# Patient Record
Sex: MALE | Race: WHITE | NOT HISPANIC OR LATINO | Employment: OTHER | URBAN - METROPOLITAN AREA
[De-identification: names, ages, dates, MRNs, and addresses within clinical notes are randomized per-mention and may not be internally consistent; named-entity substitution may affect disease eponyms.]

---

## 2017-07-25 LAB — HBA1C MFR BLD HPLC: 5.5 %

## 2017-07-31 ENCOUNTER — APPOINTMENT (EMERGENCY)
Dept: RADIOLOGY | Facility: HOSPITAL | Age: 67
End: 2017-07-31
Payer: MEDICARE

## 2017-07-31 ENCOUNTER — HOSPITAL ENCOUNTER (EMERGENCY)
Facility: HOSPITAL | Age: 67
Discharge: ANOTHER HOSPITAL | End: 2017-07-31
Attending: EMERGENCY MEDICINE | Admitting: EMERGENCY MEDICINE
Payer: MEDICARE

## 2017-07-31 VITALS
OXYGEN SATURATION: 95 % | TEMPERATURE: 98.1 F | BODY MASS INDEX: 28.44 KG/M2 | WEIGHT: 192 LBS | RESPIRATION RATE: 18 BRPM | SYSTOLIC BLOOD PRESSURE: 156 MMHG | HEIGHT: 69 IN | HEART RATE: 83 BPM | DIASTOLIC BLOOD PRESSURE: 97 MMHG

## 2017-07-31 DIAGNOSIS — S68.115A AMPUTATION OF LEFT RING FINGER: Primary | ICD-10-CM

## 2017-07-31 DIAGNOSIS — S61.219A LACERATION OF MULTIPLE SITES OF LEFT HAND AND FINGERS, INITIAL ENCOUNTER: ICD-10-CM

## 2017-07-31 DIAGNOSIS — S61.412A LACERATION OF MULTIPLE SITES OF LEFT HAND AND FINGERS, INITIAL ENCOUNTER: ICD-10-CM

## 2017-07-31 PROCEDURE — 73130 X-RAY EXAM OF HAND: CPT

## 2017-07-31 PROCEDURE — 96365 THER/PROPH/DIAG IV INF INIT: CPT

## 2017-07-31 PROCEDURE — 99284 EMERGENCY DEPT VISIT MOD MDM: CPT

## 2017-07-31 PROCEDURE — 96367 TX/PROPH/DG ADDL SEQ IV INF: CPT

## 2017-07-31 PROCEDURE — 90471 IMMUNIZATION ADMIN: CPT

## 2017-07-31 RX ORDER — ACETAMINOPHEN 325 MG/1
975 TABLET ORAL ONCE
Status: COMPLETED | OUTPATIENT
Start: 2017-07-31 | End: 2017-07-31

## 2017-07-31 RX ORDER — CIPROFLOXACIN 2 MG/ML
400 INJECTION, SOLUTION INTRAVENOUS ONCE
Status: DISCONTINUED | OUTPATIENT
Start: 2017-07-31 | End: 2017-07-31 | Stop reason: HOSPADM

## 2017-07-31 RX ADMIN — GENTAMICIN SULFATE 350 MG: 40 INJECTION, SOLUTION INTRAMUSCULAR; INTRAVENOUS at 16:26

## 2017-07-31 RX ADMIN — TETANUS TOXOID, REDUCED DIPHTHERIA TOXOID AND ACELLULAR PERTUSSIS VACCINE, ADSORBED 0.5 ML: 5; 2.5; 8; 8; 2.5 SUSPENSION INTRAMUSCULAR at 16:45

## 2017-07-31 RX ADMIN — CEFAZOLIN SODIUM 2000 MG: 2 SOLUTION INTRAVENOUS at 16:17

## 2017-07-31 RX ADMIN — ACETAMINOPHEN 975 MG: 325 TABLET ORAL at 15:51

## 2018-10-04 ENCOUNTER — OFFICE VISIT (OUTPATIENT)
Dept: FAMILY MEDICINE CLINIC | Facility: CLINIC | Age: 68
End: 2018-10-04
Payer: MEDICARE

## 2018-10-04 ENCOUNTER — APPOINTMENT (OUTPATIENT)
Dept: LAB | Facility: CLINIC | Age: 68
End: 2018-10-04
Payer: MEDICARE

## 2018-10-04 VITALS
WEIGHT: 187 LBS | RESPIRATION RATE: 18 BRPM | DIASTOLIC BLOOD PRESSURE: 88 MMHG | BODY MASS INDEX: 27.7 KG/M2 | HEIGHT: 69 IN | OXYGEN SATURATION: 97 % | HEART RATE: 59 BPM | SYSTOLIC BLOOD PRESSURE: 138 MMHG

## 2018-10-04 DIAGNOSIS — Z12.5 PROSTATE CANCER SCREENING: ICD-10-CM

## 2018-10-04 DIAGNOSIS — E78.2 HYPERLIPIDEMIA, MIXED: ICD-10-CM

## 2018-10-04 DIAGNOSIS — Z76.89 ENCOUNTER TO ESTABLISH CARE: Primary | ICD-10-CM

## 2018-10-04 DIAGNOSIS — R97.20 ELEVATED PSA: Primary | ICD-10-CM

## 2018-10-04 DIAGNOSIS — F42.8 OTHER OBSESSIVE-COMPULSIVE DISORDERS: ICD-10-CM

## 2018-10-04 DIAGNOSIS — I10 ESSENTIAL HYPERTENSION: ICD-10-CM

## 2018-10-04 DIAGNOSIS — Z76.89 ENCOUNTER TO ESTABLISH CARE: ICD-10-CM

## 2018-10-04 LAB
ALBUMIN SERPL BCP-MCNC: 3.7 G/DL (ref 3.5–5)
ALP SERPL-CCNC: 74 U/L (ref 46–116)
ALT SERPL W P-5'-P-CCNC: 24 U/L (ref 12–78)
ANION GAP SERPL CALCULATED.3IONS-SCNC: 5 MMOL/L (ref 4–13)
AST SERPL W P-5'-P-CCNC: 15 U/L (ref 5–45)
BILIRUB SERPL-MCNC: 0.52 MG/DL (ref 0.2–1)
BUN SERPL-MCNC: 15 MG/DL (ref 5–25)
CALCIUM SERPL-MCNC: 8.9 MG/DL (ref 8.3–10.1)
CHLORIDE SERPL-SCNC: 104 MMOL/L (ref 100–108)
CHOLEST SERPL-MCNC: 155 MG/DL (ref 50–200)
CO2 SERPL-SCNC: 28 MMOL/L (ref 21–32)
CREAT SERPL-MCNC: 1.19 MG/DL (ref 0.6–1.3)
GFR SERPL CREATININE-BSD FRML MDRD: 63 ML/MIN/1.73SQ M
GLUCOSE P FAST SERPL-MCNC: 81 MG/DL (ref 65–99)
HDLC SERPL-MCNC: 37 MG/DL (ref 40–60)
LDLC SERPL CALC-MCNC: 85 MG/DL (ref 0–100)
NONHDLC SERPL-MCNC: 118 MG/DL
POTASSIUM SERPL-SCNC: 4.1 MMOL/L (ref 3.5–5.3)
PROT SERPL-MCNC: 7.4 G/DL (ref 6.4–8.2)
PSA SERPL-MCNC: 4.8 NG/ML (ref 0–4)
SODIUM SERPL-SCNC: 137 MMOL/L (ref 136–145)
TRIGL SERPL-MCNC: 167 MG/DL

## 2018-10-04 PROCEDURE — G0103 PSA SCREENING: HCPCS

## 2018-10-04 PROCEDURE — 99204 OFFICE O/P NEW MOD 45 MIN: CPT | Performed by: NURSE PRACTITIONER

## 2018-10-04 PROCEDURE — 80053 COMPREHEN METABOLIC PANEL: CPT

## 2018-10-04 PROCEDURE — 36415 COLL VENOUS BLD VENIPUNCTURE: CPT

## 2018-10-04 PROCEDURE — 80061 LIPID PANEL: CPT

## 2018-10-04 RX ORDER — FLUOXETINE HYDROCHLORIDE 20 MG/1
20 CAPSULE ORAL 2 TIMES DAILY
Qty: 180 CAPSULE | Refills: 0 | OUTPATIENT
Start: 2018-10-04 | End: 2019-01-02

## 2018-10-04 RX ORDER — ATORVASTATIN CALCIUM 10 MG/1
10 TABLET, FILM COATED ORAL DAILY
Qty: 90 TABLET | Refills: 3 | Status: SHIPPED | OUTPATIENT
Start: 2018-10-04 | End: 2019-09-18

## 2018-10-04 RX ORDER — ATORVASTATIN CALCIUM 10 MG/1
10 TABLET, FILM COATED ORAL
COMMUNITY
Start: 2018-02-26 | End: 2018-10-04 | Stop reason: SDUPTHER

## 2018-10-04 RX ORDER — FLUOXETINE HYDROCHLORIDE 20 MG/1
20 CAPSULE ORAL
COMMUNITY
Start: 2018-08-07 | End: 2018-10-04 | Stop reason: SDUPTHER

## 2018-10-04 RX ORDER — FLUOXETINE HYDROCHLORIDE 20 MG/1
20 CAPSULE ORAL DAILY
Qty: 90 CAPSULE | Refills: 3 | Status: SHIPPED | OUTPATIENT
Start: 2018-10-04 | End: 2018-10-04 | Stop reason: SDUPTHER

## 2018-10-04 RX ORDER — LISINOPRIL 10 MG/1
10 TABLET ORAL DAILY
Qty: 90 TABLET | Refills: 3 | Status: SHIPPED | OUTPATIENT
Start: 2018-10-04 | End: 2019-04-11 | Stop reason: SDUPTHER

## 2018-10-04 RX ORDER — LISINOPRIL 10 MG/1
10 TABLET ORAL
COMMUNITY
Start: 2018-02-26 | End: 2018-10-04 | Stop reason: SDUPTHER

## 2018-10-04 RX ORDER — FLUOXETINE HYDROCHLORIDE 20 MG/1
20 CAPSULE ORAL 2 TIMES DAILY
Qty: 180 CAPSULE | Refills: 3 | Status: SHIPPED | OUTPATIENT
Start: 2018-10-04 | End: 2019-09-04 | Stop reason: SDUPTHER

## 2018-10-04 NOTE — PATIENT INSTRUCTIONS
Hypertension- stable  Hyperlipidemia- strive for five servings of fruits and veggies daily  Continue daily exercise  OCD- refill medication  Declines Flu shot  Declines colon cancer screening  Obtain lab work  Our office will call you with results  Follow up in 6 months

## 2018-10-04 NOTE — PROGRESS NOTES
Assessment/Plan:    No problem-specific Assessment & Plan notes found for this encounter  Diagnoses and all orders for this visit:    Encounter to establish care  -     Comprehensive metabolic panel; Future  -     Lipid panel; Future  -     PSA, Total Screen; Future    Essential hypertension  -     Comprehensive metabolic panel; Future  -     Lipid panel; Future  -     PSA, Total Screen; Future  -     lisinopril (ZESTRIL) 10 mg tablet; Take 1 tablet (10 mg total) by mouth daily for 90 days    Hyperlipidemia, mixed  -     Comprehensive metabolic panel; Future  -     Lipid panel; Future  -     PSA, Total Screen; Future  -     atorvastatin (LIPITOR) 10 mg tablet; Take 1 tablet (10 mg total) by mouth daily for 90 days    Prostate cancer screening  -     Comprehensive metabolic panel; Future  -     Lipid panel; Future  -     PSA, Total Screen; Future    Other obsessive-compulsive disorders  -     FLUoxetine (PROzac) 20 mg capsule; Take 1 capsule (20 mg total) by mouth daily for 90 days          Subjective:      Patient ID: Alfredo Gitelman is a 78 y o  male  Pt is here to become Established  His last visit was 6 months ago to his PCP  Hypertension-stable  HLD- needs labs  OCD- stable with Prozac  Leaving on a trip to [place] , next week  Pt did have an accident in 2017 with a table saw and suffered loss of 4th finger and still has some pain and "tightness"   Declines colon cancer screening        The following portions of the patient's history were reviewed and updated as appropriate:   He  has a past medical history of Hyperlipidemia; Hypertension; and OCD (obsessive compulsive disorder)  He   Patient Active Problem List    Diagnosis Date Noted    Prostate cancer screening 10/04/2018    Essential hypertension 10/04/2018    Hyperlipidemia, mixed 10/04/2018     He  has a past surgical history that includes Knee surgery  His family history is not on file  He  reports that he has never smoked   He has never used smokeless tobacco  He reports that he does not drink alcohol or use drugs  Current Outpatient Prescriptions   Medication Sig Dispense Refill    atorvastatin (LIPITOR) 10 mg tablet Take 1 tablet (10 mg total) by mouth daily for 90 days 90 tablet 3    FLUoxetine (PROzac) 20 mg capsule Take 1 capsule (20 mg total) by mouth daily for 90 days 90 capsule 3    lisinopril (ZESTRIL) 10 mg tablet Take 1 tablet (10 mg total) by mouth daily for 90 days 90 tablet 3     No current facility-administered medications for this visit  No current outpatient prescriptions on file prior to visit  No current facility-administered medications on file prior to visit  He has No Known Allergies       Review of Systems   Constitutional: Negative for fatigue and fever  HENT: Negative for congestion  Eyes: Negative for visual disturbance  Respiratory: Negative for cough and shortness of breath  Cardiovascular: Negative for chest pain, palpitations and leg swelling  Gastrointestinal: Negative for abdominal distention and abdominal pain  Endocrine: Negative for cold intolerance, polydipsia and polyuria  Genitourinary: Negative for difficulty urinating  Musculoskeletal: Negative for back pain and joint swelling  Skin: Negative for color change and rash  Allergic/Immunologic: Negative for immunocompromised state  Neurological: Negative for dizziness and headaches  Hematological: Negative for adenopathy  Psychiatric/Behavioral: Negative for behavioral problems and sleep disturbance         + OCD   All other systems reviewed and are negative  Objective:      /88 (BP Location: Left arm, Patient Position: Sitting)   Pulse 59   Resp 18   Ht 5' 9" (1 753 m)   Wt 84 8 kg (187 lb)   SpO2 97%   BMI 27 62 kg/m²          Physical Exam   Constitutional: He is oriented to person, place, and time  He appears well-developed and well-nourished  No distress  HENT:   Head: Normocephalic and atraumatic  Mouth/Throat: Oropharynx is clear and moist    Eyes: Pupils are equal, round, and reactive to light  Conjunctivae are normal    Neck: Normal range of motion  Neck supple  No JVD present  No thyromegaly present  Cardiovascular: Normal rate, regular rhythm and normal heart sounds  Exam reveals no gallop and no friction rub  No murmur heard  Pulmonary/Chest: Effort normal and breath sounds normal  No respiratory distress  Abdominal: Soft  Bowel sounds are normal  He exhibits no distension  There is no tenderness  Musculoskeletal: Normal range of motion  He exhibits deformity  Missing 4th finger of left hand   Lymphadenopathy:     He has no cervical adenopathy  Neurological: He is alert and oriented to person, place, and time  Skin: Skin is warm and dry  He is not diaphoretic  Psychiatric: He has a normal mood and affect  His behavior is normal  Judgment and thought content normal    Nursing note and vitals reviewed

## 2018-10-22 ENCOUNTER — TELEPHONE (OUTPATIENT)
Dept: UROLOGY | Facility: AMBULATORY SURGERY CENTER | Age: 68
End: 2018-10-22

## 2018-10-22 NOTE — TELEPHONE ENCOUNTER
Reason for appointment/Complaint/Diagnosis :  PSA 4 8    Insurance: Medicare & [employer]     History of Cancer?    no                    If yes, what kind    Previous urologist?     no              Records requested/where? In [address] [place] [address] Rd     Outside testing/where? In [facility]     Location Preference for office visit? [facility] only please, patient will be away first week of November

## 2019-01-14 NOTE — PATIENT INSTRUCTIONS
Prostate Specific Antigen   WHAT YOU NEED TO KNOW:   What is a prostate specific antigen (PSA) test?  A PSA test is a blood test used to screen men for prostate cancer  A PSA test is also used to monitor how well prostate cancer treatment is working  What other test may be done with a PSA test?  A digital rectal exam is usually performed with a PSA test  Your healthcare provider will insert a gloved finger into your rectum to feel if your prostate is large, firm, or has lumps  Who needs a PSA test?  Some experts recommend a PSA test for men ages 48 to 79  They also recommend testing men with a high risk for prostate cancer at age 36 or 39  Risk factors may include being  or having a brother or father with prostate cancer  Other experts may not recommend PSA testing  Your healthcare provider can help you decide if you need a PSA test    What do the results of a PSA test mean? Most healthy men have a PSA level less than 4 ng/mL  If your PSA level is higher than 4 ng/mL you may need more tests  Examples include blood or urine tests, an ultrasound, MRI, CT scan, or a prostate biopsy  Ask your healthcare provider for more information on these tests  What else can cause a high PSA level? A high PSA level does not always mean you have prostate cancer  Certain conditions or procedures can increase PSA levels  Examples include:  · Older age    · Procedures such as a prostate biopsy or a cystoscopy    · An enlarged prostate    · Recent sexual activity    · A prostate infection    · Certain exercises that put pressure on the prostate such as bicycling    · Medicine such as testosterone  CARE AGREEMENT:   You have the right to help plan your care  Learn about your health condition and how it may be treated  Discuss treatment options with your caregivers to decide what care you want to receive  You always have the right to refuse treatment  The above information is an  only   It is not intended as medical advice for individual conditions or treatments  Talk to your doctor, nurse or pharmacist before following any medical regimen to see if it is safe and effective for you  © 2017 [employer] Information is for End User's use only and may not be sold, redistributed or otherwise used for commercial purposes  All illustrations and images included in CareNotes® are the copyrighted property of [employer]  or [employer]

## 2019-01-14 NOTE — PROGRESS NOTES
Problem List Items Addressed This Visit     Prostate cancer screening - Primary     I discussed with the patient the AUA guidelines recommendations for prostate cancer screening in men age 53-78  We also discussed the competing risks of age, medical comorbidities, and the risks of infection, bleeding, pain, damage to surrounding structures, risk of diagnosis prostate cancer, and risks of incontinence and erectile dysfunction upon diagnosis and treatment of prostate cancer  Plan:  The patient's prostate is 45 grams to 50 grams and smooth without nodules, PSA is 4 8, slightly elevated, no family history of prostate cancer  Will repeat PSA free and total, follow-up in 4 weeks         Elevated PSA     I discussed with the patient the discovery of the PSA molecule and its original use in determining the return of prostate cancer after definitive therapy  I described the normal function of the PSA molecule in the reproductive process and also discussed the detection of PSA in the blood  We discussed the controversial history of PSA screening for prostate cancer in the [place] as well as the risk of over detection and over treatment of prostate cancer by way of PSA screening  The patient understands that PSA blood testing is an imperfect way to screen for prostate cancer and that elevated PSA levels in the blood may also be caused by infection, inflammation, prostatic trauma or manipulation, urological procedures, or by benign prostatic enlargement  The role of the digital rectal examination in prostate cancer screening was also discussed and I discussed with him that there is large interobserver variability in the findings of digital rectal examination  We discussed the continued workup of elevated PSA or abnormal digital rectal examination in the form of the performance of a prostate biopsy    The preparation for, and steps of, an office-based transrectal ultrasound of prostate biopsy were described to the patient  Benefits of obtaining tissue for pathologic analysis were discussed with him and the risks of prostate biopsy were also discussed at length  These risks include but are not limited to infection, bleeding, pain, sepsis with need for admission to hospital, risk of change in sexual function, and risk of diagnosis with prostate cancer  Alternatives to prostate biopsy in the form of continued PSA and BEKA surveillance were also offered to him  All of his questions and concerns were answered and addressed with regard to that detailed above  Plan: RTC 4 weeks with PSA free and total prior, if remains elevated will proceed to biopsy versus PSA surveillance           Relevant Orders    PSA, total and free              Assessment and plan:       Please see problem oriented charting for the assessment plan of today's urological complaints    Cathy Sykes MD      Chief Complaint     Chief Complaint   Patient presents with    Elevated PSA         History of Present Illness     Gloria Fischer is a 80 y o  retired  referred in consultation by Kate La, Alpesh Michelle for elevated PSA  He does not know what his baseline PSA value was, his PSA as of October 2018 is 4 8  His family history is negative for prostate cancer  He does have a history of nephrolithiasis in 2011, no history or signs of recurrent stones since that time  AUA symptom score is 5 with a bother score of 1  Of note, around the time his PSA was checked he was having urinary frequency and worsened nocturia, these have since resolved  This raises the possibility of a spurious elevation of PSA  He has no other urologic complaints today  He is well educated has good health literacy and has done some reading on elevated PSA and is also talked to some of his friends regarding this chief complaint      On review of systems he denies dysuria, incontinence, hematuria, only some warning time hesitancy is present, some urgency is present, nocturia is present once per night, and stream quality is good  The following portions of the patient's history were reviewed and updated as appropriate: allergies, current medications, past family history, past medical history, past social history, past surgical history and problem list     Detailed Urologic History     - please refer to HPI    Review of Systems     Review of Systems   Constitutional: Negative  HENT: Negative  Eyes: Negative  Respiratory: Negative  Cardiovascular: Negative  Gastrointestinal: Negative  Endocrine: Negative  Genitourinary:        As per HPI   Musculoskeletal: Negative  Skin: Negative  Allergic/Immunologic: Negative  Neurological: Negative  Hematological: Negative  Psychiatric/Behavioral: Negative  Allergies     No Known Allergies    Physical Exam     Physical Exam   Constitutional: He is oriented to person, place, and time  He appears well-developed and well-nourished  No distress  HENT:   Head: Normocephalic and atraumatic  Right Ear: External ear normal    Left Ear: External ear normal    Nose: Nose normal    Eyes: Pupils are equal, round, and reactive to light  Conjunctivae and EOM are normal  Right eye exhibits no discharge  Left eye exhibits no discharge  No scleral icterus  Neck: Normal range of motion  Neck supple  Cardiovascular: Regular rhythm and intact distal pulses  Pulmonary/Chest: Effort normal  No stridor  No respiratory distress  He has no wheezes  Abdominal: Soft  He exhibits no distension and no mass  There is no tenderness  There is no rebound and no guarding  No hernia  Genitourinary:   Genitourinary Comments: Penis is normal, circumcised, testes are normal, there is a small epididymal cyst on the left, appears benign on exam, perineum is normal, prostate is 45-50 grams and smooth without nodules   Musculoskeletal: He exhibits no edema, tenderness or deformity  Neurological: He is alert and oriented to person, place, and time  No cranial nerve deficit  He exhibits normal muscle tone  Coordination normal    Skin: Skin is warm and dry  No rash noted  He is not diaphoretic  No erythema  No pallor  Psychiatric: He has a normal mood and affect  His behavior is normal  Judgment and thought content normal    Nursing note and vitals reviewed  Vital Signs  Vitals:    01/16/19 1404   BP: 132/84   BP Location: Left arm   Patient Position: Sitting   Cuff Size: Standard   Pulse: 71   Weight: 85 5 kg (188 lb 9 6 oz)   Height: 5' 9" (1 753 m)         Current Medications       Current Outpatient Prescriptions:     atorvastatin (LIPITOR) 10 mg tablet, Take 1 tablet (10 mg total) by mouth daily for 90 days, Disp: 90 tablet, Rfl: 3    FLUoxetine (PROzac) 20 mg capsule, Take 1 capsule (20 mg total) by mouth 2 (two) times a day for 90 days, Disp: 180 capsule, Rfl: 3    lisinopril (ZESTRIL) 10 mg tablet, Take 1 tablet (10 mg total) by mouth daily for 90 days, Disp: 90 tablet, Rfl: 3      Active Problems     Patient Active Problem List   Diagnosis    Prostate cancer screening    Essential hypertension    Hyperlipidemia, mixed    Elevated PSA         Past Medical History     Past Medical History:   Diagnosis Date    Hyperlipidemia     Hypertension     OCD (obsessive compulsive disorder)          Surgical History     Past Surgical History:   Procedure Laterality Date    KNEE SURGERY           Family History     History reviewed  No pertinent family history        Social History     Social History     History   Smoking Status    Never Smoker   Smokeless Tobacco    Never Used         Pertinent Lab Values     Lab Results   Component Value Date    CREATININE 1 19 10/04/2018       Lab Results   Component Value Date    PSA 4 8 (H) 10/04/2018             Pertinent Imaging      No imaging available for my review

## 2019-01-16 ENCOUNTER — OFFICE VISIT (OUTPATIENT)
Dept: UROLOGY | Facility: CLINIC | Age: 69
End: 2019-01-16
Payer: COMMERCIAL

## 2019-01-16 VITALS
SYSTOLIC BLOOD PRESSURE: 132 MMHG | DIASTOLIC BLOOD PRESSURE: 84 MMHG | WEIGHT: 188.6 LBS | HEART RATE: 71 BPM | BODY MASS INDEX: 27.93 KG/M2 | HEIGHT: 69 IN

## 2019-01-16 DIAGNOSIS — R97.20 ELEVATED PSA: ICD-10-CM

## 2019-01-16 DIAGNOSIS — Z12.5 PROSTATE CANCER SCREENING: Primary | ICD-10-CM

## 2019-01-16 PROCEDURE — 99204 OFFICE O/P NEW MOD 45 MIN: CPT | Performed by: UROLOGY

## 2019-01-16 NOTE — ASSESSMENT & PLAN NOTE
I discussed with the patient the discovery of the PSA molecule and its original use in determining the return of prostate cancer after definitive therapy  I described the normal function of the PSA molecule in the reproductive process and also discussed the detection of PSA in the blood  We discussed the controversial history of PSA screening for prostate cancer in the [place] as well as the risk of over detection and over treatment of prostate cancer by way of PSA screening  The patient understands that PSA blood testing is an imperfect way to screen for prostate cancer and that elevated PSA levels in the blood may also be caused by infection, inflammation, prostatic trauma or manipulation, urological procedures, or by benign prostatic enlargement  The role of the digital rectal examination in prostate cancer screening was also discussed and I discussed with him that there is large interobserver variability in the findings of digital rectal examination  We discussed the continued workup of elevated PSA or abnormal digital rectal examination in the form of the performance of a prostate biopsy  The preparation for, and steps of, an office-based transrectal ultrasound of prostate biopsy were described to the patient  Benefits of obtaining tissue for pathologic analysis were discussed with him and the risks of prostate biopsy were also discussed at length  These risks include but are not limited to infection, bleeding, pain, sepsis with need for admission to hospital, risk of change in sexual function, and risk of diagnosis with prostate cancer  Alternatives to prostate biopsy in the form of continued PSA and BEKA surveillance were also offered to him  All of his questions and concerns were answered and addressed with regard to that detailed above      Plan: RTC 4 weeks with PSA free and total prior, if remains elevated will proceed to biopsy versus PSA surveillance

## 2019-01-16 NOTE — LETTER
January 16, 2019     Angelique England, [address]  [facility]  [address]    Patient: Jyotsna Rehman   YOB: 1950   Date of Visit: 1/16/2019       Dear Dr Ra Mujica:    Thank you for referring Tino Sanches to me for evaluation  Below are my notes for this consultation  If you have questions, please do not hesitate to call me  I look forward to following your patient along with you  Sincerely,        Suzanne Chaudhari MD        CC: No Recipients  Suzanne Chaudhari MD  1/16/2019  2:27 PM  Sign at close encounter       Problem List Items Addressed This Visit     Prostate cancer screening - Primary     I discussed with the patient the AUA guidelines recommendations for prostate cancer screening in men age 53-78  We also discussed the competing risks of age, medical comorbidities, and the risks of infection, bleeding, pain, damage to surrounding structures, risk of diagnosis prostate cancer, and risks of incontinence and erectile dysfunction upon diagnosis and treatment of prostate cancer  Plan:  The patient's prostate is 45 grams to 50 grams and smooth without nodules, PSA is 4 8, slightly elevated, no family history of prostate cancer  Will repeat PSA free and total, follow-up in 4 weeks         Elevated PSA     I discussed with the patient the discovery of the PSA molecule and its original use in determining the return of prostate cancer after definitive therapy  I described the normal function of the PSA molecule in the reproductive process and also discussed the detection of PSA in the blood  We discussed the controversial history of PSA screening for prostate cancer in the [place] as well as the risk of over detection and over treatment of prostate cancer by way of PSA screening    The patient understands that PSA blood testing is an imperfect way to screen for prostate cancer and that elevated PSA levels in the blood may also be caused by infection, inflammation, prostatic trauma or manipulation, urological procedures, or by benign prostatic enlargement  The role of the digital rectal examination in prostate cancer screening was also discussed and I discussed with him that there is large interobserver variability in the findings of digital rectal examination  We discussed the continued workup of elevated PSA or abnormal digital rectal examination in the form of the performance of a prostate biopsy  The preparation for, and steps of, an office-based transrectal ultrasound of prostate biopsy were described to the patient  Benefits of obtaining tissue for pathologic analysis were discussed with him and the risks of prostate biopsy were also discussed at length  These risks include but are not limited to infection, bleeding, pain, sepsis with need for admission to hospital, risk of change in sexual function, and risk of diagnosis with prostate cancer  Alternatives to prostate biopsy in the form of continued PSA and BEKA surveillance were also offered to him  All of his questions and concerns were answered and addressed with regard to that detailed above  Plan: RTC 4 weeks with PSA free and total prior, if remains elevated will proceed to biopsy versus PSA surveillance           Relevant Orders    PSA, total and free              Assessment and plan:       Please see problem oriented charting for the assessment plan of today's urological complaints    Brayan Gallardo MD      Chief Complaint     Chief Complaint   Patient presents with    Elevated PSA         History of Present Illness     Zach Barraza is a 80 y o  retired  referred in consultation by Alpesh Ballard for elevated PSA  He does not know what his baseline PSA value was, his PSA as of October 2018 is 4 8  His family history is negative for prostate cancer  He does have a history of nephrolithiasis in 2011, no history or signs of recurrent stones since that time    DIANA symptom score is 5 with a bother score of 1  Of note, around the time his PSA was checked he was having urinary frequency and worsened nocturia, these have since resolved  This raises the possibility of a spurious elevation of PSA  He has no other urologic complaints today  He is well educated has good health literacy and has done some reading on elevated PSA and is also talked to some of his friends regarding this chief complaint  On review of systems he denies dysuria, incontinence, hematuria, only some warning time hesitancy is present, some urgency is present, nocturia is present once per night, and stream quality is good  The following portions of the patient's history were reviewed and updated as appropriate: allergies, current medications, past family history, past medical history, past social history, past surgical history and problem list     Detailed Urologic History     - please refer to HPI    Review of Systems     Review of Systems   Constitutional: Negative  HENT: Negative  Eyes: Negative  Respiratory: Negative  Cardiovascular: Negative  Gastrointestinal: Negative  Endocrine: Negative  Genitourinary:        As per HPI   Musculoskeletal: Negative  Skin: Negative  Allergic/Immunologic: Negative  Neurological: Negative  Hematological: Negative  Psychiatric/Behavioral: Negative  Allergies     No Known Allergies    Physical Exam     Physical Exam   Constitutional: He is oriented to person, place, and time  He appears well-developed and well-nourished  No distress  HENT:   Head: Normocephalic and atraumatic  Right Ear: External ear normal    Left Ear: External ear normal    Nose: Nose normal    Eyes: Pupils are equal, round, and reactive to light  Conjunctivae and EOM are normal  Right eye exhibits no discharge  Left eye exhibits no discharge  No scleral icterus  Neck: Normal range of motion  Neck supple     Cardiovascular: Regular rhythm and intact distal pulses  Pulmonary/Chest: Effort normal  No stridor  No respiratory distress  He has no wheezes  Abdominal: Soft  He exhibits no distension and no mass  There is no tenderness  There is no rebound and no guarding  No hernia  Genitourinary:   Genitourinary Comments: Penis is normal, circumcised, testes are normal, there is a small epididymal cyst on the left, appears benign on exam, perineum is normal, prostate is 45-50 grams and smooth without nodules   Musculoskeletal: He exhibits no edema, tenderness or deformity  Neurological: He is alert and oriented to person, place, and time  No cranial nerve deficit  He exhibits normal muscle tone  Coordination normal    Skin: Skin is warm and dry  No rash noted  He is not diaphoretic  No erythema  No pallor  Psychiatric: He has a normal mood and affect  His behavior is normal  Judgment and thought content normal    Nursing note and vitals reviewed            Vital Signs  Vitals:    01/16/19 1404   BP: 132/84   BP Location: Left arm   Patient Position: Sitting   Cuff Size: Standard   Pulse: 71   Weight: 85 5 kg (188 lb 9 6 oz)   Height: 5' 9" (1 753 m)         Current Medications       Current Outpatient Prescriptions:     atorvastatin (LIPITOR) 10 mg tablet, Take 1 tablet (10 mg total) by mouth daily for 90 days, Disp: 90 tablet, Rfl: 3    FLUoxetine (PROzac) 20 mg capsule, Take 1 capsule (20 mg total) by mouth 2 (two) times a day for 90 days, Disp: 180 capsule, Rfl: 3    lisinopril (ZESTRIL) 10 mg tablet, Take 1 tablet (10 mg total) by mouth daily for 90 days, Disp: 90 tablet, Rfl: 3      Active Problems     Patient Active Problem List   Diagnosis    Prostate cancer screening    Essential hypertension    Hyperlipidemia, mixed    Elevated PSA         Past Medical History     Past Medical History:   Diagnosis Date    Hyperlipidemia     Hypertension     OCD (obsessive compulsive disorder)          Surgical History     Past Surgical History: Procedure Laterality Date    KNEE SURGERY           Family History     History reviewed  No pertinent family history        Social History     Social History     History   Smoking Status    Never Smoker   Smokeless Tobacco    Never Used         Pertinent Lab Values     Lab Results   Component Value Date    CREATININE 1 19 10/04/2018       Lab Results   Component Value Date    PSA 4 8 (H) 10/04/2018             Pertinent Imaging      No imaging available for my review

## 2019-01-16 NOTE — ASSESSMENT & PLAN NOTE
I discussed with the patient the AUA guidelines recommendations for prostate cancer screening in men age 53-78  We also discussed the competing risks of age, medical comorbidities, and the risks of infection, bleeding, pain, damage to surrounding structures, risk of diagnosis prostate cancer, and risks of incontinence and erectile dysfunction upon diagnosis and treatment of prostate cancer  Plan:  The patient's prostate is 45 grams to 50 grams and smooth without nodules, PSA is 4 8, slightly elevated, no family history of prostate cancer   Will repeat PSA free and total, follow-up in 4 weeks

## 2019-01-20 ENCOUNTER — TELEPHONE (OUTPATIENT)
Dept: UROLOGY | Facility: CLINIC | Age: 69
End: 2019-01-20

## 2019-01-20 DIAGNOSIS — R97.20 ELEVATED PSA: Primary | ICD-10-CM

## 2019-01-20 RX ORDER — CIPROFLOXACIN 500 MG/1
500 TABLET, FILM COATED ORAL EVERY 12 HOURS SCHEDULED
Qty: 6 TABLET | Refills: 0 | Status: SHIPPED | OUTPATIENT
Start: 2019-01-20 | End: 2019-01-23

## 2019-01-20 RX ORDER — MAGNESIUM CARB/ALUMINUM HYDROX 105-160MG
296 TABLET,CHEWABLE ORAL ONCE
Qty: 1 BOTTLE | Refills: 0 | Status: SHIPPED | OUTPATIENT
Start: 2019-01-20 | End: 2019-02-07

## 2019-01-20 NOTE — TELEPHONE ENCOUNTER
PSA free and total reviewed, PSA has increased, low percentage of free PSA, higher risk for prostate cancer    He will require prostate biopsy

## 2019-01-21 NOTE — TELEPHONE ENCOUNTER
Called and spoke to patient  Reviewed with patient that per Dr Paul PSA level has increased and that he is recommending a prostate biopsy  Reviewed with patient prostate biopsy prep including, discontinuing blood thinners, antibiotic, mag citrate, and enema  Also reviewed with patient prostate biopsy itself  Also made patient aware that he we will him a copy of the prostate biopsy preparation sheet  Confirmed patients address  Also confirmed patients pharmacy and made him aware that medications were already sent over  Patient aware that him appointment will need to be rescheduled into a prostate biopsy appointment and that he will also need a 2 week discussion appointment  Please call patient and schedule prostate biopsy and 2 week discussion appointment after biopsy

## 2019-01-21 NOTE — TELEPHONE ENCOUNTER
Spoke with patient and scheduled BX for this Friday 1/25/19 at 10am  Scheduled biopsy results for 2/7/19 at 11am

## 2019-01-22 ENCOUNTER — TELEPHONE (OUTPATIENT)
Dept: UROLOGY | Facility: CLINIC | Age: 69
End: 2019-01-22

## 2019-01-22 LAB
PSA FREE MFR SERPL: 5.9 %
PSA FREE SERPL-MCNC: 0.4 NG/ML
PSA SERPL-MCNC: 6.8 NG/ML (ref 0–4)

## 2019-01-23 NOTE — PROGRESS NOTES
Office TRUS-guided Prostate Biopsy Procedure Note    Indication    Elevated PSA, increasing PSA    Informed consent   The risks, benefits and alternatives to TRUS-guided prostate biopsy were conveyed to the patient prior to performing the procedure  A discussion of the risks of the procedure included, but was not limited to: pain, hematuria, hematochezia, hematospermia, infection, and the possibility of a non-diagnostic biopsy  The patient was given the opportunity to have his questions answered and there was no perceived barrier to education  Antibiotic prophylaxis   The patient received the following antibiotics at least 30 minutes prior to undergoing biopsy: Ciprofloxacin 500 mg PO  The patient was instructed to continue taking the antibiotics as prescribed for a total of 3 days, including the day of biopsy  Rectal cleansing  The patient was instructed to perform an evacuating rectal enema 1-2 hours prior to biopsy  Local anesthesia  Topical 2% lidocaine jelly was applied liberally to the anus and rectum and allowed to dwell for at least 5 min prior to starting the procedure  After insertion of the TRUS probe, 10 mL of 2% lidocaine solution was injected with ultrasound guidance at the  junction of the prostate and seminal vesicles  The anesthetic was allowed to dwell for at least 2 minutes prior to biopsy  Transrectal ultrasonography  The patient was placed in the left lateral decubitus position  After an attentive digital rectal examination, a 7 5 mHz sidefire ultrasound probe was gently inserted into the rectum and biplanar imaging of the prostate was done with the findings noted below  Images were taken of any abnormal findings and also to document prostate size      Bladder  The bladder base appeared normal     Prostate  Digital rectal exam findings:  - approximately 30 gram gland, normal consistency    Ultrasound size measurements:  -Volume:  22 14 cm3    Ultrasound findings:  -Cysts: None  -Masses: None  -Median lobe: absent    Clinical stage (assuming a positive biopsy):   -T1c     TRUS-guided needle biopsy  Using an 18 gauge biopsy needle and ultrasound guidance, the following biopsies were taken:    1 core(s) from the left lateral base  1 core(s) from the left lateral mid-gland  1 core(s) from the right middle base  1 core(s) from the right lateral base  1 core(s) from the left lateral mid-gland  1 core(s) from the left middle mid-gland  1 core(s) from the right middle mid-gland  1 core(s) from the right lateral mid-gland  1 core(s) from the left lateral apex  1 core(s) from the left middle apex  1 core(s) from the right middle apex  1 core(s) from the right lateral apex    Total number of cores: 12                Complications  There were no procedural complications  Disposition  The patient was dismissed to home after 30 minutes of observation in stable condition  Post-procedure instructions: Today he underwent an uncomplicated transrectal ultrasound-guided biopsy of the prostate, following a periprosthetic nerve block  I reviewed the normal postprocedure a course including bleeding per recutm, hematuria, and hematospermia  I instructed him to complete his course of antibiotics as prescribed  Instructed him to call with fever greater than 101, chills, nausea, vomiting, poorly controlled pain  His followup was scheduled in approximately 2 weeks' time to review the pathology  Biopsy prostate     Date/Time 1/25/2019 10:23 AM     Performed by  Arthur Rogers     Authorized by Arthur Rogers       Consent: Verbal consent obtained  Written consent obtained    Risks and benefits: risks, benefits and alternatives were discussed  Consent given by: patient  Patient understanding: patient states understanding of the procedure being performed  Patient consent: the patient's understanding of the procedure matches consent given  Procedure consent: procedure consent matches procedure scheduled  Relevant documents: relevant documents present and verified  Test results: test results available and properly labeled  Site marked: the operative site was not marked  Imaging studies: imaging studies available  Required items: required blood products, implants, devices, and special equipment available  Patient identity confirmed: verbally with patient and provided demographic data      Preparation: Patient was prepped and draped in the usual sterile fashion  Local anesthesia used: yes     Anesthesia   Local anesthesia used: yes  Local Anesthetic: lidocaine 2% without epinephrine     Sedation   Patient sedated: no      Specimen: yes    Culture: no   Procedure Details   Procedure Notes: Uncomplicated 12 core biopsy, taking 6 cores from each half of the prostate    Patient will follow in 2 weeks for results

## 2019-01-23 NOTE — PATIENT INSTRUCTIONS
Prostate Biopsy   WHAT YOU NEED TO KNOW:   A prostate biopsy is a procedure to remove samples of tissue from your prostate gland  The prostate is a male sex gland that makes fluid found in semen  It is located just below the bladder  After the samples are removed, they are sent to a lab and tested for cancer  DISCHARGE INSTRUCTIONS:   Seek care immediately if:   · You have heavy bleeding from your rectum  · You urinate very little or not at all  · You have pain from your procedure that gets worse, even after you take pain medicine  Contact your healthcare provider if:   · You have a fever or chills  · You feel pain or burning when you urinate  · Your urine is cloudy or smells bad  · You have questions or concerns about your condition or care  Medicines:  · Medicines  can help decrease pain  You may need medicine to prevent or treat a bacterial infection  Ask how to take pain medicine safely  · Take your medicine as directed  Contact your healthcare provider if you think your medicine is not helping or if you have side effects  Tell him or her if you are allergic to any medicine  Keep a list of the medicines, vitamins, and herbs you take  Include the amounts, and when and why you take them  Bring the list or the pill bottles to follow-up visits  Carry your medicine list with you in case of an emergency  Follow up with your healthcare provider or urologist as directed: You may need to return for more tests or procedures  Write down your questions so you remember to ask them during your visits  © 2017 [employer] Information is for End User's use only and may not be sold, redistributed or otherwise used for commercial purposes  All illustrations and images included in CareNotes® are the copyrighted property of [employer]  or [employer]  The above information is an  only   It is not intended as medical advice for individual conditions or treatments  Talk to your doctor, nurse or pharmacist before following any medical regimen to see if it is safe and effective for you

## 2019-01-25 ENCOUNTER — PROCEDURE VISIT (OUTPATIENT)
Dept: UROLOGY | Facility: CLINIC | Age: 69
End: 2019-01-25
Payer: COMMERCIAL

## 2019-01-25 VITALS
DIASTOLIC BLOOD PRESSURE: 92 MMHG | SYSTOLIC BLOOD PRESSURE: 118 MMHG | BODY MASS INDEX: 27.76 KG/M2 | HEART RATE: 74 BPM | HEIGHT: 69 IN | WEIGHT: 187.4 LBS

## 2019-01-25 DIAGNOSIS — R97.20 ELEVATED PSA: Primary | ICD-10-CM

## 2019-01-25 PROCEDURE — G0416 PROSTATE BIOPSY, ANY MTHD: HCPCS | Performed by: PATHOLOGY

## 2019-01-25 PROCEDURE — 76942 ECHO GUIDE FOR BIOPSY: CPT | Performed by: UROLOGY

## 2019-01-25 PROCEDURE — 88344 IMHCHEM/IMCYTCHM EA MLT ANTB: CPT | Performed by: PATHOLOGY

## 2019-01-25 PROCEDURE — 55700 PR BIOPSY OF PROSTATE,NEEDLE/PUNCH: CPT | Performed by: UROLOGY

## 2019-01-25 NOTE — LETTER
January 25, 2019     Severa Belts, [facility] [address] [facility]    Patient: Demetrius Franklin   YOB: 1950   Date of Visit: 1/25/2019       Dear Dr Sebastián Brady:    Thank you for referring Master Arvizu to me for evaluation  Below are my notes for this consultation  If you have questions, please do not hesitate to call me  I look forward to following your patient along with you  Sincerely,        Jama Ernandez MD        CC: No Recipients  Jama Ernandez MD  1/25/2019 10:23 AM  Sign at close encounter  Office TRUS-guided Prostate Biopsy Procedure Note    Indication    Elevated PSA, increasing PSA    Informed consent   The risks, benefits and alternatives to TRUS-guided prostate biopsy were conveyed to the patient prior to performing the procedure  A discussion of the risks of the procedure included, but was not limited to: pain, hematuria, hematochezia, hematospermia, infection, and the possibility of a non-diagnostic biopsy  The patient was given the opportunity to have his questions answered and there was no perceived barrier to education  Antibiotic prophylaxis   The patient received the following antibiotics at least 30 minutes prior to undergoing biopsy: Ciprofloxacin 500 mg PO  The patient was instructed to continue taking the antibiotics as prescribed for a total of 3 days, including the day of biopsy  Rectal cleansing  The patient was instructed to perform an evacuating rectal enema 1-2 hours prior to biopsy  Local anesthesia  Topical 2% lidocaine jelly was applied liberally to the anus and rectum and allowed to dwell for at least 5 min prior to starting the procedure  After insertion of the TRUS probe, 10 mL of 2% lidocaine solution was injected with ultrasound guidance at the  junction of the prostate and seminal vesicles  The anesthetic was allowed to dwell for at least 2 minutes prior to biopsy      Transrectal ultrasonography  The patient was placed in the left lateral decubitus position  After an attentive digital rectal examination, a 7 5 mHz sidefire ultrasound probe was gently inserted into the rectum and biplanar imaging of the prostate was done with the findings noted below  Images were taken of any abnormal findings and also to document prostate size  Bladder  The bladder base appeared normal     Prostate  Digital rectal exam findings:  - approximately 30 gram gland, normal consistency    Ultrasound size measurements:  -Volume:  22 14 cm3    Ultrasound findings:  -Cysts: None  -Masses: None  -Median lobe: absent    Clinical stage (assuming a positive biopsy):   -T1c     TRUS-guided needle biopsy  Using an 18 gauge biopsy needle and ultrasound guidance, the following biopsies were taken:    1 core(s) from the left lateral base  1 core(s) from the left lateral mid-gland  1 core(s) from the right middle base  1 core(s) from the right lateral base  1 core(s) from the left lateral mid-gland  1 core(s) from the left middle mid-gland  1 core(s) from the right middle mid-gland  1 core(s) from the right lateral mid-gland  1 core(s) from the left lateral apex  1 core(s) from the left middle apex  1 core(s) from the right middle apex  1 core(s) from the right lateral apex    Total number of cores: 12                Complications  There were no procedural complications  Disposition  The patient was dismissed to home after 30 minutes of observation in stable condition  Post-procedure instructions: Today he underwent an uncomplicated transrectal ultrasound-guided biopsy of the prostate, following a periprosthetic nerve block  I reviewed the normal postprocedure a course including bleeding per recutm, hematuria, and hematospermia  I instructed him to complete his course of antibiotics as prescribed  Instructed him to call with fever greater than 101, chills, nausea, vomiting, poorly controlled pain   His followup was scheduled in approximately 2 weeks' time to review the pathology  Biopsy prostate     Date/Time 1/25/2019 10:23 AM     Performed by  Jon Ghosh     Authorized by Jon Bowels       Consent: Verbal consent obtained  Written consent obtained  Risks and benefits: risks, benefits and alternatives were discussed  Consent given by: patient  Patient understanding: patient states understanding of the procedure being performed  Patient consent: the patient's understanding of the procedure matches consent given  Procedure consent: procedure consent matches procedure scheduled  Relevant documents: relevant documents present and verified  Test results: test results available and properly labeled  Site marked: the operative site was not marked  Imaging studies: imaging studies available  Required items: required blood products, implants, devices, and special equipment available  Patient identity confirmed: verbally with patient and provided demographic data      Preparation: Patient was prepped and draped in the usual sterile fashion  Local anesthesia used: yes     Anesthesia   Local anesthesia used: yes  Local Anesthetic: lidocaine 2% without epinephrine     Sedation   Patient sedated: no      Specimen: yes    Culture: no   Procedure Details   Procedure Notes: Uncomplicated 12 core biopsy, taking 6 cores from each half of the prostate    Patient will follow in 2 weeks for results

## 2019-02-05 NOTE — PATIENT INSTRUCTIONS
Decision Aid for Clinically Localized Prostate Cancer   WHAT YOU NEED TO KNOW:   What do I need to know about decisions for clinically localized prostate cancer? You can work with your healthcare provider to make decisions about being screened or treated for prostate cancer  Screening is a test done to find prostate cancer early  Screening is different from diagnosis because screening is used before you first start to have signs or symptoms  This means management or treatment can start early  You can also help plan treatment if cancer is found with screening, or you develop it later on  What do I need to know about clinically localized prostate cancer? · The prostate is a small gland that is part of the reproductive system  It sits around your urethra (tube that carries urine out of your bladder)  Cancer cells start to grow inside the prostate gland  The cells form a mass that continues to grow if left untreated  Clinically localized means that the cancer has not moved beyond the prostate gland  · Prostate cancer is the second most common form of cancer in men (skin cancer is most common)  About 17% of men in the [place] develop prostate cancer  About 3% of men in the [place] die from prostate cancer  · Prostate cancer often grows slowly  Sometimes the tumor does not grow at all  The cancer may not grow quickly enough to be life-threatening in your lifetime  · The risk for prostate cancer increases with age  Your risk is highest if you are older than 65 years  Your risk is lowest if you are younger than 45 years  Your risk is also increased if you have a history of prostate cancer in your father, brother, or son  · You may have no signs or symptoms of prostate cancer until the tumor grows large  You may have trouble urinating or keeping a strong urine stream because of the tumor  At later stages, prostate cancer can spread to your bones or lymph nodes  You may have bone pain or fractures    Am I a good candidate for prostate cancer screening? Screening may be helpful for you if any of the following is true:  · You are 72 years or older  · You have a family history of prostate cancer or other prostate problems  · You do not have another health condition that may prevent you from living longer than another 10 years  · You want to have treatment as early as possible if needed  · You are willing to have screening as often as recommended by your healthcare provider  How is screening done? · A digital rectal exam  is used to check your prostate  Your healthcare provider will insert a gloved finger into your rectum  The provider will be able to feel your prostate for lumps or hard areas that could be tumors  The exam may be repeated over time to check for new or worsening problems  · A PSA test  is used to measure the amount of a protein made by your prostate gland  A blood sample is taken for this test  A high PSA level may be a sign of prostate cancer  What are the benefits and risks of screening? Talk with your healthcare provider about the risks and benefits of screening:  · Benefits  include finding prostate cancer early  Cancer treatment is often more successful when it starts early  This means you can make more decisions about treatment  · Risks  include the following:     ¨ You may have a false belief that you will not develop prostate cancer if your screening result is negative  You can still develop prostate cancer later on  ¨ A PSA test can give a false-negative result  This means the result looks like you do not have cancer even though you do  Treatment or monitoring may be delayed because the tests suggested you do not have cancer  ¨ The PSA test can also give a false-positive result  This means you do not actually have cancer but the test shows you do  You may get more tests, a biopsy, or even treatments that are not needed   It can also be stressful to think you have prostate cancer when you do not  What questions should I ask my healthcare provider to help me make decisions about screening? · How high is my risk for prostate cancer? · How often do I need to have screening? · Where is the screening done? · Do I need to do anything to get ready to have screening? What happens after I have screening? · You will meet with your healthcare provider to go over the results of your screening  · You may need more tests to diagnose anything that showed up on the screening test  Only a biopsy (tissue sample) can show for sure that you have prostate cancer  · After cancer is found, your healthcare provider will assign a number called a Lou score  The number can help you understand how quickly the cancer is likely to grow, and if it may spread:     ¨ A number of 6 or lower means the cancer is likely to grow more slowly  ¨ A score of 7 means it is likely to grow faster, but it may not spread to other areas  ¨ A score of 8 to 10 means it is likely to grow more quickly and also spread  · Your healthcare provider will also assign a T stage to the tumor  This number shows the growth of the tumor and if it is likely to spread to other areas  · You and your healthcare provider will use the Lou score, T stage, and PSA results to talk about your treatment options  Your provider will tell you if your prostate cancer is at low, medium, or high risk for growing and spreading  The need for more tests and the range of treatment options depend on the risk level  Together you and your provider can create a treatment plan that is right for you  How is clinically localized prostate cancer treated, and what are the benefits of treatment? · Watchful waiting  means you do not receive treatment right away  If the cancer does not grow or spread, you may never need treatment  Watchful waiting may be used if your tumor risk is low   The benefit of this option is that you will not have invasive or difficult treatment  You can choose a different treatment option if tests show the tumor is growing or spreading over time  · Active surveillance  also means you do not receive treatment right away  You will need to have tests over time to continue to check the tumor  A benefit of this option is that follow-up tests can show a change early  Treatment options may be less invasive than if the tumor is found at a later stage  · Cryoablation  is used to kill cancer cells by freezing them  This is a less invasive treatment  You may have little or no pain after this procedure  · Radiation  may be used to destroy the cancer cells  Radiation is about as effective as surgery to remove the prostate gland  This treatment leaves the prostate in place and only targets the cancer cells  · Surgery  is used to remove the prostate gland  The tumor is in the gland, so it will be removed along with the gland  · Hormone therapy  may be added to surgery or radiation treatment  Your testosterone level is lowered, or it is blocked  This can stop the cancer cells from growing, or slow the growth  Hormone therapy may be given as an injection every 1 to 6 months  Another way to lower your testosterone level is to have surgery to remove your testicles  Your body will no longer make testosterone if your testicles are removed  What are the risks of treatment? · Watchful waiting and active surveillance  can cause you to worry that the cancer is growing or spreading  · Surgery  can damage tissue around your prostate  Surgery can increase your risk for trouble urinating, incontinence (leaking), or urinary retention  Surgery can also cause problems with your ability to have or keep an erection  You may bleed more than expected during surgery or develop an infection  You may also need to be treated again if the surgery you have does not relieve your symptoms   Surgery may not be able to remove all the tumor cells     · Radiation  may not kill all the cancer cells  Radiation can increase your risk for trouble urinating, incontinence (leaking), or urinary retention  You may have temporary or permanent hair loss in your scrotum  Your skin can become dry or irritated  You may develop problems urinating or having a bowel movement  Radiation can also cause problems with your ability to have or keep an erection  · Cryoablation  may not kill all the cancer cells  You may develop scar tissue  You can also have swelling, problems urinating, or pain in your pelvis or scrotum  Tissue outside the prostate may be damaged during cryoablation  You may have permanent erection problems  What questions should I ask my healthcare provider to help me make decisions about treatment? · What is my Newport score, T score, and risk number? · How often will I need follow-up tests if I choose active surveillance first?    · How will each treatment option affect my daily activities? · What is the risk for erectile dysfunction or impotence with each treatment? · Am I a good candidate for surgery? · Which surgery may work best to treat my symptoms? · Where is the surgery done? · How long is recovery after surgery? · Will my insurance cover treatment? What do I need to think about to help me make decisions about treatment? · If my cancer risk is low, will I be comfortable with watchful waiting or active surveillance instead of immediate treatment? How will I feel if the cancer grows or spreads? · Will I go in for follow-up tests over time if I do not want treatment right away? · If I have treatment and lose my ability to have an erection, how will I feel? · Am I willing to accept problems with urinating or having a bowel movement that might happen with treatment? · How will my family or others in my life be affected by my treatment decisions? CARE AGREEMENT:   You have the right to help plan your care  Learn about your health condition and how it may be treated  Discuss treatment options with your caregivers to decide what care you want to receive  You always have the right to refuse treatment  The above information is an  only  It is not intended as medical advice for individual conditions or treatments  Talk to your doctor, nurse or pharmacist before following any medical regimen to see if it is safe and effective for you  © 2017 [employer] Information is for End User's use only and may not be sold, redistributed or otherwise used for commercial purposes  All illustrations and images included in CareNotes® are the copyrighted property of [employer]  or [employer]

## 2019-02-05 NOTE — PROGRESS NOTES
Problem List Items Addressed This Visit     Prostate cancer screening     Elevated PSA, biopsy reveals intermediate risk prostate cancer         Elevated PSA     Found to be due to prostate cancer         Prostate cancer ([facility] [address] ) - Primary     I had a discussion with the patient regarding the natural history and treatment options for prostate cancer and the potential need for multimodal treatments  Active surveillance, surgical excision in the form of open or robotic surgery, and radiation therapies plus or minus androgen deprivation therapy were discussed at length with the patient  With regard to multimodal therapy discussion this could include surgical excision with postoperative radiation therapy (RT) +/-androgen deprivation therapy (ADT) if adverse pathology or RT with long-term ADT  I discussed robot assisted laparoscopic radical prostatectomy and bilateral pelvic lymph node dissection in depth with the patient  [employer] SI and Xi surgical platforms were reviewed with the patient  I discussed that surgery has several potential advantages compared with radiation including the more accurate prognostic, pathologic information afforded by the surgical pathology, the more immediate indication of effective treatment as indicated by an undetectable PSA, as well as the relatively easier use of adjuvant or salvage radiotherapy postoperatively if the need arises  I discussed that in comparison with open surgery that robotic prostatectomy has the benefits of improved convalescence and decreased blood loss and appears to have equivalent cancer control rates and quality-of-life side effects such similar rates of urinary incontinence and erectile dysfunction  I discussed that most men stay in the hospital for 1-2 days and go home with a catheter for 7-10 days   Postoperatively, I discussed an aggressive penile rehabilitation approach with regular use of Viagra 50 mg every other day as well as possible use of a vacuum device to improve arterial oxygenation, which may improve nerve recovery  I discussed the side effects of surgery including stress incontinence, and I counseled him that most men leak urine immediately after surgery  I expect a stress incontinence rate at six months in the 10%-15% range, improving to 5%-10% in one year  I did communicate that there additional treatments for urinary incontinence that may be persistent in the post-prostatectomy setting  In terms of erectile dysfunction, we discussed the normal physiology of the erectile response as well as discussing the anatomy of the cavernous nerves  I drew a diagram for him outlining the pelvic anatomy with regard to the bladder, prostate, and parasympathetic nerve plexus running laterally to the prostate  I will do everything that I safely can to attempt a nerve-sparing procedure if deemed to be appropriate intraoperatively  I did discuss with him that my priority is 1st to rid him of his cancer, 2nd to keep him dry, and 3rd to keep him with excellent sexual function  He does understand that his sexual function postoperatively is also a function of his current sexual function preoperatively and is closely tied to his overall general health  He understands that there are other therapies for erectile dysfunction the post-prostatectomy setting including injections and penile prosthesis placement  Additionally, I did stress to him that his post-operative sexual function will not be as good as his preoperative sexual function due to the nature of prostate surgery  I discussed other surgical complications including, but not limited to, a bladder neck contracture rate in the 5%-10% range any time after surgery, a rectal injury rate of 1% or less, an open conversion rate in the 1% range, a lymphocele causing symptoms and/or requiring intervention in the 5% range, and a transfusion rate in the 1% to 2% range   Additionally, he is to expect some degree of penile remodeling and loss of penile length after surgery  He will undergo medical optimization and clearance in preparation for surgery  As outlined above, the risks, benefits, potential complications, and alternative therapies were explained to the patient  Informed consent for the proposed procedure was obtained  Relevant Orders    Case request operating room: PROSTATECTOMY RADICAL W ROBOTICS, bilateral pelvic lymph node dissection (Completed)            Discussion:  Patient was given printed literature on his prostate cancer, intermediate risk  I did offer him referral to Radiation therapy, he does not want this  He has a  by leyla and has reviewed the literature extensively and wishes to undergo robotic radical prostatectomy  He denies previous abdominal surgeries  He denies a history of chest pain, shortness of breath, poor exercise tolerance, heart failure, and has no history of heart attacks or strokes  He denies a history of easy bruising and bleeding  Per the Isauro tables his chance of organ confined disease is 53%, chance of extracapsular extension is 32%, risk of lymph node involvement is 4%, and risk of seminal vesicle invasion is 11%  Informed consent was signed for robotic radical prostatectomy and bilateral pelvic lymph node dissection    Assessment and plan:       Please see problem oriented charting for the assessment plan of today's urological complaints    Rohan Grant MD      Chief Complaint     Chief Complaint   Patient presents with    biopsy results    Elevated PSA         History of Present Illness     Chantel Nicole is a 80 y o  gentleman that I have seen previously for elevated PSA  He had no post biopsy problems and had a pleasant post biopsy experience  Unfortunately, his biopsy is positive for intermediate risk prostate cancer, [place] 4 + 3 equals 7, PSA 6 8  He does not meet criteria for staging imaging      I had a long visit with him and with his wife today and answered all of her questions and concerns with regard to his new diagnosis  Please see above in problem oriented charting for details of today's discussion  The following portions of the patient's history were reviewed and updated as appropriate: allergies, current medications, past family history, past medical history, past social history, past surgical history and problem list     Detailed Urologic History     - please refer to HPI    Review of Systems     Review of Systems   Constitutional: Negative  HENT: Negative  Eyes: Negative  Respiratory: Negative  Cardiovascular: Negative  Gastrointestinal: Negative  Endocrine: Negative  Genitourinary: Negative  Musculoskeletal: Negative  Skin: Negative  Allergic/Immunologic: Negative  Neurological: Negative  Hematological: Negative  Psychiatric/Behavioral: Negative  Allergies     No Known Allergies    Physical Exam     Physical Exam   Constitutional: He is oriented to person, place, and time  He appears well-developed and well-nourished  No distress  HENT:   Head: Normocephalic and atraumatic  Right Ear: External ear normal    Left Ear: External ear normal    Nose: Nose normal    Eyes: Pupils are equal, round, and reactive to light  Conjunctivae and EOM are normal  Right eye exhibits no discharge  Left eye exhibits no discharge  No scleral icterus  Neck: Normal range of motion  Neck supple  Cardiovascular: Regular rhythm and intact distal pulses  Pulmonary/Chest: Effort normal  No stridor  No respiratory distress  He has no wheezes  Abdominal: Soft  He exhibits no distension and no mass  There is no tenderness  There is no rebound and no guarding  No hernia  No scars, no hernia   Musculoskeletal: He exhibits no edema, tenderness or deformity  Neurological: He is alert and oriented to person, place, and time  No cranial nerve deficit   Coordination normal  Skin: Skin is warm and dry  No rash noted  He is not diaphoretic  No erythema  No pallor  Psychiatric: He has a normal mood and affect  His behavior is normal  Judgment and thought content normal    Nursing note and vitals reviewed  Vital Signs  Vitals:    02/07/19 1056   BP: 130/86   BP Location: Left arm   Patient Position: Sitting   Cuff Size: Standard   Pulse: 67   Weight: 84 8 kg (187 lb)   Height: 5' 9" (1 753 m)         Current Medications       Current Outpatient Prescriptions:     atorvastatin (LIPITOR) 10 mg tablet, Take 1 tablet (10 mg total) by mouth daily for 90 days, Disp: 90 tablet, Rfl: 3    bisacodyl (FLEET) 10 MG/30ML ENEM, Insert 30 mL (10 mg total) into the rectum once for 1 dose Use this enema the morning of your prostate biopsy, Disp: 1 enema, Rfl: 0    FLUoxetine (PROzac) 20 mg capsule, Take 1 capsule (20 mg total) by mouth 2 (two) times a day for 90 days, Disp: 180 capsule, Rfl: 3    lisinopril (ZESTRIL) 10 mg tablet, Take 1 tablet (10 mg total) by mouth daily for 90 days, Disp: 90 tablet, Rfl: 3      Active Problems     Patient Active Problem List   Diagnosis    Prostate cancer screening    Essential hypertension    Hyperlipidemia, mixed    Elevated PSA    Prostate cancer [facility])         Past Medical History     Past Medical History:   Diagnosis Date    Hyperlipidemia     Hypertension     OCD (obsessive compulsive disorder)          Surgical History     Past Surgical History:   Procedure Laterality Date    KNEE SURGERY           Family History     History reviewed  No pertinent family history        Social History     Social History     History   Smoking Status    Never Smoker   Smokeless Tobacco    Never Used         Pertinent Lab Values     Lab Results   Component Value Date    CREATININE 1 19 10/04/2018       Lab Results   Component Value Date    PSA 6 8 (H) 01/17/2019    PSA 4 8 (H) 10/04/2018             Pertinent Imaging      There is no pertinent urological imaging for my review

## 2019-02-07 ENCOUNTER — OFFICE VISIT (OUTPATIENT)
Dept: UROLOGY | Facility: CLINIC | Age: 69
End: 2019-02-07
Payer: COMMERCIAL

## 2019-02-07 VITALS
HEART RATE: 67 BPM | HEIGHT: 69 IN | BODY MASS INDEX: 27.7 KG/M2 | DIASTOLIC BLOOD PRESSURE: 86 MMHG | WEIGHT: 187 LBS | SYSTOLIC BLOOD PRESSURE: 130 MMHG

## 2019-02-07 DIAGNOSIS — Z12.5 PROSTATE CANCER SCREENING: ICD-10-CM

## 2019-02-07 DIAGNOSIS — C61 PROSTATE CANCER (HCC): Primary | ICD-10-CM

## 2019-02-07 DIAGNOSIS — R97.20 ELEVATED PSA: ICD-10-CM

## 2019-02-07 PROCEDURE — 99215 OFFICE O/P EST HI 40 MIN: CPT | Performed by: UROLOGY

## 2019-02-07 NOTE — LETTER
February 7, 2019     OTILIA Mcguire  [address] [address] [place]    Patient: Renetta Malhotra   YOB: 1950   Date of Visit: 2/7/2019       Dear Dr Huber Ruiz:    Thank you for referring Waylon Mosley to me for evaluation  Below are my notes for this consultation  If you have questions, please do not hesitate to call me  I look forward to following your patient along with you  Sincerely,        Fallon Solis MD        CC: No Recipients  Fallon Solis MD  2/7/2019 11:52 AM  Sign at close encounter       Problem List Items Addressed This Visit     Prostate cancer screening     Elevated PSA, biopsy reveals intermediate risk prostate cancer         Elevated PSA     Found to be due to prostate cancer         Prostate cancer ([facility] [address] ) - Primary     I had a discussion with the patient regarding the natural history and treatment options for prostate cancer and the potential need for multimodal treatments  Active surveillance, surgical excision in the form of open or robotic surgery, and radiation therapies plus or minus androgen deprivation therapy were discussed at length with the patient  With regard to multimodal therapy discussion this could include surgical excision with postoperative radiation therapy (RT) +/-androgen deprivation therapy (ADT) if adverse pathology or RT with long-term ADT  I discussed robot assisted laparoscopic radical prostatectomy and bilateral pelvic lymph node dissection in depth with the patient  The Cloud[employer] SI and Xi surgical platforms were reviewed with the patient  I discussed that surgery has several potential advantages compared with radiation including the more accurate prognostic, pathologic information afforded by the surgical pathology, the more immediate indication of effective treatment as indicated by an undetectable PSA, as well as the relatively easier use of adjuvant or salvage radiotherapy postoperatively if the need arises       I discussed that in comparison with open surgery that robotic prostatectomy has the benefits of improved convalescence and decreased blood loss and appears to have equivalent cancer control rates and quality-of-life side effects such similar rates of urinary incontinence and erectile dysfunction  I discussed that most men stay in the hospital for 1-2 days and go home with a catheter for 7-10 days  Postoperatively, I discussed an aggressive penile rehabilitation approach with regular use of Viagra 50 mg every other day as well as possible use of a vacuum device to improve arterial oxygenation, which may improve nerve recovery  I discussed the side effects of surgery including stress incontinence, and I counseled him that most men leak urine immediately after surgery  I expect a stress incontinence rate at six months in the 10%-15% range, improving to 5%-10% in one year  I did communicate that there additional treatments for urinary incontinence that may be persistent in the post-prostatectomy setting  In terms of erectile dysfunction, we discussed the normal physiology of the erectile response as well as discussing the anatomy of the cavernous nerves  I drew a diagram for him outlining the pelvic anatomy with regard to the bladder, prostate, and parasympathetic nerve plexus running laterally to the prostate  I will do everything that I safely can to attempt a nerve-sparing procedure if deemed to be appropriate intraoperatively  I did discuss with him that my priority is 1st to rid him of his cancer, 2nd to keep him dry, and 3rd to keep him with excellent sexual function  He does understand that his sexual function postoperatively is also a function of his current sexual function preoperatively and is closely tied to his overall general health  He understands that there are other therapies for erectile dysfunction the post-prostatectomy setting including injections and penile prosthesis placement  Additionally, I did stress to him that his post-operative sexual function will not be as good as his preoperative sexual function due to the nature of prostate surgery  I discussed other surgical complications including, but not limited to, a bladder neck contracture rate in the 5%-10% range any time after surgery, a rectal injury rate of 1% or less, an open conversion rate in the 1% range, a lymphocele causing symptoms and/or requiring intervention in the 5% range, and a transfusion rate in the 1% to 2% range  Additionally, he is to expect some degree of penile remodeling and loss of penile length after surgery  He will undergo medical optimization and clearance in preparation for surgery  As outlined above, the risks, benefits, potential complications, and alternative therapies were explained to the patient  Informed consent for the proposed procedure was obtained  Relevant Orders    Case request operating room: PROSTATECTOMY RADICAL W ROBOTICS, bilateral pelvic lymph node dissection (Completed)            Discussion:  Patient was given printed literature on his prostate cancer, intermediate risk  I did offer him referral to Radiation therapy, he does not want this  He has a  by [facility] and has reviewed the literature extensively and wishes to undergo robotic radical prostatectomy  He denies previous abdominal surgeries  He denies a history of chest pain, shortness of breath, poor exercise tolerance, heart failure, and has no history of heart attacks or strokes  He denies a history of easy bruising and bleeding  Per the Isauro tables his chance of organ confined disease is 53%, chance of extracapsular extension is 32%, risk of lymph node involvement is 4%, and risk of seminal vesicle invasion is 11%      Informed consent was signed for robotic radical prostatectomy and bilateral pelvic lymph node dissection    Assessment and plan:       Please see problem oriented charting for the assessment plan of today's urological complaints    Bro Dewitt MD      Chief Complaint     Chief Complaint   Patient presents with    biopsy results    Elevated PSA         History of Present Illness     Anila Moore is a 80 y o  gentleman that I have seen previously for elevated PSA  He had no post biopsy problems and had a pleasant post biopsy experience  Unfortunately, his biopsy is positive for intermediate risk prostate cancer, [place] 4 + 3 equals 7, PSA 6 8  He does not meet criteria for staging imaging  I had a long visit with him and with his wife today and answered all of her questions and concerns with regard to his new diagnosis  Please see above in problem oriented charting for details of today's discussion  The following portions of the patient's history were reviewed and updated as appropriate: allergies, current medications, past family history, past medical history, past social history, past surgical history and problem list     Detailed Urologic History     - please refer to HPI    Review of Systems     Review of Systems   Constitutional: Negative  HENT: Negative  Eyes: Negative  Respiratory: Negative  Cardiovascular: Negative  Gastrointestinal: Negative  Endocrine: Negative  Genitourinary: Negative  Musculoskeletal: Negative  Skin: Negative  Allergic/Immunologic: Negative  Neurological: Negative  Hematological: Negative  Psychiatric/Behavioral: Negative  Allergies     No Known Allergies    Physical Exam     Physical Exam   Constitutional: He is oriented to person, place, and time  He appears well-developed and well-nourished  No distress  HENT:   Head: Normocephalic and atraumatic  Right Ear: External ear normal    Left Ear: External ear normal    Nose: Nose normal    Eyes: Pupils are equal, round, and reactive to light  Conjunctivae and EOM are normal  Right eye exhibits no discharge   Left eye exhibits no discharge  No scleral icterus  Neck: Normal range of motion  Neck supple  Cardiovascular: Regular rhythm and intact distal pulses  Pulmonary/Chest: Effort normal  No stridor  No respiratory distress  He has no wheezes  Abdominal: Soft  He exhibits no distension and no mass  There is no tenderness  There is no rebound and no guarding  No hernia  No scars, no hernia   Musculoskeletal: He exhibits no edema, tenderness or deformity  Neurological: He is alert and oriented to person, place, and time  No cranial nerve deficit  Coordination normal    Skin: Skin is warm and dry  No rash noted  He is not diaphoretic  No erythema  No pallor  Psychiatric: He has a normal mood and affect  His behavior is normal  Judgment and thought content normal    Nursing note and vitals reviewed            Vital Signs  Vitals:    02/07/19 1056   BP: 130/86   BP Location: Left arm   Patient Position: Sitting   Cuff Size: Standard   Pulse: 67   Weight: 84 8 kg (187 lb)   Height: 5' 9" (1 753 m)         Current Medications       Current Outpatient Prescriptions:     atorvastatin (LIPITOR) 10 mg tablet, Take 1 tablet (10 mg total) by mouth daily for 90 days, Disp: 90 tablet, Rfl: 3    bisacodyl (FLEET) 10 MG/30ML ENEM, Insert 30 mL (10 mg total) into the rectum once for 1 dose Use this enema the morning of your prostate biopsy, Disp: 1 enema, Rfl: 0    FLUoxetine (PROzac) 20 mg capsule, Take 1 capsule (20 mg total) by mouth 2 (two) times a day for 90 days, Disp: 180 capsule, Rfl: 3    lisinopril (ZESTRIL) 10 mg tablet, Take 1 tablet (10 mg total) by mouth daily for 90 days, Disp: 90 tablet, Rfl: 3      Active Problems     Patient Active Problem List   Diagnosis    Prostate cancer screening    Essential hypertension    Hyperlipidemia, mixed    Elevated PSA    Prostate cancer [facility])         Past Medical History     Past Medical History:   Diagnosis Date    Hyperlipidemia     Hypertension     OCD (obsessive compulsive disorder)          Surgical History     Past Surgical History:   Procedure Laterality Date    KNEE SURGERY           Family History     History reviewed  No pertinent family history        Social History     Social History     History   Smoking Status    Never Smoker   Smokeless Tobacco    Never Used         Pertinent Lab Values     Lab Results   Component Value Date    CREATININE 1 19 10/04/2018       Lab Results   Component Value Date    PSA 6 8 (H) 01/17/2019    PSA 4 8 (H) 10/04/2018             Pertinent Imaging      There is no pertinent urological imaging for my review

## 2019-02-07 NOTE — ASSESSMENT & PLAN NOTE
I had a discussion with the patient regarding the natural history and treatment options for prostate cancer and the potential need for multimodal treatments  Active surveillance, surgical excision in the form of open or robotic surgery, and radiation therapies plus or minus androgen deprivation therapy were discussed at length with the patient  With regard to multimodal therapy discussion this could include surgical excision with postoperative radiation therapy (RT) +/-androgen deprivation therapy (ADT) if adverse pathology or RT with long-term ADT  I discussed robot assisted laparoscopic radical prostatectomy and bilateral pelvic lymph node dissection in depth with the patient  The [employer] SI and Xi surgical platforms were reviewed with the patient  I discussed that surgery has several potential advantages compared with radiation including the more accurate prognostic, pathologic information afforded by the surgical pathology, the more immediate indication of effective treatment as indicated by an undetectable PSA, as well as the relatively easier use of adjuvant or salvage radiotherapy postoperatively if the need arises  I discussed that in comparison with open surgery that robotic prostatectomy has the benefits of improved convalescence and decreased blood loss and appears to have equivalent cancer control rates and quality-of-life side effects such similar rates of urinary incontinence and erectile dysfunction  I discussed that most men stay in the hospital for 1-2 days and go home with a catheter for 7-10 days  Postoperatively, I discussed an aggressive penile rehabilitation approach with regular use of Viagra 50 mg every other day as well as possible use of a vacuum device to improve arterial oxygenation, which may improve nerve recovery  I discussed the side effects of surgery including stress incontinence, and I counseled him that most men leak urine immediately after surgery   I expect a stress incontinence rate at six months in the 10%-15% range, improving to 5%-10% in one year  I did communicate that there additional treatments for urinary incontinence that may be persistent in the post-prostatectomy setting  In terms of erectile dysfunction, we discussed the normal physiology of the erectile response as well as discussing the anatomy of the cavernous nerves  I drew a diagram for him outlining the pelvic anatomy with regard to the bladder, prostate, and parasympathetic nerve plexus running laterally to the prostate  I will do everything that I safely can to attempt a nerve-sparing procedure if deemed to be appropriate intraoperatively  I did discuss with him that my priority is 1st to rid him of his cancer, 2nd to keep him dry, and 3rd to keep him with excellent sexual function  He does understand that his sexual function postoperatively is also a function of his current sexual function preoperatively and is closely tied to his overall general health  He understands that there are other therapies for erectile dysfunction the post-prostatectomy setting including injections and penile prosthesis placement  Additionally, I did stress to him that his post-operative sexual function will not be as good as his preoperative sexual function due to the nature of prostate surgery  I discussed other surgical complications including, but not limited to, a bladder neck contracture rate in the 5%-10% range any time after surgery, a rectal injury rate of 1% or less, an open conversion rate in the 1% range, a lymphocele causing symptoms and/or requiring intervention in the 5% range, and a transfusion rate in the 1% to 2% range  Additionally, he is to expect some degree of penile remodeling and loss of penile length after surgery  He will undergo medical optimization and clearance in preparation for surgery    As outlined above, the risks, benefits, potential complications, and alternative therapies were explained to the patient  Informed consent for the proposed procedure was obtained

## 2019-02-15 NOTE — PRE-PROCEDURE INSTRUCTIONS
Pre-Surgery Instructions:   Medication Instructions    atorvastatin (LIPITOR) 10 mg tablet Instructed patient per Anesthesia Guidelines   co-enzyme Q-10 30 MG capsule Patient was instructed by Physician and understands   FLUoxetine (PROzac) 20 mg capsule Instructed patient per Anesthesia Guidelines   lisinopril (ZESTRIL) 10 mg tablet Instructed patient per Anesthesia Guidelines  Pre op and bathing instructions reviewed   Pt will get hibiclens

## 2019-02-25 ENCOUNTER — APPOINTMENT (OUTPATIENT)
Dept: PREADMISSION TESTING | Facility: HOSPITAL | Age: 69
End: 2019-02-25
Payer: COMMERCIAL

## 2019-02-25 ENCOUNTER — APPOINTMENT (OUTPATIENT)
Dept: LAB | Facility: CLINIC | Age: 69
End: 2019-02-25
Payer: COMMERCIAL

## 2019-02-25 DIAGNOSIS — C61 PROSTATE CANCER (HCC): ICD-10-CM

## 2019-02-25 LAB
ABO GROUP BLD: NORMAL
ANION GAP SERPL CALCULATED.3IONS-SCNC: 11 MMOL/L (ref 4–13)
APTT PPP: 35 SECONDS (ref 26–38)
ATRIAL RATE: 62 BPM
BASOPHILS # BLD AUTO: 0.05 THOUSANDS/ΜL (ref 0–0.1)
BASOPHILS NFR BLD AUTO: 1 % (ref 0–1)
BLD GP AB SCN SERPL QL: NEGATIVE
BUN SERPL-MCNC: 21 MG/DL (ref 5–25)
CALCIUM SERPL-MCNC: 8.9 MG/DL (ref 8.3–10.1)
CHLORIDE SERPL-SCNC: 104 MMOL/L (ref 100–108)
CO2 SERPL-SCNC: 27 MMOL/L (ref 21–32)
CREAT SERPL-MCNC: 1.23 MG/DL (ref 0.6–1.3)
EOSINOPHIL # BLD AUTO: 0.06 THOUSAND/ΜL (ref 0–0.61)
EOSINOPHIL NFR BLD AUTO: 1 % (ref 0–6)
ERYTHROCYTE [DISTWIDTH] IN BLOOD BY AUTOMATED COUNT: 12 % (ref 11.6–15.1)
GFR SERPL CREATININE-BSD FRML MDRD: 60 ML/MIN/1.73SQ M
GLUCOSE SERPL-MCNC: 89 MG/DL (ref 65–140)
HCT VFR BLD AUTO: 47.1 % (ref 36.5–49.3)
HGB BLD-MCNC: 15.7 G/DL (ref 12–17)
IMM GRANULOCYTES # BLD AUTO: 0.02 THOUSAND/UL (ref 0–0.2)
IMM GRANULOCYTES NFR BLD AUTO: 0 % (ref 0–2)
INR PPP: 1.04 (ref 0.86–1.17)
LYMPHOCYTES # BLD AUTO: 1.32 THOUSANDS/ΜL (ref 0.6–4.47)
LYMPHOCYTES NFR BLD AUTO: 21 % (ref 14–44)
MCH RBC QN AUTO: 29.6 PG (ref 26.8–34.3)
MCHC RBC AUTO-ENTMCNC: 33.3 G/DL (ref 31.4–37.4)
MCV RBC AUTO: 89 FL (ref 82–98)
MONOCYTES # BLD AUTO: 0.61 THOUSAND/ΜL (ref 0.17–1.22)
MONOCYTES NFR BLD AUTO: 10 % (ref 4–12)
NEUTROPHILS # BLD AUTO: 4.35 THOUSANDS/ΜL (ref 1.85–7.62)
NEUTS SEG NFR BLD AUTO: 67 % (ref 43–75)
NRBC BLD AUTO-RTO: 0 /100 WBCS
P AXIS: 46 DEGREES
PLATELET # BLD AUTO: 247 THOUSANDS/UL (ref 149–390)
PMV BLD AUTO: 9.3 FL (ref 8.9–12.7)
POTASSIUM SERPL-SCNC: 3.7 MMOL/L (ref 3.5–5.3)
PR INTERVAL: 178 MS
PROTHROMBIN TIME: 13.3 SECONDS (ref 11.8–14.2)
QRS AXIS: -26 DEGREES
QRSD INTERVAL: 92 MS
QT INTERVAL: 424 MS
QTC INTERVAL: 430 MS
RBC # BLD AUTO: 5.3 MILLION/UL (ref 3.88–5.62)
RH BLD: POSITIVE
SODIUM SERPL-SCNC: 142 MMOL/L (ref 136–145)
SPECIMEN EXPIRATION DATE: NORMAL
T WAVE AXIS: 20 DEGREES
VENTRICULAR RATE: 62 BPM
WBC # BLD AUTO: 6.41 THOUSAND/UL (ref 4.31–10.16)

## 2019-02-25 PROCEDURE — 93005 ELECTROCARDIOGRAM TRACING: CPT

## 2019-02-25 PROCEDURE — 86850 RBC ANTIBODY SCREEN: CPT

## 2019-02-25 PROCEDURE — 36415 COLL VENOUS BLD VENIPUNCTURE: CPT

## 2019-02-25 PROCEDURE — 85610 PROTHROMBIN TIME: CPT

## 2019-02-25 PROCEDURE — 85025 COMPLETE CBC W/AUTO DIFF WBC: CPT

## 2019-02-25 PROCEDURE — 86901 BLOOD TYPING SEROLOGIC RH(D): CPT

## 2019-02-25 PROCEDURE — 86900 BLOOD TYPING SEROLOGIC ABO: CPT

## 2019-02-25 PROCEDURE — 85730 THROMBOPLASTIN TIME PARTIAL: CPT

## 2019-02-25 PROCEDURE — 93010 ELECTROCARDIOGRAM REPORT: CPT | Performed by: INTERNAL MEDICINE

## 2019-02-25 PROCEDURE — 80048 BASIC METABOLIC PNL TOTAL CA: CPT

## 2019-02-27 ENCOUNTER — OFFICE VISIT (OUTPATIENT)
Dept: FAMILY MEDICINE CLINIC | Facility: CLINIC | Age: 69
End: 2019-02-27
Payer: COMMERCIAL

## 2019-02-27 VITALS
OXYGEN SATURATION: 98 % | TEMPERATURE: 97.4 F | DIASTOLIC BLOOD PRESSURE: 88 MMHG | WEIGHT: 188.44 LBS | BODY MASS INDEX: 27.91 KG/M2 | HEIGHT: 69 IN | SYSTOLIC BLOOD PRESSURE: 138 MMHG | RESPIRATION RATE: 18 BRPM | HEART RATE: 74 BPM

## 2019-02-27 DIAGNOSIS — L40.9 PSORIASIS: ICD-10-CM

## 2019-02-27 DIAGNOSIS — C61 PROSTATE CANCER (HCC): ICD-10-CM

## 2019-02-27 DIAGNOSIS — E78.2 HYPERLIPIDEMIA, MIXED: ICD-10-CM

## 2019-02-27 DIAGNOSIS — Z01.818 PREOP EXAMINATION: Primary | ICD-10-CM

## 2019-02-27 DIAGNOSIS — I10 ESSENTIAL HYPERTENSION: ICD-10-CM

## 2019-02-27 PROCEDURE — 99214 OFFICE O/P EST MOD 30 MIN: CPT | Performed by: NURSE PRACTITIONER

## 2019-02-27 PROCEDURE — 3075F SYST BP GE 130 - 139MM HG: CPT | Performed by: NURSE PRACTITIONER

## 2019-02-27 PROCEDURE — 3008F BODY MASS INDEX DOCD: CPT | Performed by: NURSE PRACTITIONER

## 2019-02-27 PROCEDURE — 1160F RVW MEDS BY RX/DR IN RCRD: CPT | Performed by: NURSE PRACTITIONER

## 2019-02-27 PROCEDURE — 3725F SCREEN DEPRESSION PERFORMED: CPT | Performed by: NURSE PRACTITIONER

## 2019-02-27 PROCEDURE — 3079F DIAST BP 80-89 MM HG: CPT | Performed by: NURSE PRACTITIONER

## 2019-02-27 NOTE — PATIENT INSTRUCTIONS
Preop evaluation- Cleared for surgery   Obtain CXR prior to surgery  Hypertension- stable  Hyperlipidemia- controlled  Psoriasis- refill medication  Follow up in 4 months

## 2019-02-27 NOTE — PROGRESS NOTES
Subjective:     81 yo male who presents to the office today for a preoperative consultation at the request of surgeon Dr Sheree Yun who plans on performing prostectomy on March 18  This consultation is requested for the specific conditions prompting preoperative evaluation (i e  because of potential affect on operative risk): hypertension  Planned anesthesia: general  The patient has the following known anesthesia issues: no previous issues  Patients bleeding risk: no recent abnormal bleeding  Patient does not have objections to receiving blood products if needed  The following portions of the patient's history were reviewed and updated as appropriate:   He  has a past medical history of Arthritis, Cancer ([facility] [address] ), Hyperlipidemia, Hypertension, and OCD (obsessive compulsive disorder)  He   Patient Active Problem List    Diagnosis Date Noted    Preop examination 02/27/2019    Psoriasis 02/27/2019    Prostate cancer ([facility] [address] ) 02/07/2019    Elevated PSA 01/16/2019    Prostate cancer screening 10/04/2018    Essential hypertension 10/04/2018    Hyperlipidemia, mixed 10/04/2018     He  has a past surgical history that includes Knee surgery (Right) and Finger amputation (Left)  His family history is not on file  He  reports that he has never smoked  He has never used smokeless tobacco  He reports that he does not drink alcohol or use drugs    Current Outpatient Medications   Medication Sig Dispense Refill    atorvastatin (LIPITOR) 10 mg tablet Take 1 tablet (10 mg total) by mouth daily for 90 days 90 tablet 3    co-enzyme Q-10 30 MG capsule Take 200 mg by mouth daily      fluocinonide (LIDEX) 0 05 % cream Apply topically 2 (two) times a day for 7 days 30 g 3    FLUoxetine (PROzac) 20 mg capsule Take 1 capsule (20 mg total) by mouth 2 (two) times a day for 90 days 180 capsule 3    lisinopril (ZESTRIL) 10 mg tablet Take 1 tablet (10 mg total) by mouth daily for 90 days 90 tablet 3     No current facility-administered medications for this visit  Current Outpatient Medications on File Prior to Visit   Medication Sig    atorvastatin (LIPITOR) 10 mg tablet Take 1 tablet (10 mg total) by mouth daily for 90 days    co-enzyme Q-10 30 MG capsule Take 200 mg by mouth daily    FLUoxetine (PROzac) 20 mg capsule Take 1 capsule (20 mg total) by mouth 2 (two) times a day for 90 days    lisinopril (ZESTRIL) 10 mg tablet Take 1 tablet (10 mg total) by mouth daily for 90 days    [DISCONTINUED] bisacodyl (FLEET) 10 MG/30ML ENEM Insert 30 mL (10 mg total) into the rectum once for 1 dose Use this enema the morning of your prostate biopsy     No current facility-administered medications on file prior to visit  He has No Known Allergies       Review of Systems  Pertinent items are noted in HPI       Objective:     /88 (BP Location: Left arm, Patient Position: Sitting)   Pulse 74   Temp (!) 97 4 °F (36 3 °C)   Resp 18   Ht 5' 9" (1 753 m)   Wt 85 5 kg (188 lb 7 oz)   SpO2 98%   BMI 27 83 kg/m²     General Appearance:    Alert, cooperative, no distress, appears stated age   Head:    Normocephalic, without obvious abnormality, atraumatic   Eyes:    PERRL, conjunctiva/corneas clear, EOM's intact, fundi     benign, both eyes        Ears:    Normal TM's and external ear canals, both ears   Nose:   Nares normal, septum midline, mucosa normal, no drainage    or sinus tenderness   Throat:   Lips, mucosa, and tongue normal; teeth and gums normal   Neck:   Supple, symmetrical, trachea midline, no adenopathy;        thyroid:  No enlargement/tenderness/nodules; no carotid    bruit or JVD   Back:     Symmetric, no curvature, ROM normal, no CVA tenderness   Lungs:     Clear to auscultation bilaterally, respirations unlabored   Chest wall:    No tenderness or deformity   Heart:    Regular rate and rhythm, S1 and S2 normal, no murmur, rub   or gallop   Abdomen:     Soft, non-tender, bowel sounds active all four quadrants,     no masses, no organomegaly   Genitalia:       Rectal:       Extremities:   Extremities normal, atraumatic, no cyanosis or edema   Pulses:   2+ and symmetric all extremities   Skin:   Skin color, texture, turgor normal, no rashes or lesions   Lymph nodes:   Cervical, supraclavicular, and axillary nodes normal   Neurologic:   CNII-XII intact   Normal strength, sensation and reflexes       throughout         Cardiographics  ECG: normal sinus rhythm, no blocks or conduction defects, no ischemic changes    Imaging  Chest x-ray: none ordered     Lab Review   Appointment on 02/25/2019   Component Date Value    ABO Grouping 02/25/2019 A     Rh Factor 02/25/2019 Positive     Antibody Screen 02/25/2019 Negative     Specimen Expiration Date 02/25/2019 64740291     Sodium 02/25/2019 142     Potassium 02/25/2019 3 7     Chloride 02/25/2019 104     CO2 02/25/2019 27     ANION GAP 02/25/2019 11     BUN 02/25/2019 21     Creatinine 02/25/2019 1 23     Glucose 02/25/2019 89     Calcium 02/25/2019 8 9     eGFR 02/25/2019 60     WBC 02/25/2019 6 41     RBC 02/25/2019 5 30     Hemoglobin 02/25/2019 15 7     Hematocrit 02/25/2019 47 1     MCV 02/25/2019 89     MCH 02/25/2019 29 6     MCHC 02/25/2019 33 3     RDW 02/25/2019 12 0     MPV 02/25/2019 9 3     Platelets 01/18/4835 247     nRBC 02/25/2019 0     Neutrophils Relative 02/25/2019 67     Immat GRANS % 02/25/2019 0     Lymphocytes Relative 02/25/2019 21     Monocytes Relative 02/25/2019 10     Eosinophils Relative 02/25/2019 1     Basophils Relative 02/25/2019 1     Neutrophils Absolute 02/25/2019 4 35     Immature Grans Absolute 02/25/2019 0 02     Lymphocytes Absolute 02/25/2019 1 32     Monocytes Absolute 02/25/2019 0 61     Eosinophils Absolute 02/25/2019 0 06     Basophils Absolute 02/25/2019 0 05     PTT 02/25/2019 35     Protime 02/25/2019 13 3     INR 02/25/2019 1 04    Appointment on 02/25/2019   Component Date Value    Ventricular Rate 02/25/2019 62     Atrial Rate 02/25/2019 62     UT Interval 02/25/2019 178     QRSD Interval 02/25/2019 92     QT Interval 02/25/2019 424     QTC Interval 02/25/2019 430     P Axis 02/25/2019 46     QRS Axis 02/25/2019 -26     T Wave Axis 02/25/2019 20    Procedure visit on 01/25/2019   Component Date Value    Case Report 01/25/2019                      Value:Surgical Pathology Report                         Case: [id number]                                   Authorizing Provider:  Alessia Grajeda MD        Collected:           01/25/2019 1022              Ordering Location:     [facility]        Received:            01/25/2019 1022                                     Urology [facility] [place]                                                     Pathologist:           Radhika Mckoy MD                                                          Specimens:   A) - Prostate, Right Lateral Base                                                                   B) - Prostate, Right Medial Base                                                                    C) - Prostate, Right Lateral Mid                                                                    D) - Prostate, Right Medial Mid                                                                     E) - Prostate, Right Lateral [place]                                                                                             F) - Prostate, Right Medial [place]                                                                    G) - Prostate, Left Lateral Base                                                                    H) - Prostate, Left Medial Base                                                                     I) - Prostate, Left Lateral Mid                                                                     J) - Prostate,  Left Medial Mid                                                                     K) - Prostate, Left Lateral [place] L) - Prostate, Left Medial [place]                                                            Final Diagnosis 01/25/2019                      Value: This result contains rich text formatting which cannot be displayed here   Additional Information 01/25/2019                      Value: This result contains rich text formatting which cannot be displayed here  Pili Rosen Gross Description 01/25/2019                      Value: This result contains rich text formatting which cannot be displayed here  Orders Only on 01/17/2019   Component Date Value    Prostate Specific Antige* 01/17/2019 6 8*    PSA, Free 01/17/2019 0 40     PSA, Free Pct 01/17/2019 5 9    Appointment on 10/04/2018   Component Date Value    Sodium 10/04/2018 137     Potassium 10/04/2018 4 1     Chloride 10/04/2018 104     CO2 10/04/2018 28     ANION GAP 10/04/2018 5     BUN 10/04/2018 15     Creatinine 10/04/2018 1 19     Glucose, Fasting 10/04/2018 81     Calcium 10/04/2018 8 9     AST 10/04/2018 15     ALT 10/04/2018 24     Alkaline Phosphatase 10/04/2018 74     Total Protein 10/04/2018 7 4     Albumin 10/04/2018 3 7     Total Bilirubin 10/04/2018 0 52     eGFR 10/04/2018 63     Cholesterol 10/04/2018 155     Triglycerides 10/04/2018 167*    HDL, Direct 10/04/2018 37*    LDL Calculated 10/04/2018 85     Non-HDL-Chol (CHOL-HDL) 10/04/2018 118     PSA 10/04/2018 4 8*        Assessment:     59 y o  female with planned surgery as above  Known risk factors for perioperative complications: None    Difficulty with intubation is not anticipated  Cardiac Risk Estimation: LOW    Current medications which may produce withdrawal symptoms if withheld perioperatively: none     Plan:     1  Preoperative workup as follows chest x-ray, ECG, hemoglobin, hematocrit, electrolytes, creatinine, glucose, liver function studies    2  Change in medication regimen before surgery: none, continue medication regimen including morning of surgery, with sip of water

## 2019-03-07 ENCOUNTER — APPOINTMENT (OUTPATIENT)
Dept: RADIOLOGY | Facility: CLINIC | Age: 69
End: 2019-03-07
Payer: COMMERCIAL

## 2019-03-07 DIAGNOSIS — Z01.818 PREOP EXAMINATION: ICD-10-CM

## 2019-03-07 PROCEDURE — 71046 X-RAY EXAM CHEST 2 VIEWS: CPT

## 2019-03-11 ENCOUNTER — TELEPHONE (OUTPATIENT)
Dept: UROLOGY | Facility: HOSPITAL | Age: 69
End: 2019-03-11

## 2019-03-11 NOTE — TELEPHONE ENCOUNTER
[place] is scheduled for RALP with [facility] on 3/18/19 with Dr Elizabeth March  Called patient  L/M [phone] to call  Gave patient my direct extension

## 2019-03-12 NOTE — TELEPHONE ENCOUNTER
Patient called back  Bowel prep reviewed with patient  He is aware that he will have Mendiola for 1-2 weeks after surgery  He is aware of date, time, and location of P/O visit with Dr Jaquelin Nelson  P/O restrictions reviewed with Angel including no lifting greater than 10 lbs for 6 weeks after surgery and no driving until P/O visit  Made him aware that he should walk frequently as tolerated after surgery  He lives with his wife who will help him during his recovery  He does not have any transportation issues  He met with PATs  and was instructed by them not to take lisinopril and Co-Q 10 the morning of surgery  Jerry Beaulieu my direct extension to call with questions

## 2019-03-17 ENCOUNTER — ANESTHESIA EVENT (OUTPATIENT)
Dept: PERIOP | Facility: HOSPITAL | Age: 69
DRG: 708 | End: 2019-03-17
Payer: COMMERCIAL

## 2019-03-18 ENCOUNTER — ANESTHESIA (OUTPATIENT)
Dept: PERIOP | Facility: HOSPITAL | Age: 69
DRG: 708 | End: 2019-03-18
Payer: COMMERCIAL

## 2019-03-18 ENCOUNTER — HOSPITAL ENCOUNTER (INPATIENT)
Facility: HOSPITAL | Age: 69
LOS: 1 days | Discharge: HOME | DRG: 708 | End: 2019-03-19
Attending: UROLOGY | Admitting: UROLOGY
Payer: COMMERCIAL

## 2019-03-18 ENCOUNTER — TELEPHONE (OUTPATIENT)
Dept: UROLOGY | Facility: CLINIC | Age: 69
End: 2019-03-18

## 2019-03-18 DIAGNOSIS — C61 PROSTATE CANCER (HCC): Primary | ICD-10-CM

## 2019-03-18 LAB
ABO GROUP BLD: NORMAL
ANION GAP SERPL CALCULATED.3IONS-SCNC: 9 MMOL/L (ref 4–13)
BLD GP AB SCN SERPL QL: NEGATIVE
BUN SERPL-MCNC: 15 MG/DL (ref 5–25)
CALCIUM SERPL-MCNC: 8.1 MG/DL (ref 8.3–10.1)
CHLORIDE SERPL-SCNC: 107 MMOL/L (ref 100–108)
CO2 SERPL-SCNC: 25 MMOL/L (ref 21–32)
CREAT SERPL-MCNC: 1.2 MG/DL (ref 0.6–1.3)
ERYTHROCYTE [DISTWIDTH] IN BLOOD BY AUTOMATED COUNT: 12.1 % (ref 11.6–15.1)
GFR SERPL CREATININE-BSD FRML MDRD: 62 ML/MIN/1.73SQ M
GLUCOSE P FAST SERPL-MCNC: 149 MG/DL (ref 65–99)
GLUCOSE SERPL-MCNC: 149 MG/DL (ref 65–140)
HCT VFR BLD AUTO: 40.6 % (ref 36.5–49.3)
HGB BLD-MCNC: 13.3 G/DL (ref 12–17)
MCH RBC QN AUTO: 29.8 PG (ref 26.8–34.3)
MCHC RBC AUTO-ENTMCNC: 32.8 G/DL (ref 31.4–37.4)
MCV RBC AUTO: 91 FL (ref 82–98)
PLATELET # BLD AUTO: 261 THOUSANDS/UL (ref 149–390)
PMV BLD AUTO: 9.1 FL (ref 8.9–12.7)
POTASSIUM SERPL-SCNC: 4.8 MMOL/L (ref 3.5–5.3)
RBC # BLD AUTO: 4.46 MILLION/UL (ref 3.88–5.62)
RH BLD: POSITIVE
SODIUM SERPL-SCNC: 141 MMOL/L (ref 136–145)
SPECIMEN EXPIRATION DATE: NORMAL
WBC # BLD AUTO: 12.37 THOUSAND/UL (ref 4.31–10.16)

## 2019-03-18 PROCEDURE — 86850 RBC ANTIBODY SCREEN: CPT | Performed by: UROLOGY

## 2019-03-18 PROCEDURE — 0VT04ZZ RESECTION OF PROSTATE, PERCUTANEOUS ENDOSCOPIC APPROACH: ICD-10-PCS | Performed by: UROLOGY

## 2019-03-18 PROCEDURE — 88305 TISSUE EXAM BY PATHOLOGIST: CPT | Performed by: PATHOLOGY

## 2019-03-18 PROCEDURE — 88344 IMHCHEM/IMCYTCHM EA MLT ANTB: CPT | Performed by: PATHOLOGY

## 2019-03-18 PROCEDURE — 88342 IMHCHEM/IMCYTCHM 1ST ANTB: CPT | Performed by: PATHOLOGY

## 2019-03-18 PROCEDURE — 07BC4ZZ EXCISION OF PELVIS LYMPHATIC, PERCUTANEOUS ENDOSCOPIC APPROACH: ICD-10-PCS | Performed by: UROLOGY

## 2019-03-18 PROCEDURE — 86900 BLOOD TYPING SEROLOGIC ABO: CPT | Performed by: UROLOGY

## 2019-03-18 PROCEDURE — 88309 TISSUE EXAM BY PATHOLOGIST: CPT | Performed by: PATHOLOGY

## 2019-03-18 PROCEDURE — 38571 LAPAROSCOPY LYMPHADENECTOMY: CPT | Performed by: UROLOGY

## 2019-03-18 PROCEDURE — 38571 LAPAROSCOPY LYMPHADENECTOMY: CPT | Performed by: PHYSICIAN ASSISTANT

## 2019-03-18 PROCEDURE — 88307 TISSUE EXAM BY PATHOLOGIST: CPT | Performed by: PATHOLOGY

## 2019-03-18 PROCEDURE — 80048 BASIC METABOLIC PNL TOTAL CA: CPT | Performed by: UROLOGY

## 2019-03-18 PROCEDURE — 85027 COMPLETE CBC AUTOMATED: CPT | Performed by: UROLOGY

## 2019-03-18 PROCEDURE — 55866 LAPS SURG PRST8ECT RPBIC RAD: CPT | Performed by: UROLOGY

## 2019-03-18 PROCEDURE — 55866 LAPS SURG PRST8ECT RPBIC RAD: CPT | Performed by: PHYSICIAN ASSISTANT

## 2019-03-18 PROCEDURE — 86901 BLOOD TYPING SEROLOGIC RH(D): CPT | Performed by: UROLOGY

## 2019-03-18 PROCEDURE — 8E0W4CZ ROBOTIC ASSISTED PROCEDURE OF TRUNK REGION, PERCUTANEOUS ENDOSCOPIC APPROACH: ICD-10-PCS | Performed by: UROLOGY

## 2019-03-18 PROCEDURE — 86923 COMPATIBILITY TEST ELECTRIC: CPT

## 2019-03-18 RX ORDER — SODIUM CHLORIDE 9 MG/ML
INJECTION, SOLUTION INTRAVENOUS CONTINUOUS PRN
Status: DISCONTINUED | OUTPATIENT
Start: 2019-03-18 | End: 2019-03-18 | Stop reason: SURG

## 2019-03-18 RX ORDER — OXYCODONE HYDROCHLORIDE 10 MG/1
10 TABLET ORAL EVERY 4 HOURS PRN
Status: DISCONTINUED | OUTPATIENT
Start: 2019-03-18 | End: 2019-03-19 | Stop reason: HOSPADM

## 2019-03-18 RX ORDER — POLYETHYLENE GLYCOL 3350 17 G/17G
17 POWDER, FOR SOLUTION ORAL DAILY
Status: DISCONTINUED | OUTPATIENT
Start: 2019-03-18 | End: 2019-03-19 | Stop reason: HOSPADM

## 2019-03-18 RX ORDER — LIDOCAINE HYDROCHLORIDE 10 MG/ML
INJECTION, SOLUTION INFILTRATION; PERINEURAL AS NEEDED
Status: DISCONTINUED | OUTPATIENT
Start: 2019-03-18 | End: 2019-03-18 | Stop reason: SURG

## 2019-03-18 RX ORDER — DOCUSATE SODIUM 100 MG/1
100 CAPSULE, LIQUID FILLED ORAL 3 TIMES DAILY
Qty: 90 CAPSULE | Refills: 0 | Status: SHIPPED | OUTPATIENT
Start: 2019-03-18 | End: 2019-09-18

## 2019-03-18 RX ORDER — ALBUTEROL SULFATE 2.5 MG/3ML
2.5 SOLUTION RESPIRATORY (INHALATION) ONCE AS NEEDED
Status: DISCONTINUED | OUTPATIENT
Start: 2019-03-18 | End: 2019-03-18 | Stop reason: HOSPADM

## 2019-03-18 RX ORDER — DIPHENHYDRAMINE HCL 25 MG
25 TABLET ORAL EVERY 6 HOURS PRN
Status: DISCONTINUED | OUTPATIENT
Start: 2019-03-18 | End: 2019-03-19 | Stop reason: HOSPADM

## 2019-03-18 RX ORDER — MEPERIDINE HYDROCHLORIDE 25 MG/ML
12.5 INJECTION INTRAMUSCULAR; INTRAVENOUS; SUBCUTANEOUS AS NEEDED
Status: DISCONTINUED | OUTPATIENT
Start: 2019-03-18 | End: 2019-03-18 | Stop reason: HOSPADM

## 2019-03-18 RX ORDER — ONDANSETRON 2 MG/ML
4 INJECTION INTRAMUSCULAR; INTRAVENOUS EVERY 6 HOURS PRN
Status: DISCONTINUED | OUTPATIENT
Start: 2019-03-18 | End: 2019-03-19 | Stop reason: HOSPADM

## 2019-03-18 RX ORDER — ROCURONIUM BROMIDE 10 MG/ML
INJECTION, SOLUTION INTRAVENOUS AS NEEDED
Status: DISCONTINUED | OUTPATIENT
Start: 2019-03-18 | End: 2019-03-18 | Stop reason: SURG

## 2019-03-18 RX ORDER — ACETAMINOPHEN 325 MG/1
650 TABLET ORAL EVERY 4 HOURS PRN
Status: DISCONTINUED | OUTPATIENT
Start: 2019-03-18 | End: 2019-03-19 | Stop reason: HOSPADM

## 2019-03-18 RX ORDER — BUPIVACAINE HYDROCHLORIDE 2.5 MG/ML
INJECTION, SOLUTION INFILTRATION; PERINEURAL AS NEEDED
Status: DISCONTINUED | OUTPATIENT
Start: 2019-03-18 | End: 2019-03-18 | Stop reason: HOSPADM

## 2019-03-18 RX ORDER — HYDRALAZINE HYDROCHLORIDE 20 MG/ML
5 INJECTION INTRAMUSCULAR; INTRAVENOUS EVERY 6 HOURS PRN
Status: DISCONTINUED | OUTPATIENT
Start: 2019-03-18 | End: 2019-03-19 | Stop reason: HOSPADM

## 2019-03-18 RX ORDER — MAGNESIUM HYDROXIDE 1200 MG/15ML
LIQUID ORAL AS NEEDED
Status: DISCONTINUED | OUTPATIENT
Start: 2019-03-18 | End: 2019-03-18 | Stop reason: HOSPADM

## 2019-03-18 RX ORDER — OXYCODONE HYDROCHLORIDE 5 MG/1
5 TABLET ORAL EVERY 4 HOURS PRN
Status: DISCONTINUED | OUTPATIENT
Start: 2019-03-18 | End: 2019-03-19 | Stop reason: HOSPADM

## 2019-03-18 RX ORDER — CYCLOBENZAPRINE HCL 10 MG
5 TABLET ORAL 3 TIMES DAILY
Status: DISCONTINUED | OUTPATIENT
Start: 2019-03-18 | End: 2019-03-19 | Stop reason: HOSPADM

## 2019-03-18 RX ORDER — DOCUSATE SODIUM 100 MG/1
100 CAPSULE, LIQUID FILLED ORAL 2 TIMES DAILY
Status: DISCONTINUED | OUTPATIENT
Start: 2019-03-18 | End: 2019-03-19 | Stop reason: HOSPADM

## 2019-03-18 RX ORDER — CEFAZOLIN SODIUM 2 G/50ML
2000 SOLUTION INTRAVENOUS ONCE
Status: COMPLETED | OUTPATIENT
Start: 2019-03-18 | End: 2019-03-18

## 2019-03-18 RX ORDER — MIDAZOLAM HYDROCHLORIDE 1 MG/ML
INJECTION INTRAMUSCULAR; INTRAVENOUS AS NEEDED
Status: DISCONTINUED | OUTPATIENT
Start: 2019-03-18 | End: 2019-03-18 | Stop reason: SURG

## 2019-03-18 RX ORDER — HYDROMORPHONE HCL/PF 1 MG/ML
0.5 SYRINGE (ML) INJECTION
Status: DISCONTINUED | OUTPATIENT
Start: 2019-03-18 | End: 2019-03-18 | Stop reason: HOSPADM

## 2019-03-18 RX ORDER — PROPOFOL 10 MG/ML
INJECTION, EMULSION INTRAVENOUS AS NEEDED
Status: DISCONTINUED | OUTPATIENT
Start: 2019-03-18 | End: 2019-03-18 | Stop reason: SURG

## 2019-03-18 RX ORDER — FLUOXETINE HYDROCHLORIDE 20 MG/1
40 CAPSULE ORAL DAILY
Status: DISCONTINUED | OUTPATIENT
Start: 2019-03-19 | End: 2019-03-19 | Stop reason: HOSPADM

## 2019-03-18 RX ORDER — ONDANSETRON 2 MG/ML
INJECTION INTRAMUSCULAR; INTRAVENOUS AS NEEDED
Status: DISCONTINUED | OUTPATIENT
Start: 2019-03-18 | End: 2019-03-18 | Stop reason: SURG

## 2019-03-18 RX ORDER — OXYCODONE HYDROCHLORIDE AND ACETAMINOPHEN 5; 325 MG/1; MG/1
1 TABLET ORAL EVERY 6 HOURS PRN
Qty: 12 TABLET | Refills: 0 | Status: SHIPPED | OUTPATIENT
Start: 2019-03-18 | End: 2019-03-19 | Stop reason: SDUPTHER

## 2019-03-18 RX ORDER — CEFAZOLIN SODIUM 2 G/50ML
2000 SOLUTION INTRAVENOUS EVERY 8 HOURS
Status: COMPLETED | OUTPATIENT
Start: 2019-03-18 | End: 2019-03-19

## 2019-03-18 RX ORDER — GLYCOPYRROLATE 0.2 MG/ML
INJECTION INTRAMUSCULAR; INTRAVENOUS AS NEEDED
Status: DISCONTINUED | OUTPATIENT
Start: 2019-03-18 | End: 2019-03-18 | Stop reason: SURG

## 2019-03-18 RX ORDER — FENTANYL CITRATE/PF 50 MCG/ML
25 SYRINGE (ML) INJECTION
Status: DISCONTINUED | OUTPATIENT
Start: 2019-03-18 | End: 2019-03-18 | Stop reason: HOSPADM

## 2019-03-18 RX ORDER — HYDROMORPHONE HYDROCHLORIDE 2 MG/ML
INJECTION, SOLUTION INTRAMUSCULAR; INTRAVENOUS; SUBCUTANEOUS AS NEEDED
Status: DISCONTINUED | OUTPATIENT
Start: 2019-03-18 | End: 2019-03-18 | Stop reason: SURG

## 2019-03-18 RX ORDER — CEPHALEXIN 500 MG/1
500 CAPSULE ORAL EVERY 6 HOURS SCHEDULED
Qty: 12 CAPSULE | Refills: 0 | Status: SHIPPED | OUTPATIENT
Start: 2019-03-18 | End: 2019-03-19 | Stop reason: SDUPTHER

## 2019-03-18 RX ORDER — FENTANYL CITRATE 50 UG/ML
INJECTION, SOLUTION INTRAMUSCULAR; INTRAVENOUS AS NEEDED
Status: DISCONTINUED | OUTPATIENT
Start: 2019-03-18 | End: 2019-03-18 | Stop reason: SURG

## 2019-03-18 RX ORDER — NEOSTIGMINE METHYLSULFATE 1 MG/ML
INJECTION INTRAVENOUS AS NEEDED
Status: DISCONTINUED | OUTPATIENT
Start: 2019-03-18 | End: 2019-03-18 | Stop reason: SURG

## 2019-03-18 RX ORDER — FLUOXETINE HYDROCHLORIDE 20 MG/1
20 CAPSULE ORAL 2 TIMES DAILY
Status: DISCONTINUED | OUTPATIENT
Start: 2019-03-18 | End: 2019-03-18

## 2019-03-18 RX ORDER — ATORVASTATIN CALCIUM 10 MG/1
10 TABLET, FILM COATED ORAL
Status: DISCONTINUED | OUTPATIENT
Start: 2019-03-18 | End: 2019-03-19 | Stop reason: HOSPADM

## 2019-03-18 RX ORDER — MORPHINE SULFATE 10 MG/ML
2 INJECTION, SOLUTION INTRAMUSCULAR; INTRAVENOUS
Status: DISCONTINUED | OUTPATIENT
Start: 2019-03-18 | End: 2019-03-19 | Stop reason: HOSPADM

## 2019-03-18 RX ORDER — LABETALOL 20 MG/4 ML (5 MG/ML) INTRAVENOUS SYRINGE
5
Status: DISCONTINUED | OUTPATIENT
Start: 2019-03-18 | End: 2019-03-18 | Stop reason: HOSPADM

## 2019-03-18 RX ORDER — DEXAMETHASONE SODIUM PHOSPHATE 10 MG/ML
INJECTION, SOLUTION INTRAMUSCULAR; INTRAVENOUS AS NEEDED
Status: DISCONTINUED | OUTPATIENT
Start: 2019-03-18 | End: 2019-03-18 | Stop reason: SURG

## 2019-03-18 RX ORDER — SODIUM CHLORIDE, SODIUM LACTATE, POTASSIUM CHLORIDE, CALCIUM CHLORIDE 600; 310; 30; 20 MG/100ML; MG/100ML; MG/100ML; MG/100ML
125 INJECTION, SOLUTION INTRAVENOUS CONTINUOUS
Status: DISCONTINUED | OUTPATIENT
Start: 2019-03-18 | End: 2019-03-19

## 2019-03-18 RX ORDER — MAGNESIUM HYDROXIDE/ALUMINUM HYDROXICE/SIMETHICONE 120; 1200; 1200 MG/30ML; MG/30ML; MG/30ML
30 SUSPENSION ORAL EVERY 6 HOURS PRN
Status: DISCONTINUED | OUTPATIENT
Start: 2019-03-18 | End: 2019-03-19 | Stop reason: HOSPADM

## 2019-03-18 RX ORDER — ONDANSETRON 2 MG/ML
4 INJECTION INTRAMUSCULAR; INTRAVENOUS ONCE AS NEEDED
Status: DISCONTINUED | OUTPATIENT
Start: 2019-03-18 | End: 2019-03-18 | Stop reason: HOSPADM

## 2019-03-18 RX ADMIN — CEFAZOLIN SODIUM 2000 MG: 2 SOLUTION INTRAVENOUS at 07:51

## 2019-03-18 RX ADMIN — DOCUSATE SODIUM 100 MG: 100 CAPSULE, LIQUID FILLED ORAL at 19:33

## 2019-03-18 RX ADMIN — LIDOCAINE HYDROCHLORIDE ANHYDROUS 100 MG: 10 INJECTION, SOLUTION INFILTRATION at 07:46

## 2019-03-18 RX ADMIN — SODIUM CHLORIDE, SODIUM LACTATE, POTASSIUM CHLORIDE, AND CALCIUM CHLORIDE 125 ML/HR: .6; .31; .03; .02 INJECTION, SOLUTION INTRAVENOUS at 18:24

## 2019-03-18 RX ADMIN — GLYCOPYRROLATE 0.6 MG: 0.2 INJECTION, SOLUTION INTRAMUSCULAR; INTRAVENOUS at 11:21

## 2019-03-18 RX ADMIN — SODIUM CHLORIDE: 0.9 INJECTION, SOLUTION INTRAVENOUS at 07:50

## 2019-03-18 RX ADMIN — HYDROMORPHONE HYDROCHLORIDE 0.5 MG: 2 INJECTION, SOLUTION INTRAMUSCULAR; INTRAVENOUS; SUBCUTANEOUS at 09:15

## 2019-03-18 RX ADMIN — ROCURONIUM BROMIDE 10 MG: 10 INJECTION INTRAVENOUS at 09:25

## 2019-03-18 RX ADMIN — ROCURONIUM BROMIDE 50 MG: 10 INJECTION INTRAVENOUS at 07:46

## 2019-03-18 RX ADMIN — FENTANYL CITRATE 100 MCG: 50 INJECTION INTRAMUSCULAR; INTRAVENOUS at 07:46

## 2019-03-18 RX ADMIN — ONDANSETRON 4 MG: 2 INJECTION INTRAMUSCULAR; INTRAVENOUS at 10:58

## 2019-03-18 RX ADMIN — ATORVASTATIN CALCIUM 10 MG: 10 TABLET, FILM COATED ORAL at 16:37

## 2019-03-18 RX ADMIN — DEXAMETHASONE SODIUM PHOSPHATE 4 MG: 10 INJECTION, SOLUTION INTRAMUSCULAR; INTRAVENOUS at 07:53

## 2019-03-18 RX ADMIN — PROPOFOL 200 MG: 10 INJECTION, EMULSION INTRAVENOUS at 07:46

## 2019-03-18 RX ADMIN — POLYETHYLENE GLYCOL 3350 17 G: 17 POWDER, FOR SOLUTION ORAL at 16:36

## 2019-03-18 RX ADMIN — MIDAZOLAM HYDROCHLORIDE 2 MG: 1 INJECTION, SOLUTION INTRAMUSCULAR; INTRAVENOUS at 07:46

## 2019-03-18 RX ADMIN — CEFAZOLIN SODIUM 2000 MG: 2 SOLUTION INTRAVENOUS at 16:33

## 2019-03-18 RX ADMIN — ROCURONIUM BROMIDE 10 MG: 10 INJECTION INTRAVENOUS at 08:55

## 2019-03-18 RX ADMIN — NEOSTIGMINE METHYLSULFATE 3 MG: 1 INJECTION INTRAVENOUS at 11:21

## 2019-03-18 RX ADMIN — CEFAZOLIN SODIUM 2000 MG: 2 SOLUTION INTRAVENOUS at 07:21

## 2019-03-18 RX ADMIN — SODIUM CHLORIDE, SODIUM LACTATE, POTASSIUM CHLORIDE, AND CALCIUM CHLORIDE: .6; .31; .03; .02 INJECTION, SOLUTION INTRAVENOUS at 07:21

## 2019-03-18 RX ADMIN — HYDROMORPHONE HYDROCHLORIDE 0.5 MG: 2 INJECTION, SOLUTION INTRAMUSCULAR; INTRAVENOUS; SUBCUTANEOUS at 10:28

## 2019-03-18 NOTE — OP NOTE
OPERATIVE REPORT  PATIENT NAME: Bennie Vera    :  1950  MRN: [id number]  Pt Location: AN OR ROOM 04    SURGERY DATE: 3/18/2019    Surgeon(s) and Role:     * Michel Frazier MD - Primary     * Dwight Duong PA-C - Assisting    A physician's assistant was needed in this case for exposure of anatomy, and changing of the robotic instruments as well as suction and retraction    Preop Diagnosis:  Prostate cancer ([facility] [address] ) Donis Renee    Post-Op Diagnosis Codes:     * Prostate cancer ([address] ) Donis Renee    Procedure(s) (LRB):  ROBOTIC RADICAL PROSTATECTOMY; BILATERAL PELVIC LYMPH NODE DISSECTION (N/A) bilateral nerve-sparing procedure    Specimen(s):  ID Type Source Tests Collected by Time Destination   1 :  Tissue Prostate TISSUE EXAM Michel Frazier MD 3/18/2019 1112    2 : bilateral pelvic lymph node  Tissue Lymph Node TISSUE EXAM Michel Frazier MD 3/18/2019 1113    3 : Periprostatic fat Tissue Soft Tissue, Other TISSUE EXAM Michel Frazier MD 3/18/2019 7651        Estimated Blood Loss:   350 mL    Drains:  Closed/Suction Drain Left Abdomen Bulb 19 Fr  (Active)   Number of days: 0       Urethral Catheter Latex 20 Fr  (Active)   Number of days: 0       Anesthesia Type:   General    Operative Indications:  Prostate cancer ([address] ) [C61]      Operative Findings:  Uncomplicated radical prostatectomy and bilateral pelvic lymph node dissection, a bilateral nerve spare was performed, estimated blood loss 350 milliliters  No evidence of gross extension on gross anatomy during today's dissection, lymph nodes were also grossly visibly negative    Complications:   None    Procedure and Technique: On an outpatient basis Ede Dinero was diagnosed with intermediate risk prostate cancer  He desired surgical therapy and the risks and benefits of robot assisted laparoscopic radical prostatectomy were discussed with him  These are outlined in our preoperative note as well as in the informed consent that he signed      After being evaluated by the perioperative nursing and anesthesia staff he was brought to operating 4  For the induction of general endotracheal anesthesia  Prophylactic antibiosis was administered as per the guidelines  Adequate intravenous access was obtained, and the pink pad positioning system was used for positioning in the low lithotomy position  He was then prepped and draped in the usual sterile fashion after which a full time-out confirmed the proper patient position and procedure  We then turned our attention to establishment of pneumoperitoneum with use of the Veress needle, a drop test was employed, and 15 millimeters of mercury was established within the peritoneum  A camera was then placed and there was no damage to the viscera or vessels of the abdomen or other organs  We then placed ports as is customary with 8 millimeter robotic ports x3 as well as a 12 millimeter assistant port, and a 5 millimeter assistant port, in addition to the 12 millimeter camera port  These were placed in a line across the lower abdomen try angulate inguinal the pelvis  In the right hand was placed a monopolar scissor, in the left hand a Ferdinand Canales bipolar dissect or, and a Pro grasp in the 3rd arm  We turned our attention to division of some small adhesions of the sigmoid colon to the pelvic sidewall, we then made a posterior incision over the peritoneum overlying the posterior vasa deferentia and seminal vesicles  These structures were dissected free using minimal cautery, and bipolar cautery when necessary to preserve the neurovascular bundles laterally  The plan of an obvious fascia was then identified and dissected from the base to the apex the prostate posteriorly  We then dropped the bladder down by dividing the medial umbilical ligaments as well as the urachus, the area lower tissue between the bladder and the posterior surface of the rectus muscles was developed bluntly and sharply using minimal electrocautery    We then turned our attention to opening the endopelvic fascia again with no use of electrocautery  The puboprostatic ligaments were taken and the pillar levator muscle was then dissected free  We then identified the bladder neck and this was preserved, the patient was seen to have a small median lobe and inappropriate cleavage plane behind the median lobe was performed  We then made an intentional hole through then obvious fascia posteriorly and deliver the previously dissected structures of the vas deferens and seminal vesicles through this hole  The right vascular pedicle was then identified after performing a complete nerve spare on the right without use of electrocautery and the vessel sealer device was then used to divide the pedicle while keeping the neurovascular bundle lateral to this to avoid thermal spread  This maneuver was repeated on the patient's left-hand side taking the left vascular pedicle to the prostate and perform a complete nerve-sparing procedure with use of clips and no use of electrocautery  We then came through the dorsal venous complex without the use of heat, small bleeders were noted and these were tied off in a running fashion with a 2 0 Vicryl on SA channel  We then came through the anterior urethra after identifying the apex of the prostate, and then to the posterior urethra after appropriate dissection of the rectal urethralis muscle  Small, film like attachments between Denonvilliers fashion a posterior prostate were then taken in a step and push fashion  The prostate was then placed into a bag for later retrieval     Surgicel hemostatic matrix was then placed in the pelvis well we performed a bilateral pelvic lymph node dissection  In this procedure we removed all fibrofatty tissue with use of clips distally between the external iliac vessels, the obturator vessels, and the bladder medially    The  nerve and vessels were not damaged during this dissection on the right or the left  At this point estimated blood loss was around 350 milliliters  We then turned our attention to performance of a vesicourethral anastomosis with barbed suture bilaterally  Prior to and after posterior plate closure bilateral ureteral jets were noted with clear yellow urine  The anterior bladder neck was then closed carefully taking care not to incorporate the neurovascular bundles or the dorsal venous complex toward closure  A final catheter was then placed (20 [place] tip)  And the bladder was irrigated and was seen to be water fast     A drain was placed through the left lower quadrant 3rd robotic arm port, and Surgiflo hemostatic matrix was then placed after removal of the previously placed Surgicel  The robot was undocked, all instrument counts and sponge counts were correct  We then closed the fascia using finger sweeps to ensure that no visceral components were incorporated with use of 0 Vicryl, figure-of-eight sutures  The subcutaneous tissues were approximated with Vicryl and the skin was closed with 4 0 Monocryl  Local anesthesia was infiltrated for postoperative comfort  The patient was awakened from general anesthesia and taken to the PACU without issue, event, or complication         I was present for the entire procedure, A qualified resident physician was not available and A physician assistant was required during the procedure for retraction tissue handling,dissection and suturing    Patient Disposition:  PACU     SIGNATURE: Ramona Wilder MD  DATE: March 18, 2019  TIME: 11:25 AM

## 2019-03-18 NOTE — H&P
H&P Exam - Urology   Iris Pickett 80 y o  male MRN: [id number]  Unit/Bed#: OR [place] Encounter: [id number]    Assessment/Plan     Assessment:  50-year-old gentleman with intermediate risk prostate cancer for robot assisted laparoscopic radical prostatectomy and bilateral pelvic lymph node dissection  He has been appropriately prepared for surgery and informed consent was verified  He does not require marking  Plan:  Proceed to the operating room for robot assisted laparoscopic radical prostatectomy and bilateral pelvic lymph node dissection    History of Present Illness   HPI:  Iris Pickett is a 80 y o  male who presents with intermediate risk prostate cancer, he has considered all of his options and wishes to proceed with radical prostatectomy and bilateral pelvic lymph node dissection  He has been appropriately prepared for surgery and all of his questions and concerns were answered and addressed       Review of Systems   Constitutional: Negative  HENT: Negative  Eyes: Negative  Respiratory: Negative  Cardiovascular: Negative  Gastrointestinal: Negative  Endocrine: Negative  Genitourinary: Negative  Musculoskeletal: Negative  Skin: Negative  Allergic/Immunologic: Negative  Neurological: Negative  Hematological: Negative  Psychiatric/Behavioral: Negative          Historical Information   Past Medical History:   Diagnosis Date    Arthritis     Cancer ([address] )     prostate    Hyperlipidemia     Hypertension     OCD (obsessive compulsive disorder)      Past Surgical History:   Procedure Laterality Date    FINGER AMPUTATION Left     partial middle and ring    KNEE SURGERY Right     menisectomy     Social History   Social History     Substance and Sexual Activity   Alcohol Use No     Social History     Substance and Sexual Activity   Drug Use No     Social History     Tobacco Use   Smoking Status Never Smoker   Smokeless Tobacco Never Used     Family History: History reviewed  No pertinent family history  Meds/Allergies   PTA meds:   Prior to Admission Medications   Prescriptions Last Dose Informant Patient Reported? Taking? FLUoxetine (PROzac) 20 mg capsule   No Yes   Sig: Take 1 capsule (20 mg total) by mouth 2 (two) times a day for 90 days   atorvastatin (LIPITOR) 10 mg tablet   No Yes   Sig: Take 1 tablet (10 mg total) by mouth daily for 90 days   co-enzyme Q-10 30 MG capsule   Yes Yes   Sig: Take 200 mg by mouth daily   fluocinonide (LIDEX) 0 05 % cream   No No   Sig: Apply topically 2 (two) times a day for 7 days   lisinopril (ZESTRIL) 10 mg tablet   No Yes   Sig: Take 1 tablet (10 mg total) by mouth daily for 90 days      Facility-Administered Medications: None     No Known Allergies    Objective   Vitals: Blood pressure 161/92, pulse 75, temperature (!) 97 3 °F (36 3 °C), temperature source Temporal, resp  rate 18, height 5' 9" (1 753 m), weight 84 8 kg (187 lb), SpO2 96 %  No intake/output data recorded  Invasive Devices     Peripheral Intravenous Line            Peripheral IV 07/31/17 Right Antecubital 594 days                Physical Exam   Constitutional: He is oriented to person, place, and time  He appears well-developed and well-nourished  No distress  HENT:   Head: Normocephalic and atraumatic  Right Ear: External ear normal    Left Ear: External ear normal    Nose: Nose normal    Eyes: Pupils are equal, round, and reactive to light  Conjunctivae and EOM are normal  Right eye exhibits no discharge  Left eye exhibits no discharge  No scleral icterus  Neck: Normal range of motion  Neck supple  No tracheal deviation present  No thyromegaly present  Cardiovascular: Regular rhythm and intact distal pulses  Pulmonary/Chest: Effort normal  No stridor  No respiratory distress  Abdominal: Soft  He exhibits no distension and no mass  There is no tenderness  There is no rebound and no guarding  No hernia     Genitourinary:   Genitourinary Comments: Deferred for the operating room   Musculoskeletal: He exhibits no edema, tenderness or deformity  Neurological: He is alert and oriented to person, place, and time  No cranial nerve deficit  Coordination normal    Skin: Skin is warm and dry  No rash noted  He is not diaphoretic  No erythema  No pallor  Psychiatric: He has a normal mood and affect  His behavior is normal  Judgment and thought content normal    Nursing note and vitals reviewed  Lab Results: I have personally reviewed pertinent reports  Imaging: I have personally reviewed pertinent reports  EKG, Pathology, and Other Studies: I have personally reviewed pertinent reports  VTE Prophylaxis: Sequential compression device [place] Sella)     Code Status: No Order  Advance Directive and Living Will:      Power of :    POLST:      Counseling / Coordination of Care  Total floor / unit time spent today 15 minutes  Greater than 50% of total time was spent with the patient and / or family counseling and / or coordination of care  A description of the counseling / coordination of care:  A review of the pre, kevin, and postoperative care for this procedure

## 2019-03-18 NOTE — ANESTHESIA PREPROCEDURE EVALUATION
Review of Systems/Medical History  Patient summary reviewed        Cardiovascular  Negative cardio ROS Exercise tolerance (METS): >4,  Hyperlipidemia, Hypertension ,    Pulmonary  Negative pulmonary ROS        GI/Hepatic  Negative GI/hepatic ROS          Negative  ROS Prostatic disorder, history of prostate cancer       Endo/Other  Negative endo/other ROS      GYN  Negative gynecology ROS          Hematology  Negative hematology ROS      Musculoskeletal  Negative musculoskeletal ROS   Arthritis     Neurology  Negative neurology ROS      Psychology   Negative psychology ROS              Physical Exam    Airway    Mallampati score: II  TM Distance: >3 FB  Neck ROM: full     Dental   No notable dental hx     Cardiovascular  Comment: Negative ROS,     Pulmonary      Other Findings        Anesthesia Plan  ASA Score- 2     Anesthesia Type- general with ASA Monitors  Additional Monitors:   Airway Plan: ETT  Comment: Patient seen and examined  History reviewed  Patient to be done under general anesthesia with ETT and routine monitors  Risks discussed with the patient  Consent obtained        Plan Factors-    Induction- intravenous  Postoperative Plan-     Informed Consent- Anesthetic plan and risks discussed with patient  I personally reviewed this patient with the CRNA  Discussed and agreed on the Anesthesia Plan with the ROCIO Camacho

## 2019-03-18 NOTE — ANESTHESIA POSTPROCEDURE EVALUATION
Post-Op Assessment Note    CV Status:  Stable    Pain management: adequate     Mental Status:  Alert and awake   Hydration Status:  Euvolemic   PONV Controlled:  Controlled   Airway Patency:  Patent   Post Op Vitals Reviewed: Yes      Staff: CRNA   Comments: vss report rn          BP      Temp     Pulse     Resp      SpO2

## 2019-03-18 NOTE — DISCHARGE INSTRUCTIONS
Robot Assisted Laparoscopic Prostatectomy   WHAT YOU NEED TO KNOW:     Angel,  Today you had a robot assisted laparoscopic radical prostatectomy and bilateral pelvic lymph node dissection  There were no major complications during today's operation and we lost about 350 milliliters of blood, about 3 and half [place] cups  I was able to do a bilateral nerve spare, so I am hopeful regarding your sexual function going forward  Your prostate had no signs of evidence of gross extension, however the microscopic examination is where the money is  After your discharge to home, you will return for a Mendiola catheter removal in approximately 1 week, Uro drain will likely be removed prior to your discharge home, and he will see me in approximately 2 weeks to review your pathology and discuss further planning in terms of PSA follow-up and additional therapies as warranted based on your pathology  It is normal to have blood in the urine, the sensation that you need to urinate with some leakage around the catheter from time to time (these are called bladder spasms), as well as urgency and frequency with your catheter in place  These will get better as soon as her catheter is removed  If you have questions or concerns in the postoperative time frame, please do not hesitate to contact my office  Take care and be well,  Dr Cameron Quiroz    Robot-assisted laparoscopic prostatectomy (RALP) is surgery to remove your prostate gland through small incisions in your abdomen  RALP is done with a machine that is controlled by your surgeon  The machine has mechanical arms that use small tools to remove your prostate  DISCHARGE INSTRUCTIONS:   Medicines:   · Pain medicine: You may be given a prescription medicine to decrease pain  Do not wait until the pain is severe before you take this medicine  · Stool softeners: This medicine makes it easier for you to have a bowel movement   You may need this medicine to treat or prevent constipation  · Antibiotics: This medicine is given to fight or prevent an infection caused by bacteria  Always take your antibiotics exactly as ordered by your healthcare provider  Do not stop taking your medicine unless directed by your healthcare provider  Never save antibiotics or take leftover antibiotics that were given to you for another illness  · Take your medicine as directed  Contact your healthcare provider if you think your medicine is not helping or if you have side effects  Tell him or her if you are allergic to any medicine  Keep a list of the medicines, vitamins, and herbs you take  Include the amounts, and when and why you take them  Bring the list or the pill bottles to follow-up visits  Carry your medicine list with you in case of an emergency  Follow up with your healthcare provider or uro-oncologist in 1 week or as directed: You will need your urinary catheter removed  Tests will show if any cancer remains in your body after surgery  Write down your questions so you remember to ask them during your visits  A Mendiola catheter  is a tube put into your bladder to drain urine into a bag  Keep the bag below your waist  This will prevent urine from flowing back into your bladder and causing an infection or other problems  Also, keep the tube free of kinks so the urine will drain properly  Do not pull on the catheter  This can cause pain and bleeding, and may cause the catheter to come out  Wound care: Follow your healthcare provider's directions on how to care for your incisions  Ask when you can start showering or bathing  You will need to keep your stitches covered so they do not get wet  What to expect after surgery:   · Activities: You may feel like resting more after surgery  Slowly start to do more each day  Rest when you feel it is needed  ¨ Normal daily activities:  Ask your healthcare provider when it is safe to return to your normal daily activities   You may need to wait 4 to 6 weeks after surgery  Your healthcare provider will tell you about the activities you should avoid after your surgery  These may include driving while you are taking pain medicines or until your catheter is removed  You may also be given a weight restriction on lifting objects  ¨ Walking and other exercise:  Walking is an important activity to help with your recovery after surgery  Walking is a good way to improve your overall health and help you recover sooner  It also helps keep your blood flowing and reduces the risk of blood clots  Other types of exercise can also be an important part of your recovery  Ask about the exercises that are safe for you  ¨ Sexual activity:  Ask when it is safe to start having sexual intercourse again  You may have trouble having an erection  Your erections may return with time  Medicines and mechanical aids may help  Talk to your healthcare provider about these options  · Bladder control:  After surgery, you may have problems controlling when you urinate  Ask your healthcare provider about pads and liners that absorb urine while you recover  · Constipation:  You may have problems having bowel movements during your recovery  Ask your healthcare provider about diet changes if you are having problems with constipation  Contact your healthcare provider or uro-oncologist if:   · You have a fever  · Your pain is getting worse, even with medicine  · You have an incision that is red, swollen, or has pus coming from it  · You are urinating less than usual      · You have trouble urinating or having a bowel movement  · You have questions or concerns about your condition or care  Seek care immediately or call 911 if:   · You have more blood in your urine than you were told to expect, or you pass a blood clot  · You have an upset stomach or are vomiting  · You have painful swelling in your abdomen that does not go away      · You suddenly feel lightheaded and short of breath  · You have chest pain when you take a deep breath or cough  You cough up blood  · Your arm or leg feels warm, tender, and painful  It may look swollen and red  © 2017 [employer] Information is for End User's use only and may not be sold, redistributed or otherwise used for commercial purposes  All illustrations and images included in CareNotes® are the copyrighted property of A D [employer] M , Inc  or [employer]  The above information is an  only  It is not intended as medical advice for individual conditions or treatments  Talk to your doctor, nurse or pharmacist before following any medical regimen to see if it is safe and effective for you

## 2019-03-18 NOTE — TELEPHONE ENCOUNTER
Kylie File is status post radical prostatectomy and bilateral pelvic lymph node dissection, needs a nursing visit for Mendiola removal and trial of void in 1 week, to be followed by a visit with me in 2 weeks for a postoperative visit and review of pathology

## 2019-03-19 VITALS
TEMPERATURE: 99.4 F | DIASTOLIC BLOOD PRESSURE: 68 MMHG | RESPIRATION RATE: 18 BRPM | SYSTOLIC BLOOD PRESSURE: 128 MMHG | WEIGHT: 187 LBS | HEIGHT: 69 IN | OXYGEN SATURATION: 95 % | HEART RATE: 67 BPM | BODY MASS INDEX: 27.7 KG/M2

## 2019-03-19 LAB
ANION GAP SERPL CALCULATED.3IONS-SCNC: 7 MMOL/L (ref 4–13)
BUN SERPL-MCNC: 10 MG/DL (ref 5–25)
CALCIUM SERPL-MCNC: 8 MG/DL (ref 8.3–10.1)
CHLORIDE SERPL-SCNC: 108 MMOL/L (ref 100–108)
CO2 SERPL-SCNC: 27 MMOL/L (ref 21–32)
CREAT SERPL-MCNC: 1.13 MG/DL (ref 0.6–1.3)
ERYTHROCYTE [DISTWIDTH] IN BLOOD BY AUTOMATED COUNT: 12.2 % (ref 11.6–15.1)
GFR SERPL CREATININE-BSD FRML MDRD: 67 ML/MIN/1.73SQ M
GLUCOSE SERPL-MCNC: 86 MG/DL (ref 65–140)
HCT VFR BLD AUTO: 33 % (ref 36.5–49.3)
HGB BLD-MCNC: 10.9 G/DL (ref 12–17)
MCH RBC QN AUTO: 29.6 PG (ref 26.8–34.3)
MCHC RBC AUTO-ENTMCNC: 33 G/DL (ref 31.4–37.4)
MCV RBC AUTO: 90 FL (ref 82–98)
PLATELET # BLD AUTO: 215 THOUSANDS/UL (ref 149–390)
PMV BLD AUTO: 9.3 FL (ref 8.9–12.7)
POTASSIUM SERPL-SCNC: 3.7 MMOL/L (ref 3.5–5.3)
RBC # BLD AUTO: 3.68 MILLION/UL (ref 3.88–5.62)
SODIUM SERPL-SCNC: 142 MMOL/L (ref 136–145)
WBC # BLD AUTO: 7.8 THOUSAND/UL (ref 4.31–10.16)

## 2019-03-19 PROCEDURE — 80048 BASIC METABOLIC PNL TOTAL CA: CPT | Performed by: UROLOGY

## 2019-03-19 PROCEDURE — 85027 COMPLETE CBC AUTOMATED: CPT | Performed by: UROLOGY

## 2019-03-19 PROCEDURE — 99024 POSTOP FOLLOW-UP VISIT: CPT | Performed by: UROLOGY

## 2019-03-19 RX ORDER — CEPHALEXIN 500 MG/1
500 CAPSULE ORAL EVERY 6 HOURS SCHEDULED
Qty: 12 CAPSULE | Refills: 0 | Status: SHIPPED | OUTPATIENT
Start: 2019-03-19 | End: 2019-03-22

## 2019-03-19 RX ORDER — OXYCODONE HYDROCHLORIDE AND ACETAMINOPHEN 5; 325 MG/1; MG/1
1 TABLET ORAL EVERY 6 HOURS PRN
Qty: 12 TABLET | Refills: 0 | Status: SHIPPED | OUTPATIENT
Start: 2019-03-19 | End: 2019-03-24

## 2019-03-19 RX ADMIN — ENOXAPARIN SODIUM 40 MG: 40 INJECTION SUBCUTANEOUS at 08:52

## 2019-03-19 RX ADMIN — FLUOXETINE HYDROCHLORIDE 40 MG: 20 CAPSULE ORAL at 09:20

## 2019-03-19 RX ADMIN — DOCUSATE SODIUM 100 MG: 100 CAPSULE, LIQUID FILLED ORAL at 09:20

## 2019-03-19 RX ADMIN — CEFAZOLIN SODIUM 2000 MG: 2 SOLUTION INTRAVENOUS at 09:19

## 2019-03-19 RX ADMIN — POLYETHYLENE GLYCOL 3350 17 G: 17 POWDER, FOR SOLUTION ORAL at 09:20

## 2019-03-19 RX ADMIN — CEFAZOLIN SODIUM 2000 MG: 2 SOLUTION INTRAVENOUS at 00:12

## 2019-03-19 RX ADMIN — SODIUM CHLORIDE, SODIUM LACTATE, POTASSIUM CHLORIDE, AND CALCIUM CHLORIDE 125 ML/HR: .6; .31; .03; .02 INJECTION, SOLUTION INTRAVENOUS at 02:44

## 2019-03-19 NOTE — PLAN OF CARE
Problem: PAIN - ADULT  Goal: Verbalizes/displays adequate comfort level or baseline comfort level  Description  Interventions:  - Encourage patient to monitor pain and request assistance  - Assess pain using appropriate pain scale  - Administer analgesics based on type and severity of pain and evaluate response  - Implement non-pharmacological measures as appropriate and evaluate response  - Consider cultural and social influences on pain and pain management  - Notify physician/advanced practitioner if interventions unsuccessful or patient reports new pain  Outcome: Progressing     Problem: INFECTION - ADULT  Goal: Absence or prevention of progression during hospitalization  Description  INTERVENTIONS:  - Assess and monitor for signs and symptoms of infection  - Monitor lab/diagnostic results  - Monitor all insertion sites, i e  indwelling lines, tubes, and drains  - Monitor endotracheal (as able) and nasal secretions for changes in amount and color  - [place] appropriate cooling/warming therapies per order  - Administer medications as ordered  - Instruct and encourage patient and family to use good hand hygiene technique  - Identify and instruct in appropriate isolation precautions for identified infection/condition  Outcome: Progressing  Goal: Absence of fever/infection during neutropenic period  Description  INTERVENTIONS:  - Monitor WBC  - Implement neutropenic guidelines  Outcome: Progressing     Problem: SAFETY ADULT  Goal: Patient will remain free of falls  Description  INTERVENTIONS:  - Assess patient frequently for physical needs  -  Identify cognitive and physical deficits and behaviors that affect risk of falls    -  [place] fall precautions as indicated by assessment   - Educate patient/family on patient safety including physical limitations  - Instruct patient to call for assistance with activity based on assessment  - Modify environment to reduce risk of injury  - Consider OT/PT consult to assist with strengthening/mobility  Outcome: Progressing  Goal: Maintain or return to baseline ADL function  Description  INTERVENTIONS:  -  Assess patient's ability to carry out ADLs; assess patient's baseline for ADL function and identify physical deficits which impact ability to perform ADLs (bathing, care of mouth/teeth, toileting, grooming, dressing, etc )  - Assess/evaluate cause of self-care deficits   - Assess range of motion  - Assess patient's mobility; develop plan if impaired  - Assess patient's need for assistive devices and provide as appropriate  - Encourage maximum independence but intervene and supervise when necessary  ¯ Involve family in performance of ADLs  ¯ Assess for home care needs following discharge   ¯ Request OT consult to assist with ADL evaluation and planning for discharge  ¯ Provide patient education as appropriate  Outcome: Progressing  Goal: Maintain or return mobility status to optimal level  Description  INTERVENTIONS:  - Assess patient's baseline mobility status (ambulation, transfers, stairs, etc )    - Identify cognitive and physical deficits and behaviors that affect mobility  - Identify mobility aids required to assist with transfers and/or ambulation (gait belt, sit-to-stand, lift, walker, cane, etc )  - [place] fall precautions as indicated by assessment  - Record patient progress and toleration of activity level on Mobility SBAR; progress patient to next Phase/Stage  - Instruct patient to call for assistance with activity based on assessment  - Request Rehabilitation consult to assist with strengthening/weightbearing, etc   Outcome: Progressing     Problem: DISCHARGE PLANNING  Goal: Discharge to home or other facility with appropriate resources  Description  INTERVENTIONS:  - Identify barriers to discharge w/patient and caregiver  - Arrange for needed discharge resources and transportation as appropriate  - Identify discharge learning needs (meds, wound care, etc )  - Arrange for interpretive services to assist at discharge as needed  - Refer to Case Management Department for coordinating discharge planning if the patient needs post-hospital services based on physician/advanced practitioner order or complex needs related to functional status, cognitive ability, or social support system  Outcome: Progressing     Problem: Knowledge Deficit  Goal: Patient/family/caregiver demonstrates understanding of disease process, treatment plan, medications, and discharge instructions  Description  Complete learning assessment and assess knowledge base    Interventions:  - Provide teaching at level of understanding  - Provide teaching via preferred learning methods  Outcome: Progressing

## 2019-03-19 NOTE — PROGRESS NOTES
UROLOGY PROGRESS NOTE   Patient Identifiers: Radha Culver (MRN [id number])  Date of Service: 3/19/2019        Assessment:   Postoperative day 1  Status post laparoscopic radical prostatectomy and bilateral pelvic lymph node dissection, doing well, progressing as expected     Plan:     Advance diet    Ambulate and mobilize    Hypocalcemia - dilutional, require no treatment, asymptomatic    Acute blood loss anemia - due to surgical blood loss and dilutional effect, patient is asymptomatic, as such no treatment is required at this time  I will check on him again later today, he will likely be discharged home with drain removal prior to discharge    Subjective:     24 HR EVENTS:   no significant events  Patient has  no complaints        Objective:     VITALS:    Vitals:    03/19/19 0430   BP: 99/58   Pulse: 67   Resp: 18   Temp: 98 °F (36 7 °C)   SpO2: 96%       INS & OUTS:    Reviewed pertinent values I's/O's      LABS:  Lab Results   Component Value Date    HGB 10 9 (L) 03/19/2019    HCT 33 0 (L) 03/19/2019    WBC 7 80 03/19/2019     03/19/2019   ]    Lab Results   Component Value Date    K 3 7 03/19/2019     03/19/2019    CO2 27 03/19/2019    BUN 10 03/19/2019    CREATININE 1 13 03/19/2019    CALCIUM 8 0 (L) 03/19/2019   ]    INPATIENT MEDS:    Current Facility-Administered Medications:     acetaminophen (TYLENOL) tablet 650 mg, 650 mg, Oral, Q4H PRN, Juan Lim MD    aluminum-magnesium hydroxide-simethicone (MYLANTA) 200-200-20 mg/5 mL oral suspension 30 mL, 30 mL, Oral, Q6H PRN, Juan Lim MD    atorvastatin (LIPITOR) tablet 10 mg, 10 mg, Oral, Daily With Leana Jennings MD, 10 mg at 03/18/19 1637    ceFAZolin (ANCEF) IVPB (premix) 2,000 mg, 2,000 mg, Intravenous, Q8H, Juan Lim MD, Last Rate: 100 mL/hr at 03/19/19 0012, 2,000 mg at 03/19/19 0012    cyclobenzaprine (FLEXERIL) tablet 5 mg, 5 mg, Oral, TID, Juan Lim MD    diphenhydrAMINE (BENADRYL) tablet 25 mg, 25 mg, Oral, Q6H PRN, Tess Mackay MD    docusate sodium (COLACE) capsule 100 mg, 100 mg, Oral, BID, Tess Mackya MD, 100 mg at 03/18/19 1933    enoxaparin (LOVENOX) subcutaneous injection 40 mg, 40 mg, Subcutaneous, Daily, Tess Mackay MD    FLUoxetine (PROzac) capsule 40 mg, 40 mg, Oral, Daily, OTILIA Hopkins    hydrALAZINE (APRESOLINE) injection 5 mg, 5 mg, Intravenous, Q6H PRN, Tess Mackay MD    morphine (PF) 10 mg/mL injection 2 mg, 2 mg, Intravenous, Q1H PRN, Tess Mackay MD    naloxone (NARCAN) 0 04 mg/mL syringe 0 04 mg, 0 04 mg, Intravenous, Q1MIN PRN, Tess Mackay MD    ondansetron [facility]) injection 4 mg, 4 mg, Intravenous, Q6H PRN, Tess Mackay MD    oxyCODONE (ROXICODONE) IR tablet 10 mg, 10 mg, Oral, Q4H PRN, Tess Mackay MD    oxyCODONE (ROXICODONE) IR tablet 5 mg, 5 mg, Oral, Q4H PRN, Tess Mackay MD    polyethylene glycol [facility]) packet 17 g, 17 g, Oral, Daily, Tess Mackay MD, 17 g at 03/18/19 1636      Physical Exam:   BP 99/58 (BP Location: Left arm)   Pulse 67   Temp 98 °F (36 7 °C) (Oral)   Resp 18   Ht 5' 9" (1 753 m)   Wt 84 8 kg (187 lb)   SpO2 96%   BMI 27 62 kg/m²   GEN: alert and oriented x 3    RESP: breathing comfortably with no accessory muscle use    CARDIAC: peripheral pulses present and regular rate and rhythm    ABD: soft, appropriately tender to palpation, non-distended   INCISION: clean, dry, intact   EXT: no significant peripheral edema   DRAINS: serosanguineous  NEURO: cranial nerves 2-12 intact, no sensory deficits, no motor deficits and the remainder of the neurological examination is unremarkable   PSYCHIATRIC: normal mood and affect and normal train of thought    GENITOURINARY:circumcised and no bladder tenderness    scrotum normal and without induration/swelling/cellulitis and perineum clean and without crepitus      KUHN: in place draining clear yellow urine  no clots      RADIOLOGY:   No new films for my review

## 2019-03-19 NOTE — UTILIZATION REVIEW
Initial Clinical Review  ELECTIVE INPATIENT SURGERY,  [id number] [facility]#[id number] 3/18/2019-03/22/2019  Age/Sex: 80 y o  male  Surgery Date: 3/18/19    Procedure: ROBOTIC RADICAL PROSTATECTOMY; BILATERAL PELVIC LYMPH NODE DISSECTION (N/A) bilateral nerve-sparing procedure  Anesthesia: general  Operative Findings:Uncomplicated radical prostatectomy and bilateral pelvic lymph node dissection, a bilateral nerve spare was performed, estimated blood loss 350 milliliters  No evidence of gross extension on gross anatomy during today's dissection, lymph nodes were also grossly visibly negative      Admission Orders: Date/Time/Statement: inpatient 3/18/19 @ 1625   Orders Placed This Encounter   Procedures    Inpatient Admission     Standing Status:   Standing     Number of Occurrences:   1     Order Specific Question:   Admitting Physician     Answer:   Rashard Jordan [[id number]]     Order Specific Question:   Level of Care     Answer:   Med Surg [[id number]]     Order Specific Question:   Estimated length of stay     Answer:   More than 2 Midnights     Order Specific Question:   Certification     Answer:   I certify that inpatient services are medically necessary for this patient for a duration of greater than two midnights  See H&P and MD Progress Notes for additional information about the patient's course of treatment       Vital Signs: /68 (BP Location: Left arm)   Pulse 67   Temp 99 4 °F (37 4 °C) (Oral)   Resp 18   Ht 5' 9" (1 753 m)   Wt 84 8 kg (187 lb)   SpO2 95%   BMI 27 62 kg/m²   Diet:        Diet Orders   (From admission, onward)            Start     Ordered    03/19/19 0700  Diet Regular; Regular House  Diet effective 1000     Question Answer Comment   Diet Type Regular    Regular Regular House        03/19/19 0659    03/18/19 1914  Room Service  Once     Question:  Type of Service  Answer:  Room Service - Appropriate with Assistance    03/18/19 1913        Mobility: may shower,ambulate tid  DVT Prophylaxis: scd's, SQ lovenox daily  Pain Control:   Pain Medications             oxyCODONE-acetaminophen (PERCOCET) 5-325 mg per tablet Take 1 tablet by mouth every 6 (six) hours as needed for moderate pain for up to 5 daysMax Daily Amount: 4 tablets        Network Utilization Review Department  Phone: [phone]; Fax [phone]  [email or web address]  ATTENTION: Please call with any questions or concerns to [phone]  and carefully listen to the prompts so that you are directed to the right person  Send all requests for admission clinical reviews, approved or denied determinations and any other requests to fax [phone]   All voicemails are confidential

## 2019-03-20 ENCOUNTER — TRANSITIONAL CARE MANAGEMENT (OUTPATIENT)
Dept: FAMILY MEDICINE CLINIC | Facility: CLINIC | Age: 69
End: 2019-03-20

## 2019-03-20 NOTE — PROGRESS NOTES
Pt called back and said that at this time he was following up with his urologist and didn't need us at this time

## 2019-03-21 ENCOUNTER — TELEPHONE (OUTPATIENT)
Dept: UROLOGY | Facility: AMBULATORY SURGERY CENTER | Age: 69
End: 2019-03-21

## 2019-03-21 NOTE — TELEPHONE ENCOUNTER
Bridger Caldwell is S/P RALP with [facility] on 3/18/19 with Dr Shoshana Andino  Called patient  L/M [phone] to call  Gave my direct extension

## 2019-03-21 NOTE — TELEPHONE ENCOUNTER
Angel called back  He is feeling well  He is having BMs and was instructed to continue stool softeners x 6 weeks to avoid straining with BMs  P/O restrictions reviewed again with patient  He is aware of date, time and location of all pending appointments  He is doing OK with Mendiola care and was instructed to apply neosporin to the urethral meatus and wear leg bag above the knee to keep Mendiola off tension  He does not have any other questions  He was given my direct extension to call with questions

## 2019-03-22 LAB
ABO GROUP BLD BPU: NORMAL
ABO GROUP BLD BPU: NORMAL
BPU ID: NORMAL
BPU ID: NORMAL
CROSSMATCH: NORMAL
CROSSMATCH: NORMAL
UNIT DISPENSE STATUS: NORMAL
UNIT DISPENSE STATUS: NORMAL
UNIT PRODUCT CODE: NORMAL
UNIT PRODUCT CODE: NORMAL
UNIT RH: NORMAL
UNIT RH: NORMAL

## 2019-03-26 ENCOUNTER — PROCEDURE VISIT (OUTPATIENT)
Dept: UROLOGY | Facility: CLINIC | Age: 69
End: 2019-03-26
Payer: COMMERCIAL

## 2019-03-26 VITALS
HEIGHT: 69 IN | WEIGHT: 185 LBS | SYSTOLIC BLOOD PRESSURE: 110 MMHG | BODY MASS INDEX: 27.4 KG/M2 | HEART RATE: 68 BPM | DIASTOLIC BLOOD PRESSURE: 78 MMHG

## 2019-03-26 DIAGNOSIS — N40.1 BENIGN PROSTATIC HYPERPLASIA WITH LOWER URINARY TRACT SYMPTOMS, SYMPTOM DETAILS UNSPECIFIED: Primary | ICD-10-CM

## 2019-03-26 LAB — POST-VOID RESIDUAL VOLUME, ML POC: 13 ML

## 2019-03-26 PROCEDURE — 51798 US URINE CAPACITY MEASURE: CPT

## 2019-03-26 NOTE — PROGRESS NOTES
3/26/2019    Jon Bankso  1950  [id number]    Diagnosis  Chief Complaint     Prostate Cancer          Patient presents for void trial managed by Juancarlos Tarango s/p robotic radical prostatectomy on 3/18/19    Plan  Follow up with Juancarlos Tarango as previously scheduled on 4/4/19  Procedure Mendiola removal/voiding trial    Mendiola catheter removed after deflation of an intact balloon  Patient tolerated well  Encouraged patient to hydrate well and return this afternoon for post void residual   he knows he may return early if uncomfortable and unable to urinate  Patient agrees to this plan  Patient returned this afternoon  Patient states able to void  Patient voided while in office  Bladder ultrasound performed and PVR measured 13ml      Recent Results (from the past 1 hour(s))   POCT Measure PVR    Collection Time: 03/26/19  2:16 PM   Result Value Ref Range    POST-VOID RESIDUAL VOLUME, ML POC 13 mL         Vitals:    03/26/19 0827   BP: 110/78   BP Location: Right arm   Patient Position: Sitting   Cuff Size: Standard   Pulse: 68   Weight: 83 9 kg (185 lb)   Height: 5' 9" (1 753 m)           Matthew Katz RN

## 2019-04-04 ENCOUNTER — OFFICE VISIT (OUTPATIENT)
Dept: UROLOGY | Facility: CLINIC | Age: 69
End: 2019-04-04

## 2019-04-04 VITALS
WEIGHT: 186 LBS | HEIGHT: 69 IN | BODY MASS INDEX: 27.55 KG/M2 | SYSTOLIC BLOOD PRESSURE: 122 MMHG | DIASTOLIC BLOOD PRESSURE: 76 MMHG | HEART RATE: 62 BPM

## 2019-04-04 DIAGNOSIS — R97.20 ELEVATED PSA: ICD-10-CM

## 2019-04-04 DIAGNOSIS — Z12.5 PROSTATE CANCER SCREENING: ICD-10-CM

## 2019-04-04 DIAGNOSIS — C61 PROSTATE CANCER (HCC): Primary | ICD-10-CM

## 2019-04-04 PROBLEM — Z01.818 PREOP EXAMINATION: Status: RESOLVED | Noted: 2019-02-27 | Resolved: 2019-04-04

## 2019-04-04 PROCEDURE — 99024 POSTOP FOLLOW-UP VISIT: CPT | Performed by: UROLOGY

## 2019-04-04 RX ORDER — SILDENAFIL CITRATE 20 MG/1
TABLET ORAL
Qty: 60 TABLET | Refills: 6 | Status: SHIPPED | OUTPATIENT
Start: 2019-04-04 | End: 2019-09-18 | Stop reason: SDUPTHER

## 2019-04-05 ENCOUNTER — TELEPHONE (OUTPATIENT)
Dept: UROLOGY | Facility: CLINIC | Age: 69
End: 2019-04-05

## 2019-04-05 ENCOUNTER — APPOINTMENT (EMERGENCY)
Dept: CT IMAGING | Facility: HOSPITAL | Age: 69
DRG: 661 | End: 2019-04-05
Payer: COMMERCIAL

## 2019-04-05 ENCOUNTER — HOSPITAL ENCOUNTER (INPATIENT)
Facility: HOSPITAL | Age: 69
LOS: 1 days | Discharge: HOME | DRG: 661 | End: 2019-04-05
Attending: EMERGENCY MEDICINE | Admitting: INTERNAL MEDICINE
Payer: COMMERCIAL

## 2019-04-05 ENCOUNTER — APPOINTMENT (INPATIENT)
Dept: RADIOLOGY | Facility: HOSPITAL | Age: 69
DRG: 661 | End: 2019-04-05
Payer: COMMERCIAL

## 2019-04-05 ENCOUNTER — ANESTHESIA EVENT (INPATIENT)
Dept: PERIOP | Facility: HOSPITAL | Age: 69
DRG: 661 | End: 2019-04-05
Payer: COMMERCIAL

## 2019-04-05 ENCOUNTER — PREP FOR PROCEDURE (OUTPATIENT)
Dept: UROLOGY | Facility: CLINIC | Age: 69
End: 2019-04-05

## 2019-04-05 ENCOUNTER — ANESTHESIA (INPATIENT)
Dept: PERIOP | Facility: HOSPITAL | Age: 69
DRG: 661 | End: 2019-04-05
Payer: COMMERCIAL

## 2019-04-05 VITALS
TEMPERATURE: 97.3 F | OXYGEN SATURATION: 97 % | HEART RATE: 66 BPM | WEIGHT: 186 LBS | BODY MASS INDEX: 27.55 KG/M2 | RESPIRATION RATE: 17 BRPM | SYSTOLIC BLOOD PRESSURE: 164 MMHG | HEIGHT: 69 IN | DIASTOLIC BLOOD PRESSURE: 87 MMHG

## 2019-04-05 DIAGNOSIS — N20.0 NEPHROLITHIASIS: Primary | ICD-10-CM

## 2019-04-05 DIAGNOSIS — N20.0 RENAL STONE: ICD-10-CM

## 2019-04-05 DIAGNOSIS — R10.9 LEFT FLANK PAIN: ICD-10-CM

## 2019-04-05 LAB
ALBUMIN SERPL BCP-MCNC: 3.8 G/DL (ref 3.5–5)
ALP SERPL-CCNC: 82 U/L (ref 46–116)
ALT SERPL W P-5'-P-CCNC: 27 U/L (ref 12–78)
ANION GAP SERPL CALCULATED.3IONS-SCNC: 12 MMOL/L (ref 4–13)
AST SERPL W P-5'-P-CCNC: 17 U/L (ref 5–45)
BACTERIA UR QL AUTO: ABNORMAL /HPF
BASOPHILS # BLD AUTO: 0.05 THOUSANDS/ΜL (ref 0–0.1)
BASOPHILS NFR BLD AUTO: 0 % (ref 0–1)
BILIRUB SERPL-MCNC: 0.2 MG/DL (ref 0.2–1)
BILIRUB UR QL STRIP: NEGATIVE
BUN SERPL-MCNC: 17 MG/DL (ref 5–25)
CALCIUM SERPL-MCNC: 9.1 MG/DL (ref 8.3–10.1)
CHLORIDE SERPL-SCNC: 105 MMOL/L (ref 100–108)
CLARITY UR: CLEAR
CO2 SERPL-SCNC: 27 MMOL/L (ref 21–32)
COLOR UR: YELLOW
CREAT SERPL-MCNC: 1.28 MG/DL (ref 0.6–1.3)
EOSINOPHIL # BLD AUTO: 0.09 THOUSAND/ΜL (ref 0–0.61)
EOSINOPHIL NFR BLD AUTO: 1 % (ref 0–6)
ERYTHROCYTE [DISTWIDTH] IN BLOOD BY AUTOMATED COUNT: 12.4 % (ref 11.6–15.1)
GFR SERPL CREATININE-BSD FRML MDRD: 58 ML/MIN/1.73SQ M
GLUCOSE SERPL-MCNC: 122 MG/DL (ref 65–140)
GLUCOSE UR STRIP-MCNC: NEGATIVE MG/DL
HCT VFR BLD AUTO: 42.8 % (ref 36.5–49.3)
HGB BLD-MCNC: 14.4 G/DL (ref 12–17)
HGB UR QL STRIP.AUTO: ABNORMAL
IMM GRANULOCYTES # BLD AUTO: 0.06 THOUSAND/UL (ref 0–0.2)
IMM GRANULOCYTES NFR BLD AUTO: 1 % (ref 0–2)
KETONES UR STRIP-MCNC: NEGATIVE MG/DL
LEUKOCYTE ESTERASE UR QL STRIP: NEGATIVE
LIPASE SERPL-CCNC: 196 U/L (ref 73–393)
LYMPHOCYTES # BLD AUTO: 1.56 THOUSANDS/ΜL (ref 0.6–4.47)
LYMPHOCYTES NFR BLD AUTO: 13 % (ref 14–44)
MCH RBC QN AUTO: 29.8 PG (ref 26.8–34.3)
MCHC RBC AUTO-ENTMCNC: 33.6 G/DL (ref 31.4–37.4)
MCV RBC AUTO: 89 FL (ref 82–98)
MONOCYTES # BLD AUTO: 0.93 THOUSAND/ΜL (ref 0.17–1.22)
MONOCYTES NFR BLD AUTO: 8 % (ref 4–12)
NEUTROPHILS # BLD AUTO: 9.3 THOUSANDS/ΜL (ref 1.85–7.62)
NEUTS SEG NFR BLD AUTO: 77 % (ref 43–75)
NITRITE UR QL STRIP: NEGATIVE
NON-SQ EPI CELLS URNS QL MICRO: ABNORMAL /HPF
NRBC BLD AUTO-RTO: 0 /100 WBCS
PH UR STRIP.AUTO: 5.5 [PH]
PLATELET # BLD AUTO: 263 THOUSANDS/UL (ref 149–390)
PLATELET # BLD AUTO: 332 THOUSANDS/UL (ref 149–390)
PMV BLD AUTO: 8.4 FL (ref 8.9–12.7)
PMV BLD AUTO: 8.8 FL (ref 8.9–12.7)
POTASSIUM SERPL-SCNC: 3.9 MMOL/L (ref 3.5–5.3)
PROT SERPL-MCNC: 7.6 G/DL (ref 6.4–8.2)
PROT UR STRIP-MCNC: NEGATIVE MG/DL
RBC # BLD AUTO: 4.83 MILLION/UL (ref 3.88–5.62)
RBC #/AREA URNS AUTO: ABNORMAL /HPF
SODIUM SERPL-SCNC: 144 MMOL/L (ref 136–145)
SP GR UR STRIP.AUTO: 1.01 (ref 1–1.03)
UROBILINOGEN UR QL STRIP.AUTO: 0.2 E.U./DL
WBC # BLD AUTO: 11.99 THOUSAND/UL (ref 4.31–10.16)
WBC #/AREA URNS AUTO: ABNORMAL /HPF

## 2019-04-05 PROCEDURE — 99217 PR OBSERVATION CARE DISCHARGE MANAGEMENT: CPT | Performed by: FAMILY MEDICINE

## 2019-04-05 PROCEDURE — 85025 COMPLETE CBC W/AUTO DIFF WBC: CPT | Performed by: EMERGENCY MEDICINE

## 2019-04-05 PROCEDURE — 83690 ASSAY OF LIPASE: CPT | Performed by: EMERGENCY MEDICINE

## 2019-04-05 PROCEDURE — 96360 HYDRATION IV INFUSION INIT: CPT

## 2019-04-05 PROCEDURE — 99222 1ST HOSP IP/OBS MODERATE 55: CPT | Performed by: UROLOGY

## 2019-04-05 PROCEDURE — BT1F1ZZ FLUOROSCOPY OF LEFT KIDNEY, URETER AND BLADDER USING LOW OSMOLAR CONTRAST: ICD-10-PCS | Performed by: UROLOGY

## 2019-04-05 PROCEDURE — 80053 COMPREHEN METABOLIC PANEL: CPT | Performed by: EMERGENCY MEDICINE

## 2019-04-05 PROCEDURE — 85049 AUTOMATED PLATELET COUNT: CPT | Performed by: INTERNAL MEDICINE

## 2019-04-05 PROCEDURE — 74176 CT ABD & PELVIS W/O CONTRAST: CPT

## 2019-04-05 PROCEDURE — 99285 EMERGENCY DEPT VISIT HI MDM: CPT

## 2019-04-05 PROCEDURE — 52332 CYSTOSCOPY AND TREATMENT: CPT | Performed by: UROLOGY

## 2019-04-05 PROCEDURE — 36415 COLL VENOUS BLD VENIPUNCTURE: CPT | Performed by: EMERGENCY MEDICINE

## 2019-04-05 PROCEDURE — 74420 UROGRAPHY RTRGR +-KUB: CPT

## 2019-04-05 PROCEDURE — 96361 HYDRATE IV INFUSION ADD-ON: CPT

## 2019-04-05 PROCEDURE — 99285 EMERGENCY DEPT VISIT HI MDM: CPT | Performed by: EMERGENCY MEDICINE

## 2019-04-05 PROCEDURE — 99222 1ST HOSP IP/OBS MODERATE 55: CPT | Performed by: INTERNAL MEDICINE

## 2019-04-05 PROCEDURE — ERR1 ERRONEOUS ENCOUNTER-DISREGARD: Performed by: UROLOGY

## 2019-04-05 PROCEDURE — C2617 STENT, NON-COR, TEM W/O DEL: HCPCS | Performed by: UROLOGY

## 2019-04-05 PROCEDURE — C1769 GUIDE WIRE: HCPCS | Performed by: UROLOGY

## 2019-04-05 PROCEDURE — 0T778DZ DILATION OF LEFT URETER WITH INTRALUMINAL DEVICE, VIA NATURAL OR ARTIFICIAL OPENING ENDOSCOPIC: ICD-10-PCS | Performed by: UROLOGY

## 2019-04-05 PROCEDURE — 81001 URINALYSIS AUTO W/SCOPE: CPT | Performed by: EMERGENCY MEDICINE

## 2019-04-05 DEVICE — INLAY OPTIMA URETERAL STENT W/O GUIDEWIRE
Type: IMPLANTABLE DEVICE | Site: URETER | Status: NON-FUNCTIONAL
Brand: BARD® INLAY OPTIMA® URETERAL STENT
Removed: 2019-05-09

## 2019-04-05 RX ORDER — OXYCODONE HYDROCHLORIDE AND ACETAMINOPHEN 5; 325 MG/1; MG/1
1 TABLET ORAL ONCE
Status: COMPLETED | OUTPATIENT
Start: 2019-04-05 | End: 2019-04-05

## 2019-04-05 RX ORDER — MIDAZOLAM HYDROCHLORIDE 1 MG/ML
INJECTION INTRAMUSCULAR; INTRAVENOUS AS NEEDED
Status: DISCONTINUED | OUTPATIENT
Start: 2019-04-05 | End: 2019-04-05 | Stop reason: SURG

## 2019-04-05 RX ORDER — TAMSULOSIN HYDROCHLORIDE 0.4 MG/1
0.4 CAPSULE ORAL ONCE
Status: COMPLETED | OUTPATIENT
Start: 2019-04-05 | End: 2019-04-05

## 2019-04-05 RX ORDER — SODIUM CHLORIDE, SODIUM LACTATE, POTASSIUM CHLORIDE, CALCIUM CHLORIDE 600; 310; 30; 20 MG/100ML; MG/100ML; MG/100ML; MG/100ML
INJECTION, SOLUTION INTRAVENOUS CONTINUOUS PRN
Status: DISCONTINUED | OUTPATIENT
Start: 2019-04-05 | End: 2019-04-05 | Stop reason: SURG

## 2019-04-05 RX ORDER — PROPOFOL 10 MG/ML
INJECTION, EMULSION INTRAVENOUS AS NEEDED
Status: DISCONTINUED | OUTPATIENT
Start: 2019-04-05 | End: 2019-04-05 | Stop reason: SURG

## 2019-04-05 RX ORDER — CEFAZOLIN SODIUM 2 G/50ML
2000 SOLUTION INTRAVENOUS ONCE
Status: CANCELLED | OUTPATIENT
Start: 2019-05-09 | End: 2019-04-05

## 2019-04-05 RX ORDER — CEFAZOLIN SODIUM 2 G/50ML
2000 SOLUTION INTRAVENOUS ONCE
Status: COMPLETED | OUTPATIENT
Start: 2019-04-05 | End: 2019-04-05

## 2019-04-05 RX ORDER — CIPROFLOXACIN 500 MG/1
500 TABLET, FILM COATED ORAL EVERY 12 HOURS SCHEDULED
Qty: 4 TABLET | Refills: 0 | Status: SHIPPED | OUTPATIENT
Start: 2019-04-05 | End: 2019-04-07

## 2019-04-05 RX ORDER — ACETAMINOPHEN 325 MG/1
650 TABLET ORAL EVERY 6 HOURS PRN
Status: DISCONTINUED | OUTPATIENT
Start: 2019-04-05 | End: 2019-04-06 | Stop reason: HOSPADM

## 2019-04-05 RX ORDER — FENTANYL CITRATE/PF 50 MCG/ML
50 SYRINGE (ML) INJECTION
Status: DISCONTINUED | OUTPATIENT
Start: 2019-04-05 | End: 2019-04-05 | Stop reason: HOSPADM

## 2019-04-05 RX ORDER — CIPROFLOXACIN 2 MG/ML
400 INJECTION, SOLUTION INTRAVENOUS EVERY 12 HOURS
Status: DISCONTINUED | OUTPATIENT
Start: 2019-04-05 | End: 2019-04-06 | Stop reason: HOSPADM

## 2019-04-05 RX ORDER — OXYCODONE HYDROCHLORIDE AND ACETAMINOPHEN 5; 325 MG/1; MG/1
1 TABLET ORAL EVERY 6 HOURS PRN
Qty: 12 TABLET | Refills: 0 | Status: SHIPPED | OUTPATIENT
Start: 2019-04-05 | End: 2019-04-11

## 2019-04-05 RX ORDER — FENTANYL CITRATE 50 UG/ML
INJECTION, SOLUTION INTRAMUSCULAR; INTRAVENOUS AS NEEDED
Status: DISCONTINUED | OUTPATIENT
Start: 2019-04-05 | End: 2019-04-05 | Stop reason: SURG

## 2019-04-05 RX ORDER — FUROSEMIDE 10 MG/ML
INJECTION INTRAMUSCULAR; INTRAVENOUS AS NEEDED
Status: DISCONTINUED | OUTPATIENT
Start: 2019-04-05 | End: 2019-04-05 | Stop reason: SURG

## 2019-04-05 RX ORDER — ONDANSETRON 2 MG/ML
INJECTION INTRAMUSCULAR; INTRAVENOUS AS NEEDED
Status: DISCONTINUED | OUTPATIENT
Start: 2019-04-05 | End: 2019-04-05 | Stop reason: SURG

## 2019-04-05 RX ORDER — ONDANSETRON 4 MG/1
4 TABLET, FILM COATED ORAL EVERY 8 HOURS PRN
Qty: 20 TABLET | Refills: 0 | Status: SHIPPED | OUTPATIENT
Start: 2019-04-05 | End: 2019-09-18

## 2019-04-05 RX ORDER — FLUOXETINE HYDROCHLORIDE 20 MG/1
20 CAPSULE ORAL 2 TIMES DAILY
Status: DISCONTINUED | OUTPATIENT
Start: 2019-04-05 | End: 2019-04-06 | Stop reason: HOSPADM

## 2019-04-05 RX ORDER — DEXAMETHASONE SODIUM PHOSPHATE 10 MG/ML
INJECTION, SOLUTION INTRAMUSCULAR; INTRAVENOUS AS NEEDED
Status: DISCONTINUED | OUTPATIENT
Start: 2019-04-05 | End: 2019-04-05 | Stop reason: SURG

## 2019-04-05 RX ORDER — TAMSULOSIN HYDROCHLORIDE 0.4 MG/1
0.4 CAPSULE ORAL
Status: DISCONTINUED | OUTPATIENT
Start: 2019-04-05 | End: 2019-04-06 | Stop reason: HOSPADM

## 2019-04-05 RX ORDER — OXYBUTYNIN CHLORIDE 5 MG/1
5 TABLET ORAL ONCE
Status: COMPLETED | OUTPATIENT
Start: 2019-04-05 | End: 2019-04-05

## 2019-04-05 RX ORDER — SODIUM CHLORIDE, SODIUM LACTATE, POTASSIUM CHLORIDE, CALCIUM CHLORIDE 600; 310; 30; 20 MG/100ML; MG/100ML; MG/100ML; MG/100ML
20 INJECTION, SOLUTION INTRAVENOUS CONTINUOUS
Status: DISCONTINUED | OUTPATIENT
Start: 2019-04-05 | End: 2019-04-06 | Stop reason: HOSPADM

## 2019-04-05 RX ORDER — HYDROMORPHONE HCL/PF 1 MG/ML
0.5 SYRINGE (ML) INJECTION
Status: DISCONTINUED | OUTPATIENT
Start: 2019-04-05 | End: 2019-04-05 | Stop reason: HOSPADM

## 2019-04-05 RX ORDER — LIDOCAINE HYDROCHLORIDE 10 MG/ML
INJECTION, SOLUTION INFILTRATION; PERINEURAL AS NEEDED
Status: DISCONTINUED | OUTPATIENT
Start: 2019-04-05 | End: 2019-04-05 | Stop reason: SURG

## 2019-04-05 RX ORDER — TAMSULOSIN HYDROCHLORIDE 0.4 MG/1
0.4 CAPSULE ORAL
Qty: 30 CAPSULE | Refills: 0 | Status: SHIPPED | OUTPATIENT
Start: 2019-04-05 | End: 2019-09-18

## 2019-04-05 RX ORDER — DOCUSATE SODIUM 100 MG/1
100 CAPSULE, LIQUID FILLED ORAL 3 TIMES DAILY
Qty: 42 CAPSULE | Refills: 0 | Status: SHIPPED | OUTPATIENT
Start: 2019-04-05 | End: 2019-04-11

## 2019-04-05 RX ORDER — MAGNESIUM HYDROXIDE 1200 MG/15ML
LIQUID ORAL AS NEEDED
Status: DISCONTINUED | OUTPATIENT
Start: 2019-04-05 | End: 2019-04-05 | Stop reason: HOSPADM

## 2019-04-05 RX ORDER — IBUPROFEN 400 MG/1
400 TABLET ORAL EVERY 6 HOURS PRN
Status: DISCONTINUED | OUTPATIENT
Start: 2019-04-05 | End: 2019-04-05

## 2019-04-05 RX ORDER — SODIUM CHLORIDE 9 MG/ML
125 INJECTION, SOLUTION INTRAVENOUS CONTINUOUS
Status: DISCONTINUED | OUTPATIENT
Start: 2019-04-05 | End: 2019-04-06 | Stop reason: HOSPADM

## 2019-04-05 RX ORDER — LISINOPRIL 10 MG/1
10 TABLET ORAL DAILY
Status: DISCONTINUED | OUTPATIENT
Start: 2019-04-05 | End: 2019-04-06 | Stop reason: HOSPADM

## 2019-04-05 RX ORDER — HEPARIN SODIUM 5000 [USP'U]/ML
5000 INJECTION, SOLUTION INTRAVENOUS; SUBCUTANEOUS EVERY 8 HOURS SCHEDULED
Status: DISCONTINUED | OUTPATIENT
Start: 2019-04-05 | End: 2019-04-06 | Stop reason: HOSPADM

## 2019-04-05 RX ORDER — OXYBUTYNIN CHLORIDE 5 MG/1
5 TABLET ORAL 2 TIMES DAILY PRN
Qty: 60 TABLET | Refills: 0 | Status: SHIPPED | OUTPATIENT
Start: 2019-04-05 | End: 2019-09-18

## 2019-04-05 RX ADMIN — TAMSULOSIN HYDROCHLORIDE 0.4 MG: 0.4 CAPSULE ORAL at 20:56

## 2019-04-05 RX ADMIN — CEFAZOLIN SODIUM 2000 MG: 2 SOLUTION INTRAVENOUS at 17:40

## 2019-04-05 RX ADMIN — LIDOCAINE HYDROCHLORIDE 100 MG: 10 INJECTION, SOLUTION INFILTRATION; PERINEURAL at 17:51

## 2019-04-05 RX ADMIN — CIPROFLOXACIN 400 MG: 2 INJECTION, SOLUTION INTRAVENOUS at 08:39

## 2019-04-05 RX ADMIN — DEXAMETHASONE SODIUM PHOSPHATE 4 MG: 10 INJECTION, SOLUTION INTRAMUSCULAR; INTRAVENOUS at 17:54

## 2019-04-05 RX ADMIN — HEPARIN SODIUM 5000 UNITS: 5000 INJECTION INTRAVENOUS; SUBCUTANEOUS at 15:39

## 2019-04-05 RX ADMIN — FLUOXETINE HYDROCHLORIDE 20 MG: 20 CAPSULE ORAL at 20:09

## 2019-04-05 RX ADMIN — SODIUM CHLORIDE, SODIUM LACTATE, POTASSIUM CHLORIDE, AND CALCIUM CHLORIDE: .6; .31; .03; .02 INJECTION, SOLUTION INTRAVENOUS at 17:10

## 2019-04-05 RX ADMIN — MIDAZOLAM HYDROCHLORIDE 2 MG: 1 INJECTION, SOLUTION INTRAMUSCULAR; INTRAVENOUS at 17:51

## 2019-04-05 RX ADMIN — OXYBUTYNIN CHLORIDE 5 MG: 5 TABLET ORAL at 20:56

## 2019-04-05 RX ADMIN — ONDANSETRON 4 MG: 2 INJECTION INTRAMUSCULAR; INTRAVENOUS at 17:54

## 2019-04-05 RX ADMIN — FUROSEMIDE 10 MG: 10 INJECTION, SOLUTION INTRAMUSCULAR; INTRAVENOUS at 18:12

## 2019-04-05 RX ADMIN — PHENYLEPHRINE HYDROCHLORIDE 100 MCG: 10 INJECTION INTRAVENOUS at 17:53

## 2019-04-05 RX ADMIN — FENTANYL CITRATE 25 MCG: 50 INJECTION, SOLUTION INTRAMUSCULAR; INTRAVENOUS at 18:00

## 2019-04-05 RX ADMIN — HEPARIN SODIUM 5000 UNITS: 5000 INJECTION INTRAVENOUS; SUBCUTANEOUS at 08:39

## 2019-04-05 RX ADMIN — FLUOXETINE HYDROCHLORIDE 20 MG: 20 CAPSULE ORAL at 13:14

## 2019-04-05 RX ADMIN — LISINOPRIL 10 MG: 10 TABLET ORAL at 20:07

## 2019-04-05 RX ADMIN — SODIUM CHLORIDE 75 ML/HR: 0.9 INJECTION, SOLUTION INTRAVENOUS at 08:39

## 2019-04-05 RX ADMIN — PROPOFOL 200 MG: 10 INJECTION, EMULSION INTRAVENOUS at 17:52

## 2019-04-05 RX ADMIN — SODIUM CHLORIDE 1000 ML: 0.9 INJECTION, SOLUTION INTRAVENOUS at 05:04

## 2019-04-05 RX ADMIN — METHYLENE BLUE 10 ML: 5 INJECTION INTRAVENOUS at 18:16

## 2019-04-05 RX ADMIN — OXYCODONE AND ACETAMINOPHEN 1 TABLET: 5; 325 TABLET ORAL at 06:00

## 2019-04-08 ENCOUNTER — TELEPHONE (OUTPATIENT)
Dept: UROLOGY | Facility: AMBULATORY SURGERY CENTER | Age: 69
End: 2019-04-08

## 2019-04-08 ENCOUNTER — TRANSITIONAL CARE MANAGEMENT (OUTPATIENT)
Dept: FAMILY MEDICINE CLINIC | Facility: CLINIC | Age: 69
End: 2019-04-08

## 2019-04-09 ENCOUNTER — TELEPHONE (OUTPATIENT)
Dept: UROLOGY | Facility: CLINIC | Age: 69
End: 2019-04-09

## 2019-04-11 ENCOUNTER — OFFICE VISIT (OUTPATIENT)
Dept: FAMILY MEDICINE CLINIC | Facility: CLINIC | Age: 69
End: 2019-04-11
Payer: COMMERCIAL

## 2019-04-11 VITALS
HEART RATE: 60 BPM | SYSTOLIC BLOOD PRESSURE: 118 MMHG | TEMPERATURE: 98 F | OXYGEN SATURATION: 97 % | WEIGHT: 188 LBS | BODY MASS INDEX: 27.85 KG/M2 | RESPIRATION RATE: 18 BRPM | DIASTOLIC BLOOD PRESSURE: 78 MMHG | HEIGHT: 69 IN

## 2019-04-11 DIAGNOSIS — I10 ESSENTIAL HYPERTENSION: ICD-10-CM

## 2019-04-11 DIAGNOSIS — Z01.818 PRE-OP EXAMINATION: Primary | ICD-10-CM

## 2019-04-11 PROCEDURE — 1036F TOBACCO NON-USER: CPT | Performed by: NURSE PRACTITIONER

## 2019-04-11 PROCEDURE — 3074F SYST BP LT 130 MM HG: CPT | Performed by: NURSE PRACTITIONER

## 2019-04-11 PROCEDURE — 99214 OFFICE O/P EST MOD 30 MIN: CPT | Performed by: NURSE PRACTITIONER

## 2019-04-11 PROCEDURE — 3078F DIAST BP <80 MM HG: CPT | Performed by: NURSE PRACTITIONER

## 2019-04-11 PROCEDURE — 1111F DSCHRG MED/CURRENT MED MERGE: CPT | Performed by: NURSE PRACTITIONER

## 2019-04-11 RX ORDER — LISINOPRIL 10 MG/1
10 TABLET ORAL DAILY
Qty: 90 TABLET | Refills: 3 | Status: SHIPPED | OUTPATIENT
Start: 2019-04-11 | End: 2019-09-04 | Stop reason: SDUPTHER

## 2019-05-09 ENCOUNTER — ANESTHESIA (OUTPATIENT)
Dept: PERIOP | Facility: HOSPITAL | Age: 69
End: 2019-05-09
Payer: COMMERCIAL

## 2019-05-09 ENCOUNTER — TELEPHONE (OUTPATIENT)
Dept: UROLOGY | Facility: CLINIC | Age: 69
End: 2019-05-09

## 2019-05-09 ENCOUNTER — APPOINTMENT (OUTPATIENT)
Dept: RADIOLOGY | Facility: HOSPITAL | Age: 69
End: 2019-05-09
Payer: COMMERCIAL

## 2019-05-09 ENCOUNTER — ANESTHESIA EVENT (OUTPATIENT)
Dept: PERIOP | Facility: HOSPITAL | Age: 69
End: 2019-05-09
Payer: COMMERCIAL

## 2019-05-09 ENCOUNTER — HOSPITAL ENCOUNTER (OUTPATIENT)
Facility: HOSPITAL | Age: 69
Setting detail: OUTPATIENT SURGERY
Discharge: HOME | End: 2019-05-09
Attending: UROLOGY | Admitting: UROLOGY
Payer: COMMERCIAL

## 2019-05-09 VITALS
RESPIRATION RATE: 19 BRPM | WEIGHT: 180.78 LBS | SYSTOLIC BLOOD PRESSURE: 180 MMHG | HEART RATE: 58 BPM | TEMPERATURE: 98.1 F | HEIGHT: 69 IN | OXYGEN SATURATION: 98 % | DIASTOLIC BLOOD PRESSURE: 94 MMHG | BODY MASS INDEX: 26.78 KG/M2

## 2019-05-09 DIAGNOSIS — N20.0 NEPHROLITHIASIS: Primary | ICD-10-CM

## 2019-05-09 DIAGNOSIS — N20.0 NEPHROLITHIASIS: ICD-10-CM

## 2019-05-09 DIAGNOSIS — C61 PROSTATE CANCER (HCC): Primary | ICD-10-CM

## 2019-05-09 PROCEDURE — 74420 UROGRAPHY RTRGR +-KUB: CPT

## 2019-05-09 PROCEDURE — 82360 CALCULUS ASSAY QUANT: CPT | Performed by: UROLOGY

## 2019-05-09 PROCEDURE — C2617 STENT, NON-COR, TEM W/O DEL: HCPCS | Performed by: UROLOGY

## 2019-05-09 PROCEDURE — C1758 CATHETER, URETERAL: HCPCS | Performed by: UROLOGY

## 2019-05-09 PROCEDURE — 52356 CYSTO/URETERO W/LITHOTRIPSY: CPT | Performed by: UROLOGY

## 2019-05-09 PROCEDURE — C1769 GUIDE WIRE: HCPCS | Performed by: UROLOGY

## 2019-05-09 PROCEDURE — C1894 INTRO/SHEATH, NON-LASER: HCPCS | Performed by: UROLOGY

## 2019-05-09 PROCEDURE — NC001 PR NO CHARGE: Performed by: UROLOGY

## 2019-05-09 DEVICE — INLAY OPTIMA URETERAL STENT W/O GUIDEWIRE
Type: IMPLANTABLE DEVICE | Site: URETER | Status: FUNCTIONAL
Brand: BARD® INLAY OPTIMA® URETERAL STENT

## 2019-05-09 RX ORDER — DEXAMETHASONE SODIUM PHOSPHATE 4 MG/ML
INJECTION, SOLUTION INTRA-ARTICULAR; INTRALESIONAL; INTRAMUSCULAR; INTRAVENOUS; SOFT TISSUE AS NEEDED
Status: DISCONTINUED | OUTPATIENT
Start: 2019-05-09 | End: 2019-05-09 | Stop reason: SURG

## 2019-05-09 RX ORDER — DEXMEDETOMIDINE HYDROCHLORIDE 100 UG/ML
INJECTION, SOLUTION INTRAVENOUS AS NEEDED
Status: DISCONTINUED | OUTPATIENT
Start: 2019-05-09 | End: 2019-05-09 | Stop reason: SURG

## 2019-05-09 RX ORDER — CEPHALEXIN 500 MG/1
500 CAPSULE ORAL EVERY 12 HOURS SCHEDULED
Qty: 6 CAPSULE | Refills: 0 | Status: SHIPPED | OUTPATIENT
Start: 2019-05-09 | End: 2019-05-12

## 2019-05-09 RX ORDER — ACETAMINOPHEN 325 MG/1
650 TABLET ORAL EVERY 6 HOURS PRN
Status: DISCONTINUED | OUTPATIENT
Start: 2019-05-09 | End: 2019-05-09 | Stop reason: HOSPADM

## 2019-05-09 RX ORDER — CEFAZOLIN SODIUM 2 G/50ML
2000 SOLUTION INTRAVENOUS ONCE
Status: COMPLETED | OUTPATIENT
Start: 2019-05-09 | End: 2019-05-09

## 2019-05-09 RX ORDER — MAGNESIUM HYDROXIDE 1200 MG/15ML
LIQUID ORAL AS NEEDED
Status: DISCONTINUED | OUTPATIENT
Start: 2019-05-09 | End: 2019-05-09 | Stop reason: HOSPADM

## 2019-05-09 RX ORDER — OXYCODONE HYDROCHLORIDE 5 MG/1
5 TABLET ORAL EVERY 4 HOURS PRN
Status: DISCONTINUED | OUTPATIENT
Start: 2019-05-09 | End: 2019-05-09 | Stop reason: HOSPADM

## 2019-05-09 RX ORDER — ONDANSETRON 2 MG/ML
INJECTION INTRAMUSCULAR; INTRAVENOUS AS NEEDED
Status: DISCONTINUED | OUTPATIENT
Start: 2019-05-09 | End: 2019-05-09 | Stop reason: SURG

## 2019-05-09 RX ORDER — SODIUM CHLORIDE, SODIUM LACTATE, POTASSIUM CHLORIDE, CALCIUM CHLORIDE 600; 310; 30; 20 MG/100ML; MG/100ML; MG/100ML; MG/100ML
125 INJECTION, SOLUTION INTRAVENOUS CONTINUOUS
Status: DISCONTINUED | OUTPATIENT
Start: 2019-05-09 | End: 2019-05-09 | Stop reason: HOSPADM

## 2019-05-09 RX ORDER — OXYBUTYNIN CHLORIDE 5 MG/1
5 TABLET ORAL 3 TIMES DAILY PRN
Status: DISCONTINUED | OUTPATIENT
Start: 2019-05-09 | End: 2019-05-09 | Stop reason: HOSPADM

## 2019-05-09 RX ORDER — OXYCODONE HYDROCHLORIDE AND ACETAMINOPHEN 5; 325 MG/1; MG/1
1 TABLET ORAL EVERY 6 HOURS PRN
Qty: 10 TABLET | Refills: 0 | Status: SHIPPED | OUTPATIENT
Start: 2019-05-09 | End: 2019-05-14

## 2019-05-09 RX ORDER — FENTANYL CITRATE 50 UG/ML
INJECTION, SOLUTION INTRAMUSCULAR; INTRAVENOUS AS NEEDED
Status: DISCONTINUED | OUTPATIENT
Start: 2019-05-09 | End: 2019-05-09 | Stop reason: SURG

## 2019-05-09 RX ORDER — PROPOFOL 10 MG/ML
INJECTION, EMULSION INTRAVENOUS AS NEEDED
Status: DISCONTINUED | OUTPATIENT
Start: 2019-05-09 | End: 2019-05-09 | Stop reason: SURG

## 2019-05-09 RX ORDER — FENTANYL CITRATE/PF 50 MCG/ML
50 SYRINGE (ML) INJECTION
Status: DISCONTINUED | OUTPATIENT
Start: 2019-05-09 | End: 2019-05-09 | Stop reason: HOSPADM

## 2019-05-09 RX ORDER — METOCLOPRAMIDE HYDROCHLORIDE 5 MG/ML
10 INJECTION INTRAMUSCULAR; INTRAVENOUS ONCE AS NEEDED
Status: DISCONTINUED | OUTPATIENT
Start: 2019-05-09 | End: 2019-05-09 | Stop reason: HOSPADM

## 2019-05-09 RX ORDER — ONDANSETRON 2 MG/ML
4 INJECTION INTRAMUSCULAR; INTRAVENOUS EVERY 6 HOURS PRN
Status: DISCONTINUED | OUTPATIENT
Start: 2019-05-09 | End: 2019-05-09 | Stop reason: HOSPADM

## 2019-05-09 RX ADMIN — FENTANYL CITRATE 25 MCG: 50 INJECTION, SOLUTION INTRAMUSCULAR; INTRAVENOUS at 14:34

## 2019-05-09 RX ADMIN — DEXAMETHASONE SODIUM PHOSPHATE 4 MG: 4 INJECTION, SOLUTION INTRAMUSCULAR; INTRAVENOUS at 13:49

## 2019-05-09 RX ADMIN — FENTANYL CITRATE 25 MCG: 50 INJECTION, SOLUTION INTRAMUSCULAR; INTRAVENOUS at 14:04

## 2019-05-09 RX ADMIN — PROPOFOL 200 MG: 10 INJECTION, EMULSION INTRAVENOUS at 13:45

## 2019-05-09 RX ADMIN — DEXMEDETOMIDINE HCL 4 MCG: 100 INJECTION INTRAVENOUS at 13:49

## 2019-05-09 RX ADMIN — ONDANSETRON 4 MG: 2 INJECTION INTRAMUSCULAR; INTRAVENOUS at 13:42

## 2019-05-09 RX ADMIN — SODIUM CHLORIDE, SODIUM LACTATE, POTASSIUM CHLORIDE, AND CALCIUM CHLORIDE 125 ML/HR: .6; .31; .03; .02 INJECTION, SOLUTION INTRAVENOUS at 11:43

## 2019-05-09 RX ADMIN — FENTANYL CITRATE 25 MCG: 50 INJECTION, SOLUTION INTRAMUSCULAR; INTRAVENOUS at 13:52

## 2019-05-09 RX ADMIN — CEFAZOLIN SODIUM 2000 MG: 2 SOLUTION INTRAVENOUS at 13:38

## 2019-05-09 RX ADMIN — LIDOCAINE HYDROCHLORIDE 100 MG: 20 INJECTION, SOLUTION INTRAVENOUS at 13:45

## 2019-05-09 RX ADMIN — SODIUM CHLORIDE, SODIUM LACTATE, POTASSIUM CHLORIDE, AND CALCIUM CHLORIDE 125 ML/HR: .6; .31; .03; .02 INJECTION, SOLUTION INTRAVENOUS at 11:41

## 2019-05-13 ENCOUNTER — APPOINTMENT (OUTPATIENT)
Dept: LAB | Facility: CLINIC | Age: 69
End: 2019-05-13
Payer: COMMERCIAL

## 2019-05-13 ENCOUNTER — TELEPHONE (OUTPATIENT)
Dept: UROLOGY | Facility: CLINIC | Age: 69
End: 2019-05-13

## 2019-05-13 DIAGNOSIS — C61 PROSTATE CANCER (HCC): ICD-10-CM

## 2019-05-13 LAB — PSA SERPL-MCNC: <0.1 NG/ML (ref 0–4)

## 2019-05-13 PROCEDURE — 84153 ASSAY OF PSA TOTAL: CPT

## 2019-05-14 LAB
CA PHOS MFR STONE: 5 %
COLOR STONE: NORMAL
COM MFR STONE: 95 %
COMMENT-STONE3: NORMAL
COMPOSITION: NORMAL
LABORATORY COMMENT REPORT: NORMAL
NIDUS STONE QL: NORMAL
PHOTO: NORMAL
SIZE STONE: NORMAL MM
STONE ANALYSIS-IMP: NORMAL
WT STONE: 96.9 MG

## 2019-05-15 ENCOUNTER — PROCEDURE VISIT (OUTPATIENT)
Dept: UROLOGY | Facility: CLINIC | Age: 69
End: 2019-05-15
Payer: COMMERCIAL

## 2019-05-15 VITALS
SYSTOLIC BLOOD PRESSURE: 136 MMHG | HEIGHT: 69 IN | BODY MASS INDEX: 26.66 KG/M2 | DIASTOLIC BLOOD PRESSURE: 88 MMHG | HEART RATE: 68 BPM | WEIGHT: 180 LBS

## 2019-05-15 DIAGNOSIS — C61 PROSTATE CANCER (HCC): ICD-10-CM

## 2019-05-15 DIAGNOSIS — N20.0 NEPHROLITHIASIS: Primary | ICD-10-CM

## 2019-05-15 DIAGNOSIS — Z46.6 ENCOUNTER FOR REMOVAL OF URETERAL STENT: ICD-10-CM

## 2019-05-15 DIAGNOSIS — N20.0 RENAL STONE: ICD-10-CM

## 2019-05-15 PROCEDURE — 99213 OFFICE O/P EST LOW 20 MIN: CPT | Performed by: UROLOGY

## 2019-05-15 PROCEDURE — 52310 CYSTOSCOPY AND TREATMENT: CPT | Performed by: UROLOGY

## 2019-05-15 PROCEDURE — 1160F RVW MEDS BY RX/DR IN RCRD: CPT | Performed by: UROLOGY

## 2019-06-06 ENCOUNTER — HOSPITAL ENCOUNTER (OUTPATIENT)
Dept: RADIOLOGY | Facility: HOSPITAL | Age: 69
Discharge: HOME | End: 2019-06-06
Attending: UROLOGY
Payer: COMMERCIAL

## 2019-06-06 ENCOUNTER — HOSPITAL ENCOUNTER (OUTPATIENT)
Dept: ULTRASOUND IMAGING | Facility: HOSPITAL | Age: 69
Discharge: HOME | End: 2019-06-06
Attending: UROLOGY
Payer: COMMERCIAL

## 2019-06-06 DIAGNOSIS — N20.0 NEPHROLITHIASIS: ICD-10-CM

## 2019-06-06 PROCEDURE — 76770 US EXAM ABDO BACK WALL COMP: CPT

## 2019-06-06 PROCEDURE — 74018 RADEX ABDOMEN 1 VIEW: CPT

## 2019-06-19 ENCOUNTER — OFFICE VISIT (OUTPATIENT)
Dept: UROLOGY | Facility: CLINIC | Age: 69
End: 2019-06-19
Payer: COMMERCIAL

## 2019-06-19 VITALS
HEIGHT: 69 IN | HEART RATE: 67 BPM | WEIGHT: 183 LBS | DIASTOLIC BLOOD PRESSURE: 90 MMHG | SYSTOLIC BLOOD PRESSURE: 142 MMHG | BODY MASS INDEX: 27.11 KG/M2

## 2019-06-19 DIAGNOSIS — N20.0 NEPHROLITHIASIS: ICD-10-CM

## 2019-06-19 DIAGNOSIS — C61 PROSTATE CANCER (HCC): Primary | ICD-10-CM

## 2019-06-19 DIAGNOSIS — N20.0 RENAL STONE: ICD-10-CM

## 2019-06-19 PROBLEM — Z01.818 PRE-OP EXAMINATION: Status: RESOLVED | Noted: 2019-02-27 | Resolved: 2019-06-19

## 2019-06-19 PROCEDURE — 99214 OFFICE O/P EST MOD 30 MIN: CPT | Performed by: UROLOGY

## 2019-08-30 ENCOUNTER — TELEPHONE (OUTPATIENT)
Dept: NEPHROLOGY | Facility: CLINIC | Age: 69
End: 2019-08-30

## 2019-08-30 NOTE — TELEPHONE ENCOUNTER
Called and left a message on machine for patient confirming his appointment for Tuesday 9/3/2019 Nephrology Consult with Dr Sharon Daniel,

## 2019-09-03 ENCOUNTER — CONSULT (OUTPATIENT)
Dept: NEPHROLOGY | Facility: CLINIC | Age: 69
End: 2019-09-03
Payer: COMMERCIAL

## 2019-09-03 VITALS
DIASTOLIC BLOOD PRESSURE: 82 MMHG | BODY MASS INDEX: 27.7 KG/M2 | WEIGHT: 187 LBS | HEART RATE: 59 BPM | TEMPERATURE: 97.1 F | RESPIRATION RATE: 16 BRPM | SYSTOLIC BLOOD PRESSURE: 140 MMHG | HEIGHT: 69 IN

## 2019-09-03 DIAGNOSIS — I10 ESSENTIAL HYPERTENSION: ICD-10-CM

## 2019-09-03 DIAGNOSIS — N20.0 NEPHROLITHIASIS: Primary | ICD-10-CM

## 2019-09-03 PROCEDURE — 99204 OFFICE O/P NEW MOD 45 MIN: CPT | Performed by: INTERNAL MEDICINE

## 2019-09-03 NOTE — LETTER
September 3, 2019     Martha Nj, [address] [address] [place]    Patient: Atul Chun   YOB: 1950   Date of Visit: 9/3/2019       Dear Dr Chelita Brennan:    Thank you for referring Nakita Fajardo to me for evaluation  Below are my notes for this consultation  If you have questions, please do not hesitate to call me  I look forward to following your patient along with you  Sincerely,        Estefani Gastelum MD        CC: MD Estefani Johnson MD  9/3/2019  8:44 AM  Sign at close encounter  [address] 80 y o  @SEX @ YOB: 1950 MRN: [id number]    Encounter: [id number] DATE: 9/3/2019    REASON FOR VISIT: Atul Chun is a 80 y o male who was referred by Dr Alexis for metabolic stone workup  HPI:    This is a 67-year-old male with past medical history of hyperlipidemia, hypertension, psoriasis, prostate cancer, kidney stone, referred to Nephrology for metabolic stone workup for further management of recurrent nephrolithiasis  Patient said that he was initially diagnosed with kidney stone almost 10 years back  In March 2019 patient had underwent prostatectomy for management of prostate cancer and 1 month later he developed recurrent kidney stone  Patient had left ureteral stent placed and eventually underwent lithotripsy  Stone analysis done on 5/9/2019 showed 95% calcium oxalate stone and 5% calcium phosphate stone  Patient had also underwent renal ultrasound on 6/6/2019 showed bilateral nonobstructive renal calculi  According to patient currently his urine is clear in color  Patient denies any family history of kidney stone  Patient also has hypertension and has been taking antihypertensive medication for last 8 years and said that his blood pressure is staying under good control on current regimen      Patient also was found to have hyperlipidemia and was started on atorvastatin in the past  Patient has developed and pain and has stop taking atorvastatin 1 month back  According to patient his hand pain has improved since that time  Patient also occasionally uses ibuprofen  REVIEW OF SYSTEMS:    Review of Systems   Constitutional: Negative for chills and fever  HENT: Negative for nosebleeds and sore throat  Eyes: Negative for photophobia and pain  Respiratory: Negative for choking and wheezing  Cardiovascular: Negative for chest pain and palpitations  Gastrointestinal: Negative for abdominal pain and blood in stool  Endocrine: Negative for cold intolerance and heat intolerance  Genitourinary: Negative for flank pain and hematuria  Musculoskeletal: Negative for joint swelling and neck pain  Skin: Negative for color change and pallor  Allergic/Immunologic: Negative for environmental allergies and immunocompromised state  Neurological: Negative for seizures and syncope  Hematological: Negative for adenopathy  Does not bruise/bleed easily  Psychiatric/Behavioral: Negative for confusion and suicidal ideas           PAST MEDICAL HISTORY:  Past Medical History:   Diagnosis Date    Arthritis     Cancer ([address] )     prostate    Hyperlipidemia     Hypertension     OCD (obsessive compulsive disorder)        PAST SURGICAL HISTORY:  Past Surgical History:   Procedure Laterality Date    FINGER AMPUTATION Left     partial middle and ring    FL RETROGRADE PYELOGRAM  4/5/2019    FL RETROGRADE PYELOGRAM  5/9/2019    KNEE SURGERY Right     menisectomy    VT CYSTO/URETERO W/LITHOTRIPSY &INDWELL STENT INSRT Left 4/5/2019    Procedure: CYSTOSCOPY URETEROSCOPY WITH  INSERTION STENT URETERAL;  Surgeon: Darius Arnold MD;  [facility];  Service: Urology    VT CYSTO/URETERO W/LITHOTRIPSY &INDWELL STENT INSRT Left 5/9/2019    Procedure: CYSTOSCOPY URETEROSCOPY WITH LITHOTRIPSY HOLMIUM LASER, RETROGRADE PYELOGRAM AND INSERTION STENT URETERAL;  Surgeon: Darius Arnold MD; [facility];  Service: Urology    [place] LAP,PROSTATECTOMY,RADICAL,W/NERVE SPARE,INCL ROBOTIC N/A 3/18/2019    Procedure: ROBOTIC RADICAL PROSTATECTOMY; BILATERAL PELVIC LYMPH NODE DISSECTION;  Surgeon: Slava Nash MD;  [facility];  Service: Urology       SOCIAL HISTORY:  Social History     Substance and Sexual Activity   Alcohol Use No     Social History     Substance and Sexual Activity   Drug Use No     Social History     Tobacco Use   Smoking Status Former Smoker   Smokeless Tobacco Never Used   Tobacco Comment    30+ years ago       FAMILY HISTORY:  Family History   Problem Relation Age of Onset    No Known Problems Mother     No Known Problems Father     Cancer Sister     No Known Problems Brother        ALLERGY:  No Known Allergies    MEDICATIONS:    Current Outpatient Medications:     FLUoxetine (PROzac) 20 mg capsule, Take 1 capsule (20 mg total) by mouth 2 (two) times a day for 90 days, Disp: 180 capsule, Rfl: 3    lisinopril (ZESTRIL) 10 mg tablet, Take 1 tablet (10 mg total) by mouth daily for 90 days, Disp: 90 tablet, Rfl: 3    atorvastatin (LIPITOR) 10 mg tablet, Take 1 tablet (10 mg total) by mouth daily for 90 days (Patient not taking: Reported on 9/3/2019), Disp: 90 tablet, Rfl: 3    co-enzyme Q-10 30 MG capsule, Take 200 mg by mouth daily, Disp: , Rfl:     docusate sodium (COLACE) 100 mg capsule, Take 1 capsule (100 mg total) by mouth 3 (three) times a day for 30 days (Patient not taking: Reported on 9/3/2019), Disp: 90 capsule, Rfl: 0    ondansetron (ZOFRAN) 4 mg tablet, Take 1 tablet (4 mg total) by mouth every 8 (eight) hours as needed for nausea or vomiting (Patient not taking: Reported on 9/3/2019), Disp: 20 tablet, Rfl: 0    oxybutynin (DITROPAN) 5 mg tablet, Take 1 tablet (5 mg total) by mouth 2 (two) times a day as needed (stent colic and pain) for up to 30 days (Patient not taking: Reported on 9/3/2019), Disp: 60 tablet, Rfl: 0    sildenafil (REVATIO) 20 mg tablet, Take 2-3 tablets by mouth nightly (Patient not taking: Reported on 9/3/2019), Disp: 60 tablet, Rfl: 6    tamsulosin (FLOMAX) 0 4 mg, Take 1 capsule (0 4 mg total) by mouth daily with dinner (Patient not taking: Reported on 9/3/2019), Disp: 30 capsule, Rfl: 0    PHYSICAL EXAM:  Vitals:    09/03/19 0815   BP: 140/82   BP Location: Left arm   Patient Position: Sitting   Cuff Size: Large   Pulse: 59   Resp: 16   Temp: (!) 97 1 °F (36 2 °C)   TempSrc: Temporal   Weight: 84 8 kg (187 lb)   Height: 5' 9" (1 753 m)     Body mass index is 27 62 kg/m²  Physical Exam   Constitutional: He appears well-nourished  No distress  HENT:   Head: Normocephalic and atraumatic  Eyes: No scleral icterus  Neck: Neck supple  No JVD present  Cardiovascular: Normal rate, S1 normal and S2 normal    Pulmonary/Chest: Effort normal  No accessory muscle usage  No respiratory distress  He has no wheezes  Abdominal: Soft  He exhibits no distension  Musculoskeletal: He exhibits no edema  Right ankle: He exhibits no swelling  Left ankle: He exhibits no swelling  Right hand: He exhibits no laceration  Left hand: He exhibits no laceration  Lymphadenopathy:        Right cervical: No superficial cervical adenopathy present  Left cervical: No superficial cervical adenopathy present  Neurological: He is alert  He is not disoriented  Skin: Skin is warm  No cyanosis  Psychiatric: He is not combative  He does not exhibit a depressed mood  He expresses no suicidal ideation  LAB RESULTS:  Results for orders placed or performed in visit on 05/13/19   PSA Total, Diagnostic   Result Value Ref Range    PSA, Diagnostic <0 1 0 0 - 4 0 ng/mL     Renal ultrasound done on 6/6/2019 showed bilateral nonobstructive renal calculi (4 mm on right side, 8 and 9 mm on left side)    ASSESSMENT and PLAN:  Michael Tyson was seen today for consult      Diagnoses and all orders for this visit:    Nephrolithiasis  - Ambulatory referral to Nephrology  -     Basic metabolic panel; Future  -     Phosphorus; Future  -     Uric acid; Future  -     PTH, intact; Future    Essential hypertension      1  Nephrolithiasis  Recurrent, exact etiology is currently unclear  According to patient he was initially diagnosed with kidney stone 10 years back  Patient had underwent prostatectomy as a management of prostate cancer in March 2019 and 1 month later he has developed another kidney stone  Patient has also underwent left ureteral stent placement followed by lithotripsy  Renal ultrasound done on 6/6/2019 showed bilateral nonobstructive renal calculi (4 mm right side, 8 and 9 mm on left side)  Currently according to patient his urine is clear in color  Stone analysis done on 5/9/2019 showed 95% calcium oxalate and 5% calcium phosphate stone  Plan to check BMP, uric acid, phosphorus, PTH along with 24 hour urine collection for stone protocol for metabolic stone workup  Currently patient is not taking calcium, vitamin-C or vitamin-D supplement  Advised patient to drink at least 3 L of water on daily basis to maintain daily urinary output > 2 L  Also advised patient to follow low-salt diet  I would wait for today's workup to come back before next step in management/dietary modification versus medication  2  Hypertension  Essential, today's blood pressure was under well control and plan to monitor hypertension with lisinopril 10 mg PO daily  Patient was also advised to follow low-salt diet  3  CKD stage 2-3  Multifactorial and can be secondary to underlying hypertensive nephrosclerosis on top of recurrent nephrolithiasis  Upon review of old medical record patient's baseline creatinine seems to be fluctuating around 1 1-1 3  Patient's last known creatinine was 1 28 with EGFR of 58 and advised patient to avoid NSAIDs including ibuprofen  Currently hypertension is under well control      As mentioned earlier, avoid NSAIDs and recheck renal function before next visit  Patient can be reached at [phone]    Portions of the record may have been created with voice recognition software  Occasional wrong word or "sound a like" substitutions may have occurred due to the inherent limitations of voice recognition software  Read the chart carefully and recognize, using context, where substitutions have occurred  If you have any questions, please contact the dictating provider

## 2019-09-03 NOTE — PROGRESS NOTES
NEPHROLOGY OFFICE CONSULT  Melisa Encarnacion 80 y o  @[facility] @ YOB: 1950 MRN: [id number]    Encounter: [id number] DATE: 9/3/2019    REASON FOR VISIT: Luis Felipe Medina is a 80 y o male who was referred by Dr Alexis for metabolic stone workup  HPI:    This is a 72-year-old male with past medical history of hyperlipidemia, hypertension, psoriasis, prostate cancer, kidney stone, referred to Nephrology for metabolic stone workup for further management of recurrent nephrolithiasis  Patient said that he was initially diagnosed with kidney stone almost 10 years back  In March 2019 patient had underwent prostatectomy for management of prostate cancer and 1 month later he developed recurrent kidney stone  Patient had left ureteral stent placed and eventually underwent lithotripsy  Stone analysis done on 5/9/2019 showed 95% calcium oxalate stone and 5% calcium phosphate stone  Patient had also underwent renal ultrasound on 6/6/2019 showed bilateral nonobstructive renal calculi  According to patient currently his urine is clear in color  Patient denies any family history of kidney stone  Patient also has hypertension and has been taking antihypertensive medication for last 8 years and said that his blood pressure is staying under good control on current regimen  Patient also was found to have hyperlipidemia and was started on atorvastatin in the past   Patient has developed and pain and has stop taking atorvastatin 1 month back  According to patient his hand pain has improved since that time  Patient also occasionally uses ibuprofen  REVIEW OF SYSTEMS:    Review of Systems   Constitutional: Negative for chills and fever  HENT: Negative for nosebleeds and sore throat  Eyes: Negative for photophobia and pain  Respiratory: Negative for choking and wheezing  Cardiovascular: Negative for chest pain and palpitations  Gastrointestinal: Negative for abdominal pain and blood in stool  Endocrine: Negative for cold intolerance and heat intolerance  Genitourinary: Negative for flank pain and hematuria  Musculoskeletal: Negative for joint swelling and neck pain  Skin: Negative for color change and pallor  Allergic/Immunologic: Negative for environmental allergies and immunocompromised state  Neurological: Negative for seizures and syncope  Hematological: Negative for adenopathy  Does not bruise/bleed easily  Psychiatric/Behavioral: Negative for confusion and suicidal ideas           PAST MEDICAL HISTORY:  Past Medical History:   Diagnosis Date    Arthritis     Cancer ([address] )     prostate    Hyperlipidemia     Hypertension     OCD (obsessive compulsive disorder)        PAST SURGICAL HISTORY:  Past Surgical History:   Procedure Laterality Date    FINGER AMPUTATION Left     partial middle and ring    FL RETROGRADE PYELOGRAM  4/5/2019    FL RETROGRADE PYELOGRAM  5/9/2019    KNEE SURGERY Right     menisectomy    [place] CYSTO/URETERO W/LITHOTRIPSY &INDWELL STENT INSRT Left 4/5/2019    Procedure: CYSTOSCOPY URETEROSCOPY WITH  INSERTION STENT URETERAL;  Surgeon: Conner Barajas MD;  [facility];  Service: Urology    CO CYSTO/URETERO W/LITHOTRIPSY &INDWELL STENT INSRT Left 5/9/2019    Procedure: CYSTOSCOPY URETEROSCOPY WITH LITHOTRIPSY HOLMIUM LASER, RETROGRADE PYELOGRAM AND INSERTION STENT URETERAL;  Surgeon: Conner Barajas MD;  [facility];  Service: Urology    [place] LAP,PROSTATECTOMY,RADICAL,W/NERVE [address] ROBOTIC N/A 3/18/2019    Procedure: ROBOTIC RADICAL PROSTATECTOMY; BILATERAL PELVIC LYMPH NODE DISSECTION;  Surgeon: Conner Barajas MD;  [facility];  Service: Urology       SOCIAL HISTORY:  Social History     Substance and Sexual Activity   Alcohol Use No     Social History     Substance and Sexual Activity   Drug Use No     Social History     Tobacco Use   Smoking Status Former Smoker   Smokeless Tobacco Never Used   Tobacco Comment    30+ years ago FAMILY HISTORY:  Family History   Problem Relation Age of Onset    No Known Problems Mother     No Known Problems Father     Cancer Sister     No Known Problems Brother        ALLERGY:  No Known Allergies    MEDICATIONS:    Current Outpatient Medications:     FLUoxetine (PROzac) 20 mg capsule, Take 1 capsule (20 mg total) by mouth 2 (two) times a day for 90 days, Disp: 180 capsule, Rfl: 3    lisinopril (ZESTRIL) 10 mg tablet, Take 1 tablet (10 mg total) by mouth daily for 90 days, Disp: 90 tablet, Rfl: 3    atorvastatin (LIPITOR) 10 mg tablet, Take 1 tablet (10 mg total) by mouth daily for 90 days (Patient not taking: Reported on 9/3/2019), Disp: 90 tablet, Rfl: 3    co-enzyme Q-10 30 MG capsule, Take 200 mg by mouth daily, Disp: , Rfl:     docusate sodium (COLACE) 100 mg capsule, Take 1 capsule (100 mg total) by mouth 3 (three) times a day for 30 days (Patient not taking: Reported on 9/3/2019), Disp: 90 capsule, Rfl: 0    ondansetron (ZOFRAN) 4 mg tablet, Take 1 tablet (4 mg total) by mouth every 8 (eight) hours as needed for nausea or vomiting (Patient not taking: Reported on 9/3/2019), Disp: 20 tablet, Rfl: 0    oxybutynin (DITROPAN) 5 mg tablet, Take 1 tablet (5 mg total) by mouth 2 (two) times a day as needed (stent colic and pain) for up to 30 days (Patient not taking: Reported on 9/3/2019), Disp: 60 tablet, Rfl: 0    sildenafil (REVATIO) 20 mg tablet, Take 2-3 tablets by mouth nightly (Patient not taking: Reported on 9/3/2019), Disp: 60 tablet, Rfl: 6    tamsulosin (FLOMAX) 0 4 mg, Take 1 capsule (0 4 mg total) by mouth daily with dinner (Patient not taking: Reported on 9/3/2019), Disp: 30 capsule, Rfl: 0    PHYSICAL EXAM:  Vitals:    09/03/19 0815   BP: 140/82   BP Location: Left arm   Patient Position: Sitting   Cuff Size: Large   Pulse: 59   Resp: 16   Temp: (!) 97 1 °F (36 2 °C)   TempSrc: Temporal   Weight: 84 8 kg (187 lb)   Height: 5' 9" (1 753 m)     Body mass index is 27 62 kg/m²  Physical Exam   Constitutional: He appears well-nourished  No distress  HENT:   Head: Normocephalic and atraumatic  Eyes: No scleral icterus  Neck: Neck supple  No JVD present  Cardiovascular: Normal rate, S1 normal and S2 normal    Pulmonary/Chest: Effort normal  No accessory muscle usage  No respiratory distress  He has no wheezes  Abdominal: Soft  He exhibits no distension  Musculoskeletal: He exhibits no edema  Right ankle: He exhibits no swelling  Left ankle: He exhibits no swelling  Right hand: He exhibits no laceration  Left hand: He exhibits no laceration  Lymphadenopathy:        Right cervical: No superficial cervical adenopathy present  Left cervical: No superficial cervical adenopathy present  Neurological: He is alert  He is not disoriented  Skin: Skin is warm  No cyanosis  Psychiatric: He is not combative  He does not exhibit a depressed mood  He expresses no suicidal ideation  LAB RESULTS:  Results for orders placed or performed in visit on 05/13/19   PSA Total, Diagnostic   Result Value Ref Range    PSA, Diagnostic <0 1 0 0 - 4 0 ng/mL     Renal ultrasound done on 6/6/2019 showed bilateral nonobstructive renal calculi (4 mm on right side, 8 and 9 mm on left side)    ASSESSMENT and PLAN:  Huber Kahn was seen today for consult  Diagnoses and all orders for this visit:    Nephrolithiasis  -     Ambulatory referral to Nephrology  -     Basic metabolic panel; Future  -     Phosphorus; Future  -     Uric acid; Future  -     PTH, intact; Future    Essential hypertension      1  Nephrolithiasis  Recurrent, exact etiology is currently unclear  According to patient he was initially diagnosed with kidney stone 10 years back  Patient had underwent prostatectomy as a management of prostate cancer in March 2019 and 1 month later he has developed another kidney stone    Patient has also underwent left ureteral stent placement followed by lithotripsy  Renal ultrasound done on 6/6/2019 showed bilateral nonobstructive renal calculi (4 mm right side, 8 and 9 mm on left side)  Currently according to patient his urine is clear in color  Stone analysis done on 5/9/2019 showed 95% calcium oxalate and 5% calcium phosphate stone  Plan to check BMP, uric acid, phosphorus, PTH along with 24 hour urine collection for stone protocol for metabolic stone workup  Currently patient is not taking calcium, vitamin-C or vitamin-D supplement  Advised patient to drink at least 3 L of water on daily basis to maintain daily urinary output > 2 L  Also advised patient to follow low-salt diet  I would wait for today's workup to come back before next step in management/dietary modification versus medication  2  Hypertension  Essential, today's blood pressure was under well control and plan to monitor hypertension with lisinopril 10 mg PO daily  Patient was also advised to follow low-salt diet  3  CKD stage 2-3  Multifactorial and can be secondary to underlying hypertensive nephrosclerosis on top of recurrent nephrolithiasis  Upon review of old medical record patient's baseline creatinine seems to be fluctuating around 1 1-1 3  Patient's last known creatinine was 1 28 with EGFR of 58 and advised patient to avoid NSAIDs including ibuprofen  Currently hypertension is under well control  As mentioned earlier, avoid NSAIDs and recheck renal function before next visit  Patient can be reached at [phone]    Labs (reviewed on 9/4/2019)-called and left message to the patient  Worsened creatinine at 1 49 with EGFR of 48-> advised patient to avoid NSAIDs and drink plenty of water  Normal uric acid (6 8), PTH (54), sodium, potassium, bicarb, calcium and phosphorus  Pending 24 hour urine collection  24 hour urine collection (done on 9/10/2019)  Total urine volume 3 06 L  SS calcium oxalate 2 66 (adequate)-> calcium 86    Oxalate 44-> follow low oxalate diet + avoid calcium and vitamin C supplementation  Citrate 1019  PH 5 583-> previous stone analysis did not reveal uric acid stone and would not consider initiation of potassium citrate  SS calcium phosphate 0 09  SS uric acid 0 82  Urine uric acid 0 599  Sodium 122  Portions of the record may have been created with voice recognition software  Occasional wrong word or "sound a like" substitutions may have occurred due to the inherent limitations of voice recognition software  Read the chart carefully and recognize, using context, where substitutions have occurred  If you have any questions, please contact the dictating provider

## 2019-09-04 ENCOUNTER — APPOINTMENT (OUTPATIENT)
Dept: LAB | Facility: CLINIC | Age: 69
End: 2019-09-04
Payer: COMMERCIAL

## 2019-09-04 DIAGNOSIS — I10 ESSENTIAL HYPERTENSION: ICD-10-CM

## 2019-09-04 DIAGNOSIS — N20.0 NEPHROLITHIASIS: ICD-10-CM

## 2019-09-04 DIAGNOSIS — F42.8 OTHER OBSESSIVE-COMPULSIVE DISORDERS: ICD-10-CM

## 2019-09-04 DIAGNOSIS — C61 PROSTATE CANCER (HCC): ICD-10-CM

## 2019-09-04 LAB
ANION GAP SERPL CALCULATED.3IONS-SCNC: 10 MMOL/L (ref 4–13)
BUN SERPL-MCNC: 26 MG/DL (ref 5–25)
CALCIUM SERPL-MCNC: 9.5 MG/DL (ref 8.3–10.1)
CHLORIDE SERPL-SCNC: 104 MMOL/L (ref 100–108)
CO2 SERPL-SCNC: 26 MMOL/L (ref 21–32)
CREAT SERPL-MCNC: 1.49 MG/DL (ref 0.6–1.3)
GFR SERPL CREATININE-BSD FRML MDRD: 48 ML/MIN/1.73SQ M
GLUCOSE P FAST SERPL-MCNC: 90 MG/DL (ref 65–99)
PHOSPHATE SERPL-MCNC: 3 MG/DL (ref 2.3–4.1)
POTASSIUM SERPL-SCNC: 4.8 MMOL/L (ref 3.5–5.3)
PSA SERPL-MCNC: <0.1 NG/ML (ref 0–4)
PTH-INTACT SERPL-MCNC: 54.8 PG/ML (ref 18.4–80.1)
SODIUM SERPL-SCNC: 140 MMOL/L (ref 136–145)
URATE SERPL-MCNC: 6.8 MG/DL (ref 4.2–8)

## 2019-09-04 PROCEDURE — 84100 ASSAY OF PHOSPHORUS: CPT

## 2019-09-04 PROCEDURE — 84153 ASSAY OF PSA TOTAL: CPT

## 2019-09-04 PROCEDURE — 80048 BASIC METABOLIC PNL TOTAL CA: CPT

## 2019-09-04 PROCEDURE — 83970 ASSAY OF PARATHORMONE: CPT

## 2019-09-04 PROCEDURE — 84550 ASSAY OF BLOOD/URIC ACID: CPT

## 2019-09-04 PROCEDURE — 36415 COLL VENOUS BLD VENIPUNCTURE: CPT

## 2019-09-05 RX ORDER — LISINOPRIL 10 MG/1
10 TABLET ORAL DAILY
Qty: 90 TABLET | Refills: 0 | Status: SHIPPED | OUTPATIENT
Start: 2019-09-05 | End: 2020-05-19

## 2019-09-05 RX ORDER — FLUOXETINE HYDROCHLORIDE 20 MG/1
20 CAPSULE ORAL 2 TIMES DAILY
Qty: 180 CAPSULE | Refills: 3 | Status: SHIPPED | OUTPATIENT
Start: 2019-09-05 | End: 2020-08-28 | Stop reason: SDUPTHER

## 2019-09-17 NOTE — PROGRESS NOTES
Problem List Items Addressed This Visit        Genitourinary    Prostate cancer [facility])     Irasema Kapadia is doing quite well, ECOG performance status of 0 indicating no detriment, PSA is undetectable, no evidence of disease  Not using any pads, he is continent, has not yet used phosphodiesterase 5 inhibitors, I again sent this to the pharmacy electronically  We will assess his sexual function using the phosphodiesterase 5 inhibitor, if not satisfactory we will move to Tri Mix therapy versus penile prosthesis         Relevant Medications    sildenafil (REVATIO) 20 mg tablet    Other Relevant Orders    PSA Total, Diagnostic    Nephrolithiasis - Primary     Patient with nonobstructing stone burden bilaterally on surveillance imaging, no flank pain at this time  Following with Nephrology as well  Plan:  Continue metabolic and metabolic stone interventions as per Nephrology, he will make us aware of any flank pain or stone symptoms that he may have in the future         Stage 3 chronic kidney disease ([facility] [address] )     Patient's baseline creatinine is around 1 19-1 2, recently was seen to be 1 4, patient states that he was taking ibuprofen prior to this blood work  He has stopped using this medication  Recommend he continue to follow with Nephrology and to avoid nephrotoxic meds                       Assessment and plan:       Please see problem oriented charting for the assessment plan of today's urological complaints    Karena Colvin MD      Chief Complaint     Chief Complaint   Patient presents with    Prostate Cancer         History of Present Illness     Key Sanchez is a 80 y o  gentleman with a history of intermediate risk prostate cancer status post radical prostatectomy on the 18th of March    PSA postoperatively has been undetectable less than 0 1, most recently earlier this month indicating no evidence of disease      In terms of his history of nephrolithiasis he has required surgical intervention for this previously  He has been following with nephrology as well in terms of hopefully decreasing future stone events        In terms of his pad count it is currently 0     In terms of his ECOG performance status it is currently  0 indicating excellent functioning    In terms of new complaints today he  has some erectile dysfunction, has not yet tried oral medications in which he is interested  I sent these medications to the pharmacy again           The following portions of the patient's history were reviewed and updated as appropriate: allergies, current medications, past family history, past medical history, past social history, past surgical history and problem list    Detailed Urologic History     - please refer to HPI    Review of Systems     Review of Systems   Constitutional: Negative  HENT: Negative  Eyes: Negative  Respiratory: Negative  Cardiovascular: Negative  Gastrointestinal: Negative  Endocrine: Negative  Genitourinary:        As per HPI   Musculoskeletal: Negative  Skin: Negative  Allergic/Immunologic: Negative  Neurological: Negative  Hematological: Negative  Psychiatric/Behavioral: Negative  Allergies     No Known Allergies    Physical Exam     Physical Exam   Constitutional: He is oriented to person, place, and time  He appears well-developed and well-nourished  No distress  HENT:   Head: Normocephalic and atraumatic  Right Ear: External ear normal    Left Ear: External ear normal    Eyes: Right eye exhibits no discharge  Left eye exhibits no discharge  Neck: No tracheal deviation present  Cardiovascular: Intact distal pulses  Pulmonary/Chest: Effort normal  No stridor  No respiratory distress  Abdominal: Soft  He exhibits no distension and no mass  There is no tenderness  There is no rebound and no guarding  No hernia     Well-healed incisions from prostatectomy, no eventration or hernia   Musculoskeletal: He exhibits no edema, tenderness or deformity  Neurological: He is alert and oriented to person, place, and time  No cranial nerve deficit  Coordination normal    Skin: Skin is warm and dry  No rash noted  He is not diaphoretic  No erythema  No pallor  Psychiatric: He has a normal mood and affect  His behavior is normal  Judgment and thought content normal    Nursing note and vitals reviewed            Vital Signs  Vitals:    09/18/19 1309   BP: 138/76   BP Location: Left arm   Patient Position: Sitting   Cuff Size: Standard   Pulse: 66   Weight: 84 4 kg (186 lb)   Height: 5' 9" (1 753 m)         Current Medications       Current Outpatient Medications:     co-enzyme Q-10 30 MG capsule, Take 200 mg by mouth daily, Disp: , Rfl:     FLUoxetine (PROzac) 20 mg capsule, Take 1 capsule (20 mg total) by mouth 2 (two) times a day for 334 days, Disp: 180 capsule, Rfl: 3    lisinopril (ZESTRIL) 10 mg tablet, Take 1 tablet (10 mg total) by mouth daily for 145 days, Disp: 90 tablet, Rfl: 0    sildenafil (REVATIO) 20 mg tablet, Take 2-3 tablets by mouth nightly, Disp: 60 tablet, Rfl: 6      Active Problems     Patient Active Problem List   Diagnosis    Essential hypertension    Hyperlipidemia, mixed    Prostate cancer ([facility] [address] )    Psoriasis    Nephrolithiasis    Stage 3 chronic kidney disease ([facility] [address] )         Past Medical History     Past Medical History:   Diagnosis Date    Arthritis     Cancer ([facility] [address] )     prostate    Hyperlipidemia     Hypertension     OCD (obsessive compulsive disorder)          Surgical History     Past Surgical History:   Procedure Laterality Date    FINGER AMPUTATION Left     partial middle and ring    FL RETROGRADE PYELOGRAM  4/5/2019    FL RETROGRADE PYELOGRAM  5/9/2019    KNEE SURGERY Right     menisectomy    WV CYSTO/URETERO W/LITHOTRIPSY &INDWELL STENT INSRT Left 4/5/2019    Procedure: CYSTOSCOPY URETEROSCOPY WITH  INSERTION STENT URETERAL;  Surgeon: Michel Bradley MD;  [facility];  Service: Urology    WV CYSTO/URETERO W/LITHOTRIPSY &INDWELL STENT INSRT Left 5/9/2019    Procedure: CYSTOSCOPY URETEROSCOPY WITH LITHOTRIPSY HOLMIUM LASER, RETROGRADE PYELOGRAM AND INSERTION STENT URETERAL;  Surgeon: Kalee Cassidy MD;  [facility];  Service: Urology    WV LAP,PROSTATECTOMY,RADICAL,W/NERVE SPARE,INCL ROBOTIC N/A 3/18/2019    Procedure: ROBOTIC RADICAL PROSTATECTOMY; BILATERAL PELVIC LYMPH NODE DISSECTION;  Surgeon: Kalee Cassidy MD;  [facility];  Service: Urology         Family History     Family History   Problem Relation Age of Onset    No Known Problems Mother     No Known Problems Father     Cancer Sister     No Known Problems Brother          Social History     Social History     Social History     Tobacco Use   Smoking Status Former Smoker   Smokeless Tobacco Never Used   Tobacco Comment    30+ years ago         Pertinent Lab Values     Lab Results   Component Value Date    CREATININE 1 49 (H) 09/04/2019       Lab Results   Component Value Date    PSA <0 1 09/04/2019    PSA <0 1 05/13/2019    PSA 6 8 (H) 01/17/2019             Pertinent Imaging      No new imaging for my review

## 2019-09-17 NOTE — PATIENT INSTRUCTIONS
Erectile Dysfunction   WHAT YOU NEED TO KNOW:   What is erectile dysfunction? Erectile dysfunction (ED), or impotence, is when you cannot get or keep an erection for sexual activity  What causes ED? · Conditions that lead to nerve problems, such as a spinal cord injury, diabetes, or stroke    · Hormone imbalances, such as low testosterone, high prolactin, or a thyroid disorder    · Medical conditions that decrease blood flow, such as high blood pressure and arteriosclerosis    · Multiple sclerosis or Parkinson disease    · Medicines, such as those used to treat depression and high blood pressure    · Injury to the testicles from radiation therapy to the testicles  What increases my risk for ED? · Obesity    · Diabetes that is not controlled    · Smoking, or drug or alcohol abuse    · An enlarged prostate    · Increasing age    · Spine or groin surgeries    · Stress, depression, relationship problems, and anxiety  How is ED diagnosed? Your healthcare provider will ask questions about your symptoms  He will also ask about any medical conditions you have and medicines you take  Your healthcare provider will examine your abdomen, penis, and testicles  A rectal exam may also be done to check for an enlarged prostate  Blood and urine tests are done to check for medical conditions that may have caused your ED  You may also need tests to check your blood flow and nerve function  How is ED treated? Treatment depends on the cause of your ED  You may need any of the following:  · ED medicines  help you have an erection  These medicines are taken before you have sex  Follow your healthcare provider's instructions on how to use these medicines  You may have a life-threatening reaction if you mix ED medicines with medicines that contain nitrates  Medicines with nitrates include nitroglycerin and other heart medicines  · Testosterone  may be given to increase the levels in your blood and improve your ED   You may need to use a skin cream or wear a patch  · Penis injections  may be done to help improve your blood flow  · A vacuum device  is a tube that is placed over the penis  A hand pump is connected to the tube and acts as a vacuum  This may help increase blood flow to the penis  · Therapy  may be needed to treat emotional or relationship problems that may be causing your ED  · Surgery  may be recommended if other treatments do not work  Surgery includes a penile implant or prosthesis  Surgery may also be done to improve blood flow  Ask for more information about surgeries for ED  How can I decrease my risk for ED? · Do not smoke  Smoking can cause ED  Nicotine and other chemicals in cigarettes and cigars can also cause lung damage  Ask your healthcare provider for information if you currently smoke and need help to quit  E-cigarettes or smokeless tobacco still contain nicotine  Talk to your healthcare provider before you use these products  · Control your blood sugar levels if you have diabetes  Over time, high blood sugar levels can cause ED  · Limit alcohol  Men should limit alcohol to 2 drinks a day  A drink of alcohol is 12 ounces of beer, 5 ounces of wine, or 1½ ounces of liquor  · Do not use illegal drugs  They increase your risk of ED  · Manage your medical conditions  Eat a variety of healthy foods and stay physically active  Take your medicines as directed  They can help control conditions that may cause ED  · Manage stress  Learn ways to relax, such as deep breathing, meditation, and listening to music  When should I seek immediate care? · You have chest pain, dizziness, or nausea after you take ED medicines or during or after sex  · You have an erection for more than 4 hours after you take your ED medicine  · You see blood in your urine  When should I contact my healthcare provider?    · You have changes in your vision, headaches, or back pain after you take your ED medicine  · You have a painful erection  · You have questions or concerns about your condition or care  CARE AGREEMENT:   You have the right to help plan your care  Learn about your health condition and how it may be treated  Discuss treatment options with your caregivers to decide what care you want to receive  You always have the right to refuse treatment  The above information is an  only  It is not intended as medical advice for individual conditions or treatments  Talk to your doctor, nurse or pharmacist before following any medical regimen to see if it is safe and effective for you  © 2017 [facility] Information is for End User's use only and may not be sold, redistributed or otherwise used for commercial purposes  All illustrations and images included in CareNotes® are the copyrighted property of A D A M [employer]  or [employer]

## 2019-09-18 ENCOUNTER — OFFICE VISIT (OUTPATIENT)
Dept: UROLOGY | Facility: CLINIC | Age: 69
End: 2019-09-18
Payer: COMMERCIAL

## 2019-09-18 VITALS
HEART RATE: 66 BPM | WEIGHT: 186 LBS | BODY MASS INDEX: 27.55 KG/M2 | SYSTOLIC BLOOD PRESSURE: 138 MMHG | DIASTOLIC BLOOD PRESSURE: 76 MMHG | HEIGHT: 69 IN

## 2019-09-18 DIAGNOSIS — N20.0 NEPHROLITHIASIS: Primary | ICD-10-CM

## 2019-09-18 DIAGNOSIS — N18.30 STAGE 3 CHRONIC KIDNEY DISEASE (HCC): ICD-10-CM

## 2019-09-18 DIAGNOSIS — C61 PROSTATE CANCER (HCC): ICD-10-CM

## 2019-09-18 PROCEDURE — 99214 OFFICE O/P EST MOD 30 MIN: CPT | Performed by: UROLOGY

## 2019-09-18 RX ORDER — SILDENAFIL CITRATE 20 MG/1
TABLET ORAL
Qty: 60 TABLET | Refills: 6 | Status: SHIPPED | OUTPATIENT
Start: 2019-09-18

## 2019-09-18 NOTE — ASSESSMENT & PLAN NOTE
Patient's baseline creatinine is around 1 19-1 2, recently was seen to be 1 4, patient states that he was taking ibuprofen prior to this blood work  He has stopped using this medication      Recommend he continue to follow with Nephrology and to avoid nephrotoxic meds

## 2019-09-18 NOTE — ASSESSMENT & PLAN NOTE
Irasema Kapadia is doing quite well, ECOG performance status of 0 indicating no detriment, PSA is undetectable, no evidence of disease  Not using any pads, he is continent, has not yet used phosphodiesterase 5 inhibitors, I again sent this to the pharmacy electronically      We will assess his sexual function using the phosphodiesterase 5 inhibitor, if not satisfactory we will move to Tri Mix therapy versus penile prosthesis

## 2019-09-18 NOTE — ASSESSMENT & PLAN NOTE
Patient with nonobstructing stone burden bilaterally on surveillance imaging, no flank pain at this time  Following with Nephrology as well      Plan:  Continue metabolic and metabolic stone interventions as per Nephrology, he will make us aware of any flank pain or stone symptoms that he may have in the future

## 2019-09-18 NOTE — LETTER
September 18, 2019     Pili Tavarez, [address]  [facility]  [address]    Patient: Arlene Kendall   YOB: 1950   Date of Visit: 9/18/2019       Dear Dr Krissy Shah:    Thank you for referring Lori Boyd to me for evaluation  Below are my notes for this consultation  If you have questions, please do not hesitate to call me  I look forward to following your patient along with you  Sincerely,        Conner Barajas MD        CC: MD Conner Rachel MD  9/18/2019  1:27 PM  Sign at close encounter       Problem List Items Addressed This Visit        Genitourinary    Prostate cancer [facility])     Jyoti Chi is doing quite well, ECOG performance status of 0 indicating no detriment, PSA is undetectable, no evidence of disease  Not using any pads, he is continent, has not yet used phosphodiesterase 5 inhibitors, I again sent this to the pharmacy electronically  We will assess his sexual function using the phosphodiesterase 5 inhibitor, if not satisfactory we will move to Tri Mix therapy versus penile prosthesis         Relevant Medications    sildenafil (REVATIO) 20 mg tablet    Other Relevant Orders    PSA Total, Diagnostic    Nephrolithiasis - Primary     Patient with nonobstructing stone burden bilaterally on surveillance imaging, no flank pain at this time  Following with Nephrology as well  Plan:  Continue metabolic and metabolic stone interventions as per Nephrology, he will make us aware of any flank pain or stone symptoms that he may have in the future         Stage 3 chronic kidney disease ([facility] [address] )     Patient's baseline creatinine is around 1 19-1 2, recently was seen to be 1 4, patient states that he was taking ibuprofen prior to this blood work  He has stopped using this medication      Recommend he continue to follow with Nephrology and to avoid nephrotoxic meds                       Assessment and plan:       Please see problem oriented charting for the assessment plan of today's urological complaints    Alexis Garcia MD      Chief Complaint     Chief Complaint   Patient presents with    Prostate Cancer         History of Present Illness     Zoie Cisneros is a 80 y o  gentleman with a history of intermediate risk prostate cancer status post radical prostatectomy on the 18th of March  PSA postoperatively has been undetectable less than 0 1, most recently earlier this month indicating no evidence of disease      In terms of his history of nephrolithiasis he has required surgical intervention for this previously  He has been following with nephrology as well in terms of hopefully decreasing future stone events        In terms of his pad count it is currently 0     In terms of his ECOG performance status it is currently   0 indicating excellent functioning    In terms of new complaints today he   has some erectile dysfunction, has not yet tried oral medications in which he is interested  I sent these medications to the pharmacy again           The following portions of the patient's history were reviewed and updated as appropriate: allergies, current medications, past family history, past medical history, past social history, past surgical history and problem list    Detailed Urologic History     - please refer to HPI    Review of Systems     Review of Systems   Constitutional: Negative  HENT: Negative  Eyes: Negative  Respiratory: Negative  Cardiovascular: Negative  Gastrointestinal: Negative  Endocrine: Negative  Genitourinary:        As per HPI   Musculoskeletal: Negative  Skin: Negative  Allergic/Immunologic: Negative  Neurological: Negative  Hematological: Negative  Psychiatric/Behavioral: Negative  Allergies     No Known Allergies    Physical Exam     Physical Exam   Constitutional: He is oriented to person, place, and time  He appears well-developed and well-nourished  No distress     HENT:   Head: Normocephalic and atraumatic  Right Ear: External ear normal    Left Ear: External ear normal    Eyes: Right eye exhibits no discharge  Left eye exhibits no discharge  Neck: No tracheal deviation present  Cardiovascular: Intact distal pulses  Pulmonary/Chest: Effort normal  No stridor  No respiratory distress  Abdominal: Soft  He exhibits no distension and no mass  There is no tenderness  There is no rebound and no guarding  No hernia  Well-healed incisions from prostatectomy, no eventration or hernia   Musculoskeletal: He exhibits no edema, tenderness or deformity  Neurological: He is alert and oriented to person, place, and time  No cranial nerve deficit  Coordination normal    Skin: Skin is warm and dry  No rash noted  He is not diaphoretic  No erythema  No pallor  Psychiatric: He has a normal mood and affect  His behavior is normal  Judgment and thought content normal    Nursing note and vitals reviewed            Vital Signs  Vitals:    09/18/19 1309   BP: 138/76   BP Location: Left arm   Patient Position: Sitting   Cuff Size: Standard   Pulse: 66   Weight: 84 4 kg (186 lb)   Height: 5' 9" (1 753 m)         Current Medications       Current Outpatient Medications:     co-enzyme Q-10 30 MG capsule, Take 200 mg by mouth daily, Disp: , Rfl:     FLUoxetine (PROzac) 20 mg capsule, Take 1 capsule (20 mg total) by mouth 2 (two) times a day for 334 days, Disp: 180 capsule, Rfl: 3    lisinopril (ZESTRIL) 10 mg tablet, Take 1 tablet (10 mg total) by mouth daily for 145 days, Disp: 90 tablet, Rfl: 0    sildenafil (REVATIO) 20 mg tablet, Take 2-3 tablets by mouth nightly, Disp: 60 tablet, Rfl: 6      Active Problems     Patient Active Problem List   Diagnosis    Essential hypertension    Hyperlipidemia, mixed    Pro[facility] cancer ([facility] [address] )    Psoriasis    Nephrolithiasis    [facility]         Past Medical History     Past Medical History:   Diagnosis Date    Arthritis     Cancer ([address] )     prostate    Hyperlipidemia     Hypertension     OCD (obsessive compulsive disorder)          Surgical History     Past Surgical History:   Procedure Laterality Date    FINGER AMPUTATION Left     partial middle and ring    FL RETROGRADE PYELOGRAM  4/5/2019    FL RETROGRADE PYELOGRAM  5/9/2019    KNEE SURGERY Right     menisectomy    NE CYSTO/URETERO W/LITHOTRIPSY &INDWELL STENT INSRT Left 4/5/2019    Procedure: CYSTOSCOPY URETEROSCOPY WITH  INSERTION STENT URETERAL;  Surgeon: Kayla Deutsch MD;  [facility];  Service: Urology    NE CYSTO/URETERO W/LITHOTRIPSY &INDWELL STENT INSRT Left 5/9/2019    Procedure: CYSTOSCOPY URETEROSCOPY WITH LITHOTRIPSY HOLMIUM LASER, RETROGRADE PYELOGRAM AND INSERTION STENT URETERAL;  Surgeon: Kayla Deutsch MD;  [facility];  Service: Urology    NE LAP,PROSTATECTOMY,RADICAL,W/NERVE SPARE,INCL ROBOTIC N/A 3/18/2019    Procedure: ROBOTIC RADICAL PROSTATECTOMY; BILATERAL PELVIC LYMPH NODE DISSECTION;  Surgeon: Kayla Deutsch MD;  [facility];  Service: Urology         Family History     Family History   Problem Relation Age of Onset    No Known Problems Mother     No Known Problems Father     Cancer Sister     No Known Problems Brother          Social History     Social History     Social History     Tobacco Use   Smoking Status Former Smoker   Smokeless Tobacco Never Used   Tobacco Comment    30+ years ago         Pertinent Lab Values     Lab Results   Component Value Date    CREATININE 1 49 (H) 09/04/2019       Lab Results   Component Value Date    PSA <0 1 09/04/2019    PSA <0 1 05/13/2019    PSA 6 8 (H) 01/17/2019             Pertinent Imaging      No new imaging for my review

## 2019-12-27 ENCOUNTER — OFFICE VISIT (OUTPATIENT)
Dept: URGENT CARE | Facility: CLINIC | Age: 69
End: 2019-12-27
Payer: COMMERCIAL

## 2019-12-27 VITALS
DIASTOLIC BLOOD PRESSURE: 76 MMHG | BODY MASS INDEX: 27.4 KG/M2 | SYSTOLIC BLOOD PRESSURE: 120 MMHG | HEART RATE: 73 BPM | WEIGHT: 185 LBS | OXYGEN SATURATION: 96 % | HEIGHT: 69 IN | RESPIRATION RATE: 18 BRPM | TEMPERATURE: 98.7 F

## 2019-12-27 DIAGNOSIS — J01.90 ACUTE SINUSITIS, RECURRENCE NOT SPECIFIED, UNSPECIFIED LOCATION: Primary | ICD-10-CM

## 2019-12-27 PROCEDURE — 99213 OFFICE O/P EST LOW 20 MIN: CPT | Performed by: PHYSICIAN ASSISTANT

## 2019-12-27 PROCEDURE — S9083 URGENT CARE CENTER GLOBAL: HCPCS | Performed by: PHYSICIAN ASSISTANT

## 2019-12-27 RX ORDER — DOXYCYCLINE 100 MG/1
100 TABLET ORAL 2 TIMES DAILY
Qty: 20 TABLET | Refills: 0 | Status: SHIPPED | OUTPATIENT
Start: 2019-12-27 | End: 2020-01-06

## 2019-12-27 NOTE — PROGRESS NOTES
[address] [facility]        NAME: Lencho Martínez is a 77 y o  male  : 1950    MRN: [id number]  DATE: 2019  TIME: 5:40 PM    Assessment and Plan   Acute sinusitis, recurrence not specified, unspecified location [J01 90]  1  Acute sinusitis, recurrence not specified, unspecified location  doxycycline (ADOXA) 100 MG tablet         Patient Instructions     Follow up with PCP in 3-5 days  Proceed to  ER if symptoms worsen  Chief Complaint     Chief Complaint   Patient presents with    Cough     Pt c/o cough, head congestion, bryan ear pressure, and bryan eye watering x 2 weeks  History of Present Illness       56-year-old male presents for evaluation of cough, head congestion and watery eyes  Patient states symptoms have been ongoing for approximately 2 weeks  Patient taking an over-the-counter cough suppressant with some relief of the cough  He describes the cough is worse in the morning and productive of sputum in the morning  Denies shortness of breath wheezing  Denies body aches  Denies recent travel  Review of Systems   Review of Systems   Constitutional: Negative for chills, fatigue and fever  HENT: Positive for congestion  Negative for ear pain, sinus pain, sore throat and trouble swallowing  Eyes: Negative for pain, discharge and redness  Respiratory: Positive for cough  Negative for chest tightness, shortness of breath and wheezing  Cardiovascular: Negative for chest pain, palpitations and leg swelling  Gastrointestinal: Negative for abdominal pain, diarrhea, nausea and vomiting  Musculoskeletal: Negative for arthralgias, joint swelling and myalgias  Skin: Negative for rash  Neurological: Negative for dizziness, weakness, numbness and headaches           Current Medications       Current Outpatient Medications:     co-enzyme Q-10 30 MG capsule, Take 200 mg by mouth daily, Disp: , Rfl:     FLUoxetine (PROzac) 20 mg capsule, Take 1 capsule (20 mg total) by mouth 2 (two) times a day for 334 days, Disp: 180 capsule, Rfl: 3    lisinopril (ZESTRIL) 10 mg tablet, Take 1 tablet (10 mg total) by mouth daily for 145 days, Disp: 90 tablet, Rfl: 0    doxycycline (ADOXA) 100 MG tablet, Take 1 tablet (100 mg total) by mouth 2 (two) times a day for 10 days, Disp: 20 tablet, Rfl: 0    sildenafil (REVATIO) 20 mg tablet, Take 2-3 tablets by mouth nightly (Patient not taking: Reported on 12/27/2019), Disp: 60 tablet, Rfl: 6    Current Allergies     Allergies as of 12/27/2019    (No Known Allergies)            The following portions of the patient's history were reviewed and updated as appropriate: allergies, current medications, past family history, past medical history, past social history, past surgical history and problem list      Past Medical History:   Diagnosis Date    Arthritis     Cancer ([address] )     prostate    Hyperlipidemia     Hypertension     OCD (obsessive compulsive disorder)        Past Surgical History:   Procedure Laterality Date    FINGER AMPUTATION Left     partial middle and ring    FL RETROGRADE PYELOGRAM  4/5/2019    FL RETROGRADE PYELOGRAM  5/9/2019    KNEE SURGERY Right     menisectomy    NM CYSTO/URETERO W/LITHOTRIPSY &INDWELL STENT INSRT Left 4/5/2019    Procedure: CYSTOSCOPY URETEROSCOPY WITH  INSERTION STENT URETERAL;  Surgeon: Shreya Thibodeaux MD;  [facility];  Service: Urology    NM CYSTO/URETERO W/LITHOTRIPSY &INDWELL STENT INSRT Left 5/9/2019    Procedure: CYSTOSCOPY URETEROSCOPY WITH LITHOTRIPSY HOLMIUM LASER, RETROGRADE PYELOGRAM AND INSERTION STENT URETERAL;  Surgeon: Shreya Thibodeaux MD;  [facility];  Service: Urology    NM LAP,PROSTATECTOMY,RADICAL,W/NERVE SPARE,INCL ROBOTIC N/A 3/18/2019    Procedure: ROBOTIC RADICAL PROSTATECTOMY; BILATERAL PELVIC LYMPH NODE DISSECTION;  Surgeon: Shreya Thibodeaux MD;  [facility];  Service: Urology       Family History   Problem Relation Age of Onset    No Known Problems Mother     No Known Problems Father     Cancer Sister     No Known Problems Brother          Medications have been verified  Objective   /76 (BP Location: Left arm, Patient Position: Sitting, Cuff Size: Standard)   Pulse 73   Temp 98 7 °F (37 1 °C) (Temporal)   Resp 18   Ht 5' 9" (1 753 m)   Wt 83 9 kg (185 lb)   SpO2 96%   BMI 27 32 kg/m²        Physical Exam     Physical Exam   Constitutional: He appears well-developed and well-nourished  HENT:   Head: Normocephalic  Right Ear: Hearing and tympanic membrane normal    Left Ear: Hearing and tympanic membrane normal    Nose: No mucosal edema  Mouth/Throat: Uvula is midline and mucous membranes are normal  Posterior oropharyngeal erythema present  Cardiovascular: Normal rate and regular rhythm  Pulmonary/Chest: Effort normal and breath sounds normal    Nursing note and vitals reviewed

## 2020-02-11 ENCOUNTER — TELEPHONE (OUTPATIENT)
Dept: UROLOGY | Facility: MEDICAL CENTER | Age: 70
End: 2020-02-11

## 2020-02-11 ENCOUNTER — APPOINTMENT (EMERGENCY)
Dept: CT IMAGING | Facility: HOSPITAL | Age: 70
End: 2020-02-11
Payer: COMMERCIAL

## 2020-02-11 ENCOUNTER — HOSPITAL ENCOUNTER (EMERGENCY)
Facility: HOSPITAL | Age: 70
Discharge: HOME | End: 2020-02-11
Attending: EMERGENCY MEDICINE | Admitting: EMERGENCY MEDICINE
Payer: COMMERCIAL

## 2020-02-11 DIAGNOSIS — N20.0 NEPHROLITHIASIS: Primary | ICD-10-CM

## 2020-02-11 LAB
ALBUMIN SERPL BCP-MCNC: 4.1 G/DL (ref 3.5–5)
ALP SERPL-CCNC: 89 U/L (ref 46–116)
ALT SERPL W P-5'-P-CCNC: 20 U/L (ref 12–78)
ANION GAP SERPL CALCULATED.3IONS-SCNC: 11 MMOL/L (ref 4–13)
AST SERPL W P-5'-P-CCNC: 17 U/L (ref 5–45)
BACTERIA UR QL AUTO: ABNORMAL /HPF
BASOPHILS # BLD AUTO: 0.03 THOUSANDS/ΜL (ref 0–0.1)
BASOPHILS NFR BLD AUTO: 0 % (ref 0–1)
BILIRUB SERPL-MCNC: 0.6 MG/DL (ref 0.2–1)
BILIRUB UR QL STRIP: ABNORMAL
BUN SERPL-MCNC: 17 MG/DL (ref 5–25)
CALCIUM SERPL-MCNC: 9.3 MG/DL (ref 8.3–10.1)
CHLORIDE SERPL-SCNC: 100 MMOL/L (ref 100–108)
CLARITY UR: ABNORMAL
CO2 SERPL-SCNC: 27 MMOL/L (ref 21–32)
COLOR UR: ABNORMAL
CREAT SERPL-MCNC: 1.54 MG/DL (ref 0.6–1.3)
EOSINOPHIL # BLD AUTO: 0 THOUSAND/ΜL (ref 0–0.61)
EOSINOPHIL NFR BLD AUTO: 0 % (ref 0–6)
ERYTHROCYTE [DISTWIDTH] IN BLOOD BY AUTOMATED COUNT: 12.7 % (ref 11.6–15.1)
GFR SERPL CREATININE-BSD FRML MDRD: 46 ML/MIN/1.73SQ M
GLUCOSE SERPL-MCNC: 130 MG/DL (ref 65–140)
GLUCOSE UR STRIP-MCNC: NEGATIVE MG/DL
HCT VFR BLD AUTO: 47.8 % (ref 36.5–49.3)
HGB BLD-MCNC: 16.1 G/DL (ref 12–17)
HGB UR QL STRIP.AUTO: ABNORMAL
IMM GRANULOCYTES # BLD AUTO: 0.06 THOUSAND/UL (ref 0–0.2)
IMM GRANULOCYTES NFR BLD AUTO: 0 % (ref 0–2)
KETONES UR STRIP-MCNC: ABNORMAL MG/DL
LEUKOCYTE ESTERASE UR QL STRIP: ABNORMAL
LIPASE SERPL-CCNC: 111 U/L (ref 73–393)
LYMPHOCYTES # BLD AUTO: 0.5 THOUSANDS/ΜL (ref 0.6–4.47)
LYMPHOCYTES NFR BLD AUTO: 3 % (ref 14–44)
MCH RBC QN AUTO: 29.6 PG (ref 26.8–34.3)
MCHC RBC AUTO-ENTMCNC: 33.7 G/DL (ref 31.4–37.4)
MCV RBC AUTO: 88 FL (ref 82–98)
MONOCYTES # BLD AUTO: 0.58 THOUSAND/ΜL (ref 0.17–1.22)
MONOCYTES NFR BLD AUTO: 4 % (ref 4–12)
NEUTROPHILS # BLD AUTO: 13.54 THOUSANDS/ΜL (ref 1.85–7.62)
NEUTS SEG NFR BLD AUTO: 93 % (ref 43–75)
NITRITE UR QL STRIP: POSITIVE
NON-SQ EPI CELLS URNS QL MICRO: ABNORMAL /HPF
NRBC BLD AUTO-RTO: 0 /100 WBCS
PH UR STRIP.AUTO: 5.5 [PH]
PLATELET # BLD AUTO: 272 THOUSANDS/UL (ref 149–390)
PMV BLD AUTO: 8.8 FL (ref 8.9–12.7)
POTASSIUM SERPL-SCNC: 4.3 MMOL/L (ref 3.5–5.3)
PROT SERPL-MCNC: 8.6 G/DL (ref 6.4–8.2)
PROT UR STRIP-MCNC: ABNORMAL MG/DL
RBC # BLD AUTO: 5.44 MILLION/UL (ref 3.88–5.62)
RBC #/AREA URNS AUTO: ABNORMAL /HPF
SODIUM SERPL-SCNC: 138 MMOL/L (ref 136–145)
SP GR UR STRIP.AUTO: 1.02 (ref 1–1.03)
UROBILINOGEN UR QL STRIP.AUTO: 1 E.U./DL
WBC # BLD AUTO: 14.71 THOUSAND/UL (ref 4.31–10.16)
WBC #/AREA URNS AUTO: ABNORMAL /HPF

## 2020-02-11 PROCEDURE — 74176 CT ABD & PELVIS W/O CONTRAST: CPT

## 2020-02-11 PROCEDURE — 80053 COMPREHEN METABOLIC PANEL: CPT | Performed by: EMERGENCY MEDICINE

## 2020-02-11 PROCEDURE — 81001 URINALYSIS AUTO W/SCOPE: CPT | Performed by: EMERGENCY MEDICINE

## 2020-02-11 PROCEDURE — 99285 EMERGENCY DEPT VISIT HI MDM: CPT | Performed by: PHYSICIAN ASSISTANT

## 2020-02-11 PROCEDURE — 85025 COMPLETE CBC W/AUTO DIFF WBC: CPT | Performed by: EMERGENCY MEDICINE

## 2020-02-11 PROCEDURE — 99284 EMERGENCY DEPT VISIT MOD MDM: CPT

## 2020-02-11 PROCEDURE — 83690 ASSAY OF LIPASE: CPT | Performed by: EMERGENCY MEDICINE

## 2020-02-11 PROCEDURE — 36415 COLL VENOUS BLD VENIPUNCTURE: CPT

## 2020-02-11 RX ORDER — TAMSULOSIN HYDROCHLORIDE 0.4 MG/1
0.4 CAPSULE ORAL
Qty: 10 CAPSULE | Refills: 0 | Status: SHIPPED | OUTPATIENT
Start: 2020-02-11 | End: 2020-06-04

## 2020-02-11 RX ORDER — ONDANSETRON 4 MG/1
4 TABLET, ORALLY DISINTEGRATING ORAL EVERY 8 HOURS PRN
Qty: 15 TABLET | Refills: 0 | Status: SHIPPED | OUTPATIENT
Start: 2020-02-11 | End: 2020-04-01

## 2020-02-11 RX ORDER — CEPHALEXIN 500 MG/1
500 CAPSULE ORAL EVERY 12 HOURS SCHEDULED
Qty: 14 CAPSULE | Refills: 0 | Status: SHIPPED | OUTPATIENT
Start: 2020-02-11 | End: 2020-02-18

## 2020-02-11 NOTE — ED PROVIDER NOTES
History  Chief Complaint   Patient presents with    Flank Pain     R sided flank pain onset today, + blood in urine     78 yo with right flank pain  Onset 0830 today  Sudden onset  Constant  Waxing and waning in severity  Migrated into left groin then had suprapubic pain with hematuria  Pain now resolved  Thinks he may have passed a kidney stone  Has h/o stones and this feels similar  No fever or chills, n/v        History provided by:  Patient   used: No    Flank Pain   Pain location:  R flank  Pain quality: sharp    Pain radiates to:  Suprapubic region and groin  Pain severity:  Severe  Onset quality:  Sudden  Duration:  8 hours  Timing:  Constant  Progression:  Resolved  Chronicity:  New  Relieved by:  Nothing  Worsened by:  Nothing  Ineffective treatments:  None tried  Associated symptoms: hematuria    Associated symptoms: no chest pain, no chills, no constipation, no cough, no diarrhea, no dysuria, no fatigue, no fever, no nausea, no shortness of breath, no sore throat and no vomiting        Prior to Admission Medications   Prescriptions Last Dose Informant Patient Reported? Taking?    FLUoxetine (PROzac) 20 mg capsule  Self No No   Sig: Take 1 capsule (20 mg total) by mouth 2 (two) times a day for 334 days   co-enzyme Q-10 30 MG capsule  Self Yes No   Sig: Take 200 mg by mouth daily   lisinopril (ZESTRIL) 10 mg tablet  Self No No   Sig: Take 1 tablet (10 mg total) by mouth daily for 145 days   sildenafil (REVATIO) 20 mg tablet   No No   Sig: Take 2-3 tablets by mouth nightly   Patient not taking: Reported on 12/27/2019      Facility-Administered Medications: None       Past Medical History:   Diagnosis Date    Arthritis     Cancer [address] [address]     prostate    Hyperlipidemia     Hypertension     OCD (obsessive compulsive disorder)        Past Surgical History:   Procedure Laterality Date    FINGER AMPUTATION Left     partial middle and ring    FL RETROGRADE PYELOGRAM  4/5/2019    FL RETROGRADE PYELOGRAM  5/9/2019    KNEE SURGERY Right     menisectomy    MT CYSTO/URETERO W/LITHOTRIPSY &INDWELL STENT INSRT Left 4/5/2019    Procedure: CYSTOSCOPY URETEROSCOPY WITH  INSERTION STENT URETERAL;  Surgeon: Jose R Vera MD;  [facility];  Service: Urology    MT CYSTO/URETERO W/LITHOTRIPSY &INDWELL STENT INSRT Left 5/9/2019    Procedure: CYSTOSCOPY URETEROSCOPY WITH LITHOTRIPSY HOLMIUM LASER, RETROGRADE PYELOGRAM AND INSERTION STENT URETERAL;  Surgeon: Jose R Vera MD;  [facility];  Service: Urology    MT LAP,PROSTATECTOMY,RADICAL,W/NERVE [address] ROBOTIC N/A 3/18/2019    Procedure: ROBOTIC RADICAL PROSTATECTOMY; BILATERAL PELVIC LYMPH NODE DISSECTION;  Surgeon: Jose R Vera MD;  [facility];  Service: Urology       Family History   Problem Relation Age of Onset    No Known Problems Mother     No Known Problems Father     Cancer Sister     No Known Problems Brother      I have reviewed and agree with the history as documented  Social History     Tobacco Use    Smoking status: Former Smoker    Smokeless tobacco: Never Used    Tobacco comment: 30+ years ago   Substance Use Topics    Alcohol use: No    Drug use: No       Review of Systems   Constitutional: Negative for activity change, appetite change, chills, diaphoresis, fatigue, fever and unexpected weight change  HENT: Negative for congestion, rhinorrhea, sinus pressure, sore throat and trouble swallowing  Eyes: Negative for photophobia and visual disturbance  Respiratory: Negative for apnea, cough, choking, chest tightness, shortness of breath, wheezing and stridor  Cardiovascular: Negative for chest pain, palpitations and leg swelling  Gastrointestinal: Negative for abdominal distention, abdominal pain, blood in stool, constipation, diarrhea, nausea and vomiting  Genitourinary: Positive for flank pain and hematuria   Negative for decreased urine volume, difficulty urinating, dysuria, enuresis, frequency and urgency  Musculoskeletal: Negative for arthralgias, myalgias, neck pain and neck stiffness  Skin: Negative for color change, pallor, rash and wound  Allergic/Immunologic: Negative  Neurological: Negative for dizziness, tremors, syncope, weakness, light-headedness, numbness and headaches  Hematological: Negative  Psychiatric/Behavioral: Negative  All other systems reviewed and are negative  Physical Exam  Physical Exam   Constitutional: He is oriented to person, place, and time  He appears well-developed and well-nourished  Non-toxic appearance  He does not have a sickly appearance  He does not appear ill  No distress  HENT:   Head: Normocephalic and atraumatic  Eyes: Pupils are equal, round, and reactive to light  EOM and lids are normal    Neck: Normal range of motion  Neck supple  Cardiovascular: Normal rate, regular rhythm, S1 normal, S2 normal, normal heart sounds, intact distal pulses and normal pulses  Exam reveals no gallop, no distant heart sounds, no friction rub and no decreased pulses  No murmur heard  Pulses:       Radial pulses are 2+ on the right side, and 2+ on the left side  Pulmonary/Chest: Effort normal and breath sounds normal  No accessory muscle usage  No apnea, no tachypnea and no bradypnea  No respiratory distress  He has no decreased breath sounds  He has no wheezes  He has no rhonchi  He has no rales  Abdominal: Soft  Normal appearance  He exhibits no distension  There is no tenderness  There is no rigidity, no rebound, no guarding and no CVA tenderness  Currently no tenderness  Musculoskeletal: Normal range of motion  He exhibits no edema, tenderness or deformity  Neurological: He is alert and oriented to person, place, and time  No cranial nerve deficit  GCS eye subscore is 4  GCS verbal subscore is 5  GCS motor subscore is 6  Skin: Skin is warm, dry and intact  No rash noted  He is not diaphoretic  No erythema   No pallor  Psychiatric: His speech is normal    Nursing note and vitals reviewed        Vital Signs  ED Triage Vitals   Temperature Pulse Respirations Blood Pressure SpO2   02/11/20 1524 02/11/20 1524 02/11/20 1524 02/11/20 1524 02/11/20 1524   98 4 °F (36 9 °C) 67 16 (!) 184/95 95 %      Temp Source Heart Rate Source Patient Position - Orthostatic VS BP Location FiO2 (%)   02/11/20 1524 02/11/20 1524 02/11/20 1524 02/11/20 1524 --   Oral Monitor Sitting Right arm       Pain Score       02/11/20 1631       1           Vitals:    02/11/20 1524   BP: (!) 184/95   Pulse: 67   Patient Position - Orthostatic VS: Sitting         Visual Acuity      ED Medications  Medications - No data to display    Diagnostic Studies  Results Reviewed     Procedure Component Value Units Date/Time    Urine Microscopic [[id number]]  (Abnormal) Collected:  02/11/20 1644    Lab Status:  Final result Specimen:  Urine, Clean Catch Updated:  02/11/20 1708     RBC, UA Innumerable /hpf      WBC, UA 0-1 /hpf      Epithelial Cells Occasional /hpf      Bacteria, UA Occasional /hpf     CBC and differential [[id number]]  (Abnormal) Collected:  02/11/20 1609    Lab Status:  Final result Specimen:  Blood from Arm, Left Updated:  02/11/20 1657     WBC 14 71 Thousand/uL      RBC 5 44 Million/uL      Hemoglobin 16 1 g/dL      Hematocrit 47 8 %      MCV 88 fL      MCH 29 6 pg      MCHC 33 7 g/dL      RDW 12 7 %      MPV 8 8 fL      Platelets 068 Thousands/uL      nRBC 0 /100 WBCs      Neutrophils Relative 93 %      Immat GRANS % 0 %      Lymphocytes Relative 3 %      Monocytes Relative 4 %      Eosinophils Relative 0 %      Basophils Relative 0 %      Neutrophils Absolute 13 54 Thousands/µL      Immature Grans Absolute 0 06 Thousand/uL      Lymphocytes Absolute 0 50 Thousands/µL      Monocytes Absolute 0 58 Thousand/µL      Eosinophils Absolute 0 00 Thousand/µL      Basophils Absolute 0 03 Thousands/µL     UA w Reflex to Microscopic w Reflex to Culture [[id number]]  (Abnormal) Collected:  02/11/20 1644    Lab Status:  Final result Specimen:  Urine, Clean Catch Updated:  02/11/20 1653     Color, UA Red     Clarity, UA Cloudy     Specific [place], UA 1 020     pH, UA 5 5     Leukocytes, UA Trace     Nitrite, UA Positive     Protein,  (2+) mg/dl      Glucose, UA Negative mg/dl      Ketones, UA Trace mg/dl      Urobilinogen, UA 1 0 E U /dl      Bilirubin, UA Small     Blood, UA Large    Comprehensive metabolic panel [[id number]]  (Abnormal) Collected:  02/11/20 1609    Lab Status:  Final result Specimen:  Blood from Arm, Left Updated:  02/11/20 1631     Sodium 138 mmol/L      Potassium 4 3 mmol/L      Chloride 100 mmol/L      CO2 27 mmol/L      ANION GAP 11 mmol/L      BUN 17 mg/dL      Creatinine 1 54 mg/dL      Glucose 130 mg/dL      Calcium 9 3 mg/dL      AST 17 U/L      ALT 20 U/L      Alkaline Phosphatase 89 U/L      Total Protein 8 6 g/dL      Albumin 4 1 g/dL      Total Bilirubin 0 60 mg/dL      eGFR 46 ml/min/1 73sq m     Narrative:       [place] guidelines for Chronic Kidney Disease (CKD):     Stage 1 with normal or high GFR (GFR > 90 mL/min/1 73 square meters)    Stage 2 Mild CKD (GFR = 60-89 mL/min/1 73 square meters)    Stage 3A Moderate CKD (GFR = 45-59 mL/min/1 73 square meters)    Stage 3B Moderate CKD (GFR = 30-44 mL/min/1 73 square meters)    Stage 4 Severe CKD (GFR = 15-29 mL/min/1 73 square meters)    Stage 5 End Stage CKD (GFR <15 mL/min/1 73 square meters)  Note: GFR calculation is accurate only with a steady state creatinine    Lipase [[id number]]  (Normal) Collected:  02/11/20 1609    Lab Status:  Final result Specimen:  Blood from Arm, Left Updated:  02/11/20 1631     Lipase 111 u/L                  CT renal stone study abdomen pelvis without contrast   Final Result by Mandi Barth MD (02/11 1739)         1  Mild right hydronephrosis and hydroureter    Obstructing 4 6 mm calculus at the ureterovesical junction  2   Stable nonobstructing 9 mm left renal calculus  Workstation performed: [id number]                    Procedures  Procedures         ED Course                               MDM  Number of Diagnoses or Management Options  Nephrolithiasis: new and requires workup  Diagnosis management comments: DDx including but not limited to: renal colic, pyelonephritis, UTI, GI etiology, appendicitis, diverticulitis, pancreatitis, cholecystitis, biliary colic  Plan: CT  Labs  UA  Dispo pending  Pain now resolved  Defer analgesia  Amount and/or Complexity of Data Reviewed  Clinical lab tests: ordered and reviewed  Tests in the radiology section of CPT®: ordered and reviewed  Discuss the patient with other providers: yes Ayd Grove PA-C (urology) )  Independent visualization of images, tracings, or specimens: yes    Risk of Complications, Morbidity, and/or Mortality  Presenting problems: moderate  Management options: low  General comments: 81-year-old male here with right flank pain  Pain now centrally resolved  Found have right-sided stone at the UVJ measuring about 4 6 mm  Mild hydroureter hydronephrosis  He is afebrile but does have a white count  Nitrite positive urine but only occasional bacteria in urine  He has CKD at baseline  Symptoms are now well controlled and he reports no subjective or objective fevers now our even prior to arrival   I discussed the case with the on-call urology group  After discussion with the PA who then discussed with the attending was agreed to have patient follow-up in office given no fever  Start him on Keflex per the recommendations  Flomax as well  He was reportedly supposed to be taking sildenafil which she has not been taking  Explained that he should not take both of these and only take 1  He will contact the urologist office for follow-up this week  I sent a message out to his urologist for follow-up as well    We discussed return parameters, particularly fever and he agrees and understands this  No pain at the time of discharge  I did offer analgesia but he declined  He states he would only like to take Tylenol at home  Patient Progress  Patient progress: stable        Disposition  Final diagnoses:   Nephrolithiasis - Right     Time reflects when diagnosis was documented in both MDM as applicable and the Disposition within this note     Time User Action Codes Description Comment    2/11/2020  5:57 PM Renetta North Add [N20 0] Nephrolithiasis     2/11/2020  5:57 PM Renetta North Modify [N20 0] Nephrolithiasis Right      ED Disposition     ED Disposition Condition Date/Time Comment    Discharge Stable Tue Feb 11, 2020  5:57 PM Alissa Encarnacion discharge to home/self care  Follow-up Information     Follow up With Specialties Details Why Trisha Mcqueen MD Urology Call  for appointment this week or next week Manny Dwyer [address] [place] [address]  [phone]            Patient's Medications   Discharge Prescriptions    CEPHALEXIN (KEFLEX) 500 MG CAPSULE    Take 1 capsule (500 mg total) by mouth every 12 (twelve) hours for 7 days       Start Date: 2/11/2020 End Date: 2/18/2020       Order Dose: 500 mg       Quantity: 14 capsule    Refills: 0    ONDANSETRON (ZOFRAN-ODT) 4 MG DISINTEGRATING TABLET    Take 1 tablet (4 mg total) by mouth every 8 (eight) hours as needed for nausea       Start Date: 2/11/2020 End Date: --       Order Dose: 4 mg       Quantity: 15 tablet    Refills: 0    TAMSULOSIN (FLOMAX) 0 4 MG    Take 1 capsule (0 4 mg total) by mouth daily with dinner for 10 days       Start Date: 2/11/2020 End Date: 2/21/2020       Order Dose: 0 4 mg       Quantity: 10 capsule    Refills: 0     No discharge procedures on file      PDMP Review     None          ED Provider  Electronically Signed by           Modesta Kolb PA-C  02/11/20 5034

## 2020-02-11 NOTE — TELEPHONE ENCOUNTER
Patient of Dr Cameron Quiroz seen in [facility]  Patient experiencing right flank pain for the last 2 hours  No other symptoms       He can be reached at [phone]

## 2020-02-11 NOTE — TELEPHONE ENCOUNTER
Patient managed by Rajan Smith at the [facility] office  Patient was last seen on 9/18/19  Patient is seen for nephrolithiasis and prostate cancer  Called and spoke to patient  Patient was calling reporting right flank pain  He reports that pain has now moved to the front  Patient reports that the pain stated around 8:30am  Patient reports that pain is currently a 2 and at its worse is about a 5 or 6 out of 10  Patient reports that nothing is aggravating  Patient reports that he is currently laying flat and that he feels better laying flat  Patient reports that pain is a constant annoying pain and reports that the pain is not sharp  Patient does report a little nausea  Patient denies any vomiting, fevers, chills, cold sweats, hematuria, or any urinary symptoms  Patient last had imaging on 6/6/19  Patient had KUB and US done at that time  KUB  IMPRESSION:     Bilateral nephrolithiasis largest lower pole of the left measures 8mm  US  IMPRESSION:  Nonobstructing bilateral renal calculi  Patient is aware that message will be sent to physician assistant for further recommendations  Should he go for imaging to check for stones?

## 2020-02-11 NOTE — TELEPHONE ENCOUNTER
Patient called to say he is now having blood in his urine and is headed to emergency room    He stated he will stop over to office first

## 2020-02-11 NOTE — TELEPHONE ENCOUNTER
Patient came to the [facility] office and said he called and left a message regarding kidney stone pain and he now has blood in his urine, he said he is going to the hospital to be seen by a

## 2020-02-12 NOTE — TELEPHONE ENCOUNTER
Schedule follow-up appointment   Message Contents   Tiffanie Mead MD  [facility] [address]             Please bring patient in to be seen in the coming week, can be with an advanced practitioner, to set up appropriate follow-up imaging/for surgery if needed    Previous Messages      ----- Message -----   From: Clarita Wiseman PA-C   Sent: 2/11/2020   6:01 PM EST   To: Tiffanie Mead MD     Hi Dr Julian Johnston,     I saw Andrzej Kim in the ER on 2/11 who you have seen in the past for nephrolithiasis  He has a right sided hydronephrosis and hydroureter   Obstructing 4 6 mm calculus at the ureterovesical junction  Did have nitrite positive urine with leukocytosis of 14  Only occasional bacteria on micro  Afebrile  Cr stable  Spoke with PA who then discussed with Dr Luh Phelps who recommended abx and outpatient follow up  Just wanted to reach out and give you a heads up  His pain was nearly resolved at time of discharge without any meds on board       Thanks,   Alfredito Etienne

## 2020-02-12 NOTE — TELEPHONE ENCOUNTER
Patient was discharged from the emergency department yesterday with a ureteral stone  Will need a follow-up appointment within the week to review  Thank you

## 2020-02-12 NOTE — TELEPHONE ENCOUNTER
Called and left message for patient to call the office back to schedule appointment  Office number was left in the message  When patient calls back please schedule an appointment within the next 1 week with an AP to review  Can use a same day/next day appointment spot

## 2020-02-13 ENCOUNTER — OFFICE VISIT (OUTPATIENT)
Dept: UROLOGY | Facility: CLINIC | Age: 70
End: 2020-02-13
Payer: COMMERCIAL

## 2020-02-13 VITALS
DIASTOLIC BLOOD PRESSURE: 80 MMHG | BODY MASS INDEX: 29.36 KG/M2 | SYSTOLIC BLOOD PRESSURE: 124 MMHG | HEART RATE: 67 BPM | HEIGHT: 69 IN | WEIGHT: 198.2 LBS

## 2020-02-13 DIAGNOSIS — N20.0 NEPHROLITHIASIS: Primary | ICD-10-CM

## 2020-02-13 LAB
SL AMB  POCT GLUCOSE, UA: NORMAL
SL AMB LEUKOCYTE ESTERASE,UA: NORMAL
SL AMB POCT BILIRUBIN,UA: NORMAL
SL AMB POCT BLOOD,UA: NORMAL
SL AMB POCT CLARITY,UA: CLEAR
SL AMB POCT COLOR,UA: YELLOW
SL AMB POCT KETONES,UA: NORMAL
SL AMB POCT NITRITE,UA: NORMAL
SL AMB POCT PH,UA: 5
SL AMB POCT SPECIFIC GRAVITY,UA: 1.01
SL AMB POCT URINE PROTEIN: NORMAL
SL AMB POCT UROBILINOGEN: NORMAL

## 2020-02-13 PROCEDURE — 1160F RVW MEDS BY RX/DR IN RCRD: CPT | Performed by: PHYSICIAN ASSISTANT

## 2020-02-13 PROCEDURE — 99213 OFFICE O/P EST LOW 20 MIN: CPT | Performed by: PHYSICIAN ASSISTANT

## 2020-02-13 PROCEDURE — 3008F BODY MASS INDEX DOCD: CPT | Performed by: PHYSICIAN ASSISTANT

## 2020-02-13 PROCEDURE — 3079F DIAST BP 80-89 MM HG: CPT | Performed by: PHYSICIAN ASSISTANT

## 2020-02-13 PROCEDURE — 1036F TOBACCO NON-USER: CPT | Performed by: PHYSICIAN ASSISTANT

## 2020-02-13 PROCEDURE — 3074F SYST BP LT 130 MM HG: CPT | Performed by: PHYSICIAN ASSISTANT

## 2020-02-13 PROCEDURE — 81002 URINALYSIS NONAUTO W/O SCOPE: CPT | Performed by: PHYSICIAN ASSISTANT

## 2020-02-13 NOTE — PROGRESS NOTES
1  Nephrolithiasis  POCT urine dip    US kidney and bladder         Assessment and plan:       1  Prostate cancer status post radical prostatectomy (03/18/2019) -  Managed by Dr Nat Thompson  - follow-up as scheduled in April with PSA prior to visit for continued surveillance    2  Nephrolithiasis   - was happy to hear that patient has successfully passed the stone yesterday  We will obtain a follow-up ultrasound of the kidney and bladder to ensure resolution of hydronephrosis prior to his follow-up appointment  - patient was counseled on proper hydration dietary modifications to minimize future stone formation  - patient was instructed to complete course of Keflex  If has any continued signs of urinary infection thereafter a culture will need to be obtained  - all questions answered  Ching Bonilla PA-C      Chief Complaint     F/u nephrolithiasis    History of Present Illness     Ronaldo Horner is a 80 y o   male patient of Dr Nat Thompson with a history of prostate cancer status post radical prostatectomy (03/18/2019) and nephrolithiasis presenting for follow-up  Patient's last PSA is undetectable at less than 0 1 (09/04/2019)  Has a follow-up appointment scheduled in April with a repeat PSA prior to visit     Ana Faith had contacted our this 2 days ago concerning with flank pain and hematuria  He is evaluated in the emergency department  A CT stone study was obtained confirming a 4 6 mm right UVJ stone with mild hydronephrosis  Stable nonobstructing 9 mm left renal calculus  Patient's urine was concerning for infection at that time and was started on Keflex and discharged home  He presents today for follow-up  Unfortunately patient visualized passage of his stone yesterday, 02/12/2020  Denies any residual flank pain or hematuria    Is still taking his Keflex and denies any dysuria, nausea, vomiting, fevers, or chills          Review of Systems     Review of Systems   Constitutional: Negative  HENT: Negative  Eyes: Negative  Respiratory: Negative  Cardiovascular: Negative  Gastrointestinal: Negative  Endocrine: Negative  Genitourinary:        As per HPI   Musculoskeletal: Negative  Skin: Negative  Allergic/Immunologic: Negative  Neurological: Negative  Hematological: Negative  Psychiatric/Behavioral: Negative  Allergies     No Known Allergies    Physical Exam     Physical Exam   Constitutional: He is oriented to person, place, and time  He appears well-developed and well-nourished  No distress  HENT:   Head: Normocephalic and atraumatic  Right Ear: External ear normal    Left Ear: External ear normal    Eyes: Right eye exhibits no discharge  Left eye exhibits no discharge  Neck: Normal range of motion  No tracheal deviation present  Cardiovascular: Intact distal pulses  Pulmonary/Chest: Effort normal  No stridor  No respiratory distress  Genitourinary:   Genitourinary Comments: No CVA tenderness   Musculoskeletal: Normal range of motion  He exhibits no edema, tenderness or deformity  Neurological: He is alert and oriented to person, place, and time  Skin: Skin is warm and dry  No rash noted  He is not diaphoretic  No erythema  No pallor  Psychiatric: He has a normal mood and affect  His behavior is normal  Judgment and thought content normal    Nursing note and vitals reviewed            Vital Signs  Vitals:    02/13/20 1019   BP: 124/80   BP Location: Left arm   Patient Position: Sitting   Cuff Size: Standard   Pulse: 67   Weight: 89 9 kg (198 lb 3 2 oz)   Height: 5' 9" (1 753 m)         Current Medications       Current Outpatient Medications:     cephalexin (KEFLEX) 500 mg capsule, Take 1 capsule (500 mg total) by mouth every 12 (twelve) hours for 7 days, Disp: 14 capsule, Rfl: 0    FLUoxetine (PROzac) 20 mg capsule, Take 1 capsule (20 mg total) by mouth 2 (two) times a day for 334 days, Disp: 180 capsule, Rfl: 3    lisinopril (ZESTRIL) 10 mg tablet, Take 1 tablet (10 mg total) by mouth daily for 145 days, Disp: 90 tablet, Rfl: 0    tamsulosin (FLOMAX) 0 4 mg, Take 1 capsule (0 4 mg total) by mouth daily with dinner for 10 days, Disp: 10 capsule, Rfl: 0    co-enzyme Q-10 30 MG capsule, Take 200 mg by mouth daily, Disp: , Rfl:     ondansetron (ZOFRAN-ODT) 4 mg disintegrating tablet, Take 1 tablet (4 mg total) by mouth every 8 (eight) hours as needed for nausea (Patient not taking: Reported on 2/13/2020), Disp: 15 tablet, Rfl: 0    sildenafil (REVATIO) 20 mg tablet, Take 2-3 tablets by mouth nightly (Patient not taking: Reported on 2/13/2020), Disp: 60 tablet, Rfl: 6      Active Problems     Patient Active Problem List   Diagnosis    Essential hypertension    Hyperlipidemia, mixed    [facility] ([facility] [address] )    Psoriasis    Nephrolithiasis    Stage 3 chronic kidney disease ([facility] [address] )         Past Medical History     Past Medical History:   Diagnosis Date    Arthritis     Cancer ([facility] [address] )     prostate    Hyperlipidemia     Hypertension     OCD (obsessive compulsive disorder)          Surgical History     Past Surgical History:   Procedure Laterality Date    FINGER AMPUTATION Left     partial middle and ring    FL RETROGRADE PYELOGRAM  4/5/2019    FL RETROGRADE PYELOGRAM  5/9/2019    KNEE SURGERY Right     menisectomy    MA CYSTO/URETERO W/LITHOTRIPSY &INDWELL STENT INSRT Left 4/5/2019    Procedure: CYSTOSCOPY URETEROSCOPY WITH  INSERTION STENT URETERAL;  Surgeon: Cely Luna MD;  Location: [facility];  Service: Urology    MA CYSTO/URETERO W/LITHOTRIPSY &INDWELL STENT INSRT Left 5/9/2019    Procedure: CYSTOSCOPY URETEROSCOPY WITH LITHOTRIPSY HOLMIUM LASER, RETROGRADE PYELOGRAM AND INSERTION STENT URETERAL;  Surgeon: Cely Luna MD;  Location: [facility];  Service: Urology    MA LAP,PROSTATECTOMY,RADICAL,W/NERVE SPARE,INCL ROBOTIC N/A 3/18/2019    Procedure: ROBOTIC RADICAL PROSTATECTOMY; BILATERAL PELVIC LYMPH NODE DISSECTION;  Surgeon: Nita Palma MD;  [facility];  Service: Urology         Family History     Family History   Problem Relation Age of Onset    No Known Problems Mother     No Known Problems Father     Cancer Sister     No Known Problems Brother          Social History     Social History     Social History     Tobacco Use   Smoking Status Former Smoker   Smokeless Tobacco Never Used   Tobacco Comment    30+ years ago         Pertinent Lab Values     Lab Results   Component Value Date    CREATININE 1 54 (H) 02/11/2020       Lab Results   Component Value Date    PSA <0 1 09/04/2019    PSA <0 1 05/13/2019    PSA 6 8 (H) 01/17/2019             Pertinent Imaging      No new imaging for my review

## 2020-02-18 VITALS
DIASTOLIC BLOOD PRESSURE: 95 MMHG | HEART RATE: 67 BPM | BODY MASS INDEX: 29.27 KG/M2 | TEMPERATURE: 98.4 F | SYSTOLIC BLOOD PRESSURE: 184 MMHG | WEIGHT: 198.19 LBS | OXYGEN SATURATION: 95 % | RESPIRATION RATE: 16 BRPM

## 2020-03-28 ENCOUNTER — TELEPHONE (OUTPATIENT)
Dept: OTHER | Facility: OTHER | Age: 70
End: 2020-03-28

## 2020-03-28 NOTE — TELEPHONE ENCOUNTER
Pt  Got message about his appointment on 4/3 and he said he would rather put it off until he can see someone in person  I did not cancel it yet because I figured you would call back

## 2020-03-31 NOTE — TELEPHONE ENCOUNTER
I called and left message for pt to c/b to sched appt - pt did not want to do virtual visit  appt w for 6 mth f/u w PSA  Did say [facility] office is currently closed - we are scheduling appts starting in June

## 2020-04-01 ENCOUNTER — TELEPHONE (OUTPATIENT)
Dept: UROLOGY | Facility: CLINIC | Age: 70
End: 2020-04-01

## 2020-05-13 ENCOUNTER — TELEPHONE (OUTPATIENT)
Dept: UROLOGY | Facility: CLINIC | Age: 70
End: 2020-05-13

## 2020-05-19 DIAGNOSIS — I10 ESSENTIAL HYPERTENSION: ICD-10-CM

## 2020-05-19 RX ORDER — LISINOPRIL 10 MG/1
TABLET ORAL
Qty: 30 TABLET | Refills: 0 | Status: SHIPPED | OUTPATIENT
Start: 2020-05-19 | End: 2020-06-12 | Stop reason: SDUPTHER

## 2020-05-29 ENCOUNTER — APPOINTMENT (OUTPATIENT)
Dept: LAB | Facility: MEDICAL CENTER | Age: 70
End: 2020-05-29
Payer: COMMERCIAL

## 2020-05-29 DIAGNOSIS — C61 PROSTATE CANCER (HCC): ICD-10-CM

## 2020-05-29 LAB — PSA SERPL-MCNC: <0.1 NG/ML (ref 0–4)

## 2020-05-29 PROCEDURE — 84153 ASSAY OF PSA TOTAL: CPT

## 2020-06-04 ENCOUNTER — OFFICE VISIT (OUTPATIENT)
Dept: UROLOGY | Facility: CLINIC | Age: 70
End: 2020-06-04
Payer: COMMERCIAL

## 2020-06-04 VITALS
TEMPERATURE: 98.4 F | HEART RATE: 72 BPM | HEIGHT: 68 IN | DIASTOLIC BLOOD PRESSURE: 84 MMHG | SYSTOLIC BLOOD PRESSURE: 136 MMHG | BODY MASS INDEX: 29.4 KG/M2 | WEIGHT: 194 LBS

## 2020-06-04 DIAGNOSIS — N18.30 STAGE 3 CHRONIC KIDNEY DISEASE (HCC): ICD-10-CM

## 2020-06-04 DIAGNOSIS — N20.0 NEPHROLITHIASIS: Primary | ICD-10-CM

## 2020-06-04 DIAGNOSIS — C61 PROSTATE CANCER (HCC): ICD-10-CM

## 2020-06-04 PROCEDURE — 99213 OFFICE O/P EST LOW 20 MIN: CPT | Performed by: UROLOGY

## 2020-06-04 PROCEDURE — 3075F SYST BP GE 130 - 139MM HG: CPT | Performed by: UROLOGY

## 2020-06-04 PROCEDURE — 3008F BODY MASS INDEX DOCD: CPT | Performed by: UROLOGY

## 2020-06-04 PROCEDURE — 1160F RVW MEDS BY RX/DR IN RCRD: CPT | Performed by: UROLOGY

## 2020-06-04 PROCEDURE — 1036F TOBACCO NON-USER: CPT | Performed by: UROLOGY

## 2020-06-04 PROCEDURE — 3079F DIAST BP 80-89 MM HG: CPT | Performed by: UROLOGY

## 2020-06-12 DIAGNOSIS — I10 ESSENTIAL HYPERTENSION: ICD-10-CM

## 2020-06-12 RX ORDER — LISINOPRIL 10 MG/1
10 TABLET ORAL DAILY
Qty: 30 TABLET | Refills: 0 | Status: SHIPPED | OUTPATIENT
Start: 2020-06-12 | End: 2020-07-18 | Stop reason: SDUPTHER

## 2020-07-18 DIAGNOSIS — I10 ESSENTIAL HYPERTENSION: ICD-10-CM

## 2020-07-20 RX ORDER — LISINOPRIL 10 MG/1
10 TABLET ORAL DAILY
Qty: 30 TABLET | Refills: 0 | Status: SHIPPED | OUTPATIENT
Start: 2020-07-20 | End: 2020-08-19 | Stop reason: SDUPTHER

## 2020-08-19 DIAGNOSIS — I10 ESSENTIAL HYPERTENSION: ICD-10-CM

## 2020-08-19 RX ORDER — LISINOPRIL 10 MG/1
10 TABLET ORAL DAILY
Qty: 30 TABLET | Refills: 0 | Status: SHIPPED | OUTPATIENT
Start: 2020-08-19 | End: 2020-09-16 | Stop reason: SDUPTHER

## 2020-08-28 DIAGNOSIS — F42.8 OTHER OBSESSIVE-COMPULSIVE DISORDERS: ICD-10-CM

## 2020-08-30 RX ORDER — FLUOXETINE HYDROCHLORIDE 20 MG/1
20 CAPSULE ORAL 2 TIMES DAILY
Qty: 180 CAPSULE | Refills: 0 | Status: SHIPPED | OUTPATIENT
Start: 2020-08-30 | End: 2020-11-30 | Stop reason: SDUPTHER

## 2020-09-15 DIAGNOSIS — I10 ESSENTIAL HYPERTENSION: ICD-10-CM

## 2020-09-16 DIAGNOSIS — I10 ESSENTIAL HYPERTENSION: ICD-10-CM

## 2020-09-16 RX ORDER — LISINOPRIL 10 MG/1
10 TABLET ORAL DAILY
Qty: 30 TABLET | Refills: 0 | OUTPATIENT
Start: 2020-09-16

## 2020-09-16 RX ORDER — LISINOPRIL 10 MG/1
10 TABLET ORAL DAILY
Qty: 30 TABLET | Refills: 0 | Status: SHIPPED | OUTPATIENT
Start: 2020-09-16 | End: 2020-10-09 | Stop reason: SDUPTHER

## 2020-09-22 PROBLEM — Z00.00 MEDICARE ANNUAL WELLNESS VISIT, SUBSEQUENT: Status: ACTIVE | Noted: 2018-10-04

## 2020-09-22 PROBLEM — Z11.59 NEED FOR HEPATITIS C SCREENING TEST: Status: ACTIVE | Noted: 2020-09-22

## 2020-09-22 NOTE — PROGRESS NOTES
Assessment/Plan:       Diagnoses and all orders for this visit:    Essential hypertension  -     Comprehensive metabolic panel; Future    Prostate cancer ([facility] [address] )    Stage 3 chronic kidney disease (HCC)    Hyperlipidemia, mixed  -     Lipid panel; Future    Medicare annual wellness visit, subsequent    Need for hepatitis C screening test  -     Hepatitis C antibody; Future    BMI 28 0-28 9,adult        No problem-specific Assessment & Plan notes found for this encounter  Subjective:      Patient ID: Shania Adkins is a 77 y o  male  Follow up and Medicare Wellness    HTn-stable  CKD III-stable creatinine at baseline  Prostate Cancer-follows with Urologist  PSA monitored every 6 months  HLD-he is due for labs  The following portions of the patient's history were reviewed and updated as appropriate:   He has a past medical history of Arthritis, Cancer ([facility] [address] ), Hyperlipidemia, Hypertension, and OCD (obsessive compulsive disorder)  ,  does not have any pertinent problems on file  ,   has a past surgical history that includes Knee surgery (Right); Finger amputation (Left); pr lap,prostatectomy,radical,w/nerve spare,incl robotic (N/A, 3/18/2019); FL retrograde pyelogram (4/5/2019); pr cysto/uretero w/lithotripsy &indwell stent insrt (Left, 4/5/2019); pr cysto/uretero w/lithotripsy &indwell stent insrt (Left, 5/9/2019); and FL retrograde pyelogram (5/9/2019)  ,  family history includes Cancer in his sister; No Known Problems in his brother, father, and mother  ,   reports that he has quit smoking  He has never used smokeless tobacco  He reports that he does not drink alcohol or use drugs  ,  has No Known Allergies     Current Outpatient Medications   Medication Sig Dispense Refill    co-enzyme Q-10 30 MG capsule Take 200 mg by mouth daily      FLUoxetine (PROzac) 20 mg capsule Take 1 capsule (20 mg total) by mouth 2 (two) times a day 180 capsule 0    lisinopril (ZESTRIL) 10 mg tablet Take 1 tablet (10 mg total) by mouth daily 30 tablet 0    sildenafil (REVATIO) 20 mg tablet Take 2-3 tablets by mouth nightly (Patient not taking: Reported on 2/13/2020) 60 tablet 6     No current facility-administered medications for this visit  Review of Systems   Constitutional: Negative for fatigue and fever  HENT: Negative for congestion  Eyes: Negative for visual disturbance  Respiratory: Negative for cough and shortness of breath  Cardiovascular: Negative for chest pain, palpitations and leg swelling  Gastrointestinal: Negative for abdominal distention and abdominal pain  Endocrine: Negative for cold intolerance, polydipsia and polyuria  Genitourinary: Negative for difficulty urinating and hematuria  Musculoskeletal: Negative for back pain and joint swelling  Skin: Negative for color change and rash  Allergic/Immunologic: Negative for immunocompromised state  Neurological: Negative for dizziness and headaches  Hematological: Negative for adenopathy  Psychiatric/Behavioral: Negative for behavioral problems and sleep disturbance  All other systems reviewed and are negative  Objective:  Vitals:    09/24/20 0827   BP: 120/78   BP Location: Left arm   Patient Position: Sitting   Pulse: 88   Resp: 18   Temp: (!) 97 °F (36 1 °C)   SpO2: 97%   Weight: 85 7 kg (189 lb)   Height: 5' 8" (1 727 m)     Body mass index is 28 74 kg/m²  Physical Exam  Vitals signs and nursing note reviewed  Constitutional:       General: He is not in acute distress  Appearance: Normal appearance  He is not ill-appearing, toxic-appearing or diaphoretic  HENT:      Head: Normocephalic and atraumatic  Right Ear: Tympanic membrane, ear canal and external ear normal       Left Ear: Tympanic membrane, ear canal and external ear normal       Nose: Nose normal  No congestion  Mouth/Throat:      Mouth: Mucous membranes are moist       Pharynx: Oropharynx is clear   No oropharyngeal exudate or posterior oropharyngeal erythema  Eyes:      Conjunctiva/sclera: Conjunctivae normal       Pupils: Pupils are equal, round, and reactive to light  Neck:      Musculoskeletal: Normal range of motion and neck supple  Vascular: No carotid bruit  Cardiovascular:      Rate and Rhythm: Normal rate and regular rhythm  Pulses: Normal pulses  Heart sounds: Normal heart sounds  No murmur  No friction rub  No gallop  Pulmonary:      Effort: Pulmonary effort is normal  No respiratory distress  Breath sounds: Normal breath sounds  No stridor  No wheezing, rhonchi or rales  Chest:      Chest wall: No tenderness  Abdominal:      General: Bowel sounds are normal  There is no distension  Palpations: Abdomen is soft  There is no mass  Tenderness: There is no abdominal tenderness  Musculoskeletal: Normal range of motion  General: No swelling, tenderness, deformity or signs of injury  Lymphadenopathy:      Cervical: No cervical adenopathy  Skin:     General: Skin is warm and dry  Capillary Refill: Capillary refill takes less than 2 seconds  Coloration: Skin is not jaundiced or pale  Findings: No erythema or rash  Neurological:      Mental Status: He is alert  Cranial Nerves: No cranial nerve deficit  Sensory: No sensory deficit  Coordination: Coordination normal    Psychiatric:         Mood and Affect: Mood normal          Behavior: Behavior normal          Thought Content:  Thought content normal          Judgment: Judgment normal          Answers for HPI/ROS submitted by the patient on 9/21/2020   How would you rate your overall health?: very good  Compared to last year, how is your physical health?: slightly better  Compared to last year, how is your eyesight?: same  Compared to last year, how is your hearing?: same  Compared to last year, how is your emotional/mental health?: same  In the past 7 days, how much pain have you experienced?: none  In the past 6 months, have you lost or gained 10 pounds without trying?: No  One or more falls in the last year: No  In the past 6 months, have you accidentally leaked urine?: No  Do you have trouble with the stairs inside or outside your home?: No  Does your home have working smoke alarms?: Yes  Does your home have a carbon monoxide monitor?: Yes  Which safety hazards (if any) have you experienced in your home? Please select all that apply : none  How would you describe your current diet? Please select all that apply : Low Carb, Limited junk food  In addition to prescription medications, are you taking any over-the-counter supplements?: No  Can you manage your medications?: Yes  Can you walk and transfer into and out of your bed and chair?: Yes  Can you dress and groom yourself?: Yes  Can you bathe or shower yourself?: Yes  Can you feed yourself?: Yes  Can you do your laundry/ housekeeping?: Yes  Can you manage your money, pay your bills, and track your expenses?: Yes  Can you make your own meals?: Yes  Can you do your own shopping?: Yes  Within the last 12 months, have you had any hospitalizations or Emergency Department visits?: No  Do you have a living will?: No  Do you have a Durable POA (Power of ) for healthcare decisions?: No  Do you have an Advanced Directive for end of life decisions?: No    BMI Counseling: Body mass index is 28 74 kg/m²  The BMI is above normal  Nutrition recommendations include reducing portion sizes, decreasing overall calorie intake, 3-5 servings of fruits/vegetables daily, reducing fast food intake, consuming healthier snacks, decreasing soda and/or juice intake, moderation in carbohydrate intake, increasing intake of lean protein, reducing intake of saturated fat and trans fat and reducing intake of cholesterol  Exercise recommendations include exercising 3-5 times per week and strength training exercises

## 2020-09-24 ENCOUNTER — OFFICE VISIT (OUTPATIENT)
Dept: FAMILY MEDICINE CLINIC | Facility: CLINIC | Age: 70
End: 2020-09-24
Payer: COMMERCIAL

## 2020-09-24 VITALS
HEIGHT: 68 IN | TEMPERATURE: 97 F | RESPIRATION RATE: 18 BRPM | SYSTOLIC BLOOD PRESSURE: 120 MMHG | WEIGHT: 189 LBS | DIASTOLIC BLOOD PRESSURE: 78 MMHG | OXYGEN SATURATION: 97 % | BODY MASS INDEX: 28.64 KG/M2 | HEART RATE: 88 BPM

## 2020-09-24 DIAGNOSIS — Z11.59 NEED FOR HEPATITIS C SCREENING TEST: ICD-10-CM

## 2020-09-24 DIAGNOSIS — E78.2 HYPERLIPIDEMIA, MIXED: ICD-10-CM

## 2020-09-24 DIAGNOSIS — I10 ESSENTIAL HYPERTENSION: Primary | ICD-10-CM

## 2020-09-24 DIAGNOSIS — C61 PROSTATE CANCER (HCC): ICD-10-CM

## 2020-09-24 DIAGNOSIS — Z00.00 MEDICARE ANNUAL WELLNESS VISIT, SUBSEQUENT: ICD-10-CM

## 2020-09-24 DIAGNOSIS — N18.30 STAGE 3 CHRONIC KIDNEY DISEASE (HCC): ICD-10-CM

## 2020-09-24 PROCEDURE — 1160F RVW MEDS BY RX/DR IN RCRD: CPT | Performed by: NURSE PRACTITIONER

## 2020-09-24 PROCEDURE — 3288F FALL RISK ASSESSMENT DOCD: CPT | Performed by: NURSE PRACTITIONER

## 2020-09-24 PROCEDURE — 1101F PT FALLS ASSESS-DOCD LE1/YR: CPT | Performed by: NURSE PRACTITIONER

## 2020-09-24 PROCEDURE — 3725F SCREEN DEPRESSION PERFORMED: CPT | Performed by: NURSE PRACTITIONER

## 2020-09-24 PROCEDURE — 3078F DIAST BP <80 MM HG: CPT | Performed by: NURSE PRACTITIONER

## 2020-09-24 PROCEDURE — 1170F FXNL STATUS ASSESSED: CPT | Performed by: NURSE PRACTITIONER

## 2020-09-24 PROCEDURE — 99213 OFFICE O/P EST LOW 20 MIN: CPT | Performed by: NURSE PRACTITIONER

## 2020-09-24 PROCEDURE — 1125F AMNT PAIN NOTED PAIN PRSNT: CPT | Performed by: NURSE PRACTITIONER

## 2020-09-24 PROCEDURE — 1036F TOBACCO NON-USER: CPT | Performed by: NURSE PRACTITIONER

## 2020-09-24 PROCEDURE — G0439 PPPS, SUBSEQ VISIT: HCPCS | Performed by: NURSE PRACTITIONER

## 2020-09-24 NOTE — PATIENT INSTRUCTIONS
Follow up and Medicare Wellness    HTn-stable  CKD III-stable creatinine at baseline  Prostate Cancer-follows with Urologist  PSA monitored every 6 months  HLD-he is due for labs  Follow up in office in 6 months  Medicare Preventive Visit Patient Instructions  Thank you for completing your Welcome to Medicare Visit or Medicare Annual Wellness Visit today  Your next wellness visit will be due in one year (9/24/2021)  The screening/preventive services that you may require over the next 5-10 years are detailed below  Some tests may not apply to you based off risk factors and/or age  Screening tests ordered at today's visit but not completed yet may show as past due  Also, please note that scanned in results may not display below  Preventive Screenings:  Service Recommendations Previous Testing/Comments   Colorectal Cancer Screening  · Colonoscopy    · Fecal Occult Blood Test (FOBT)/Fecal Immunochemical Test (FIT)  · Fecal DNA/Cologuard Test  · Flexible Sigmoidoscopy Age: 54-65 years old   Colonoscopy: every 10 years (May be performed more frequently if at higher risk)  OR  FOBT/FIT: every 1 year  OR  Cologuard: every 3 years  OR  Sigmoidoscopy: every 5 years  Screening may be recommended earlier than age 48 if at higher risk for colorectal cancer  Also, an individualized decision between you and your healthcare provider will decide whether screening between the ages of 74-80 would be appropriate   Colonoscopy: Not on file  FOBT/FIT: Not on file  Cologuard: Not on file  Sigmoidoscopy: Not on file         Prostate Cancer Screening Individualized decision between patient and health care provider in men between ages of 53-78   Medicare will cover every 12 months beginning on the day after your 50th birthday PSA: <0 1 ng/mL     History Prostate Cancer     Hepatitis C Screening Once for adults born between [facility]  More frequently in patients at high risk for Hepatitis C Hep C Antibody: Not on file Diabetes Screening 1-2 times per year if you're at risk for diabetes or have pre-diabetes Fasting glucose: 90 mg/dL   A1C: 5 5    Screening Current   Cholesterol Screening Once every 5 years if you don't have a lipid disorder  May order more often based on risk factors  Lipid panel: 10/04/2018    Screening Not Indicated  History Lipid Disorder      Other Preventive Screenings Covered by Medicare:  1  Abdominal Aortic Aneurysm (AAA) Screening: covered once if your at risk  You're considered to be at risk if you have a family history of AAA or a male between the age of 73-68 who smoking at least 100 cigarettes in your lifetime  2  Lung Cancer Screening: covers low dose CT scan once per year if you meet all of the following conditions: (1) Age 50-69; (2) No signs or symptoms of lung cancer; (3) Current smoker or have quit smoking within the last 15 years; (4) You have a tobacco smoking history of at least 30 pack years (packs per day x number of years you smoked); (5) You get a written order from a healthcare provider  3  Glaucoma Screening: covered annually if you're considered high risk: (1) You have diabetes OR (2) Family history of glaucoma OR (3)  aged 48 and older OR (3)  American aged 72 and older  3  Osteoporosis Screening: covered every 2 years if you meet one of the following conditions: (1) Have a vertebral abnormality; (2) On glucocorticoid therapy for more than 3 months; (3) Have primary hyperparathyroidism; (4) On osteoporosis medications and need to assess response to drug therapy  5  HIV Screening: covered annually if you're between the age of 12-76  Also covered annually if you are younger than 13 and older than 72 with risk factors for HIV infection  For pregnant patients, it is covered up to 3 times per pregnancy      Immunizations:  Immunization Recommendations   Influenza Vaccine Annual influenza vaccination during flu season is recommended for all persons aged >= 6 months who do not have contraindications   Pneumococcal Vaccine (Prevnar and Pneumovax)  * Prevnar = PCV13  * Pneumovax = PPSV23 Adults 25-60 years old: 1-3 doses may be recommended based on certain risk factors  Adults 72 years old: Prevnar (PCV13) vaccine recommended followed by Pneumovax (PPSV23) vaccine  If already received PPSV23 since turning 65, then PCV13 recommended at least one year after PPSV23 dose  Hepatitis B Vaccine 3 dose series if at intermediate or high risk (ex: diabetes, end stage renal disease, liver disease)   Tetanus (Td) Vaccine - COST NOT COVERED BY MEDICARE PART B Following completion of primary series, a booster dose should be given every 10 years to maintain immunity against tetanus  Td may also be given as tetanus wound prophylaxis  Tdap Vaccine - COST NOT COVERED BY MEDICARE PART B Recommended at least once for all adults  For pregnant patients, recommended with each pregnancy  Shingles Vaccine (Shingrix) - COST NOT COVERED BY MEDICARE PART B  2 shot series recommended in those aged 48 and above     Health Maintenance Due:      Topic Date Due    Hepatitis C Screening  1950     Immunizations Due:      Topic Date Due    Pneumococcal Vaccine: 65+ Years (1 of 1 - PPSV23) 10/07/2016    Influenza Vaccine  07/01/2020     Advance Directives   What are advance directives? Advance directives are legal documents that state your wishes and plans for medical care  These plans are made ahead of time in case you lose your ability to make decisions for yourself  Advance directives can apply to any medical decision, such as the treatments you want, and if you want to donate organs  What are the types of advance directives? There are many types of advance directives, and each state has rules about how to use them  You may choose a combination of any of the following:  · Living will: This is a written record of the treatment you want   You can also choose which treatments you do not want, which to limit, and which to stop at a certain time  This includes surgery, medicine, IV fluid, and tube feedings  · Durable power of  for healthcare [facility]): This is a written record that states who you want to make healthcare choices for you when you are unable to make them for yourself  This person, called a proxy, is usually a family member or a friend  You may choose more than 1 proxy  · Do not resuscitate (DNR) order:  A DNR order is used in case your heart stops beating or you stop breathing  It is a request not to have certain forms of treatment, such as CPR  A DNR order may be included in other types of advance directives  · Medical directive: This covers the care that you want if you are in a coma, near death, or unable to make decisions for yourself  You can list the treatments you want for each condition  Treatment may include pain medicine, surgery, blood transfusions, dialysis, IV or tube feedings, and a ventilator (breathing machine)  · Values history: This document has questions about your views, beliefs, and how you feel and think about life  This information can help others choose the care that you would choose  Why are advance directives important? An advance directive helps you control your care  Although spoken wishes may be used, it is better to have your wishes written down  Spoken wishes can be misunderstood, or not followed  Treatments may be given even if you do not want them  An advance directive may make it easier for your family to make difficult choices about your care  Weight Management   Why it is important to manage your weight:  Being overweight increases your risk of health conditions such as heart disease, high blood pressure, type 2 diabetes, and certain types of cancer  It can also increase your risk for osteoarthritis, sleep apnea, and other respiratory problems  Aim for a slow, steady weight loss   Even a small amount of weight loss can lower your risk of health problems  How to lose weight safely:  A safe and healthy way to lose weight is to eat fewer calories and get regular exercise  You can lose up about 1 pound a week by decreasing the number of calories you eat by 500 calories each day  Healthy meal plan for weight management:  A healthy meal plan includes a variety of foods, contains fewer calories, and helps you stay healthy  A healthy meal plan includes the following:  · Eat whole-grain foods more often  A healthy meal plan should contain fiber  Fiber is the part of grains, fruits, and vegetables that is not broken down by your body  Whole-grain foods are healthy and provide extra fiber in your diet  Some examples of whole-grain foods are whole-wheat breads and pastas, oatmeal, brown rice, and bulgur  · Eat a variety of vegetables every day  Include dark, leafy greens such as spinach, kale, carlene greens, and mustard greens  Eat yellow and orange vegetables such as carrots, sweet potatoes, and winter squash  · Eat a variety of fruits every day  Choose fresh or canned fruit (canned in its own juice or light syrup) instead of juice  Fruit juice has very little or no fiber  · Eat low-fat dairy foods  Drink fat-free (skim) milk or 1% milk  Eat fat-free yogurt and low-fat cottage cheese  Try low-fat cheeses such as mozzarella and other reduced-fat cheeses  · Choose meat and other protein foods that are low in fat  Choose beans or other legumes such as split peas or lentils  Choose fish, skinless poultry (chicken or turkey), or lean cuts of red meat (beef or pork)  Before you cook meat or poultry, cut off any visible fat  · Use less fat and oil  Try baking foods instead of frying them  Add less fat, such as margarine, sour cream, regular salad dressing and mayonnaise to foods  Eat fewer high-fat foods  Some examples of high-fat foods include french fries, doughnuts, ice cream, and cakes  · Eat fewer sweets    Limit foods and drinks that are high in sugar  This includes candy, cookies, regular soda, and sweetened drinks  Exercise:  Exercise at least 30 minutes per day on most days of the week  Some examples of exercise include walking, biking, dancing, and swimming  You can also fit in more physical activity by taking the stairs instead of the elevator or parking farther away from stores  Ask your healthcare provider about the best exercise plan for you  © Copyright [employer] 2018 Information is for End User's use only and may not be sold, redistributed or otherwise used for commercial purposes  All illustrations and images included in CareNotes® are the copyrighted property of [employer]  or [facility] Daniel Chirinos   Weight Management   AMBULATORY CARE:   Why it is important to manage your weight:  Being overweight increases your risk of health conditions such as heart disease, high blood pressure, type 2 diabetes, and certain types of cancer  It can also increase your risk for osteoarthritis, sleep apnea, and other respiratory problems  Aim for a slow, steady weight loss  Even a small amount of weight loss can lower your risk of health problems  How to lose weight safely:  A safe and healthy way to lose weight is to eat fewer calories and get regular exercise  You can lose up about 1 pound a week by decreasing the number of calories you eat by 500 calories each day  You can decrease calories by eating smaller portion sizes or by cutting out high-calorie foods  Read labels to find out how many calories are in the foods you eat  You can also burn calories with exercise such as walking, swimming, or biking  You will be more likely to keep weight off if you make these changes part of your lifestyle  Healthy meal plan for weight management:  A healthy meal plan includes a variety of foods, contains fewer calories, and helps you stay healthy  A healthy meal plan includes the following:  · Eat whole-grain foods more often    A healthy meal plan should contain fiber  Fiber is the part of grains, fruits, and vegetables that is not broken down by your body  Whole-grain foods are healthy and provide extra fiber in your diet  Some examples of whole-grain foods are whole-wheat breads and pastas, oatmeal, brown rice, and bulgur  · Eat a variety of vegetables every day  Include dark, leafy greens such as spinach, kale, carlene greens, and mustard greens  Eat yellow and orange vegetables such as carrots, sweet potatoes, and winter squash  · Eat a variety of fruits every day  Choose fresh or canned fruit (canned in its own juice or light syrup) instead of juice  Fruit juice has very little or no fiber  · Eat low-fat dairy foods  Drink fat-free (skim) milk or 1% milk  Eat fat-free yogurt and low-fat cottage cheese  Try low-fat cheeses such as mozzarella and other reduced-fat cheeses  · Choose meat and other protein foods that are low in fat  Choose beans or other legumes such as split peas or lentils  Choose fish, skinless poultry (chicken or turkey), or lean cuts of red meat (beef or pork)  Before you cook meat or poultry, cut off any visible fat  · Use less fat and oil  Try baking foods instead of frying them  Add less fat, such as margarine, sour cream, regular salad dressing and mayonnaise to foods  Eat fewer high-fat foods  Some examples of high-fat foods include french fries, doughnuts, ice cream, and cakes  · Eat fewer sweets  Limit foods and drinks that are high in sugar  This includes candy, cookies, regular soda, and sweetened drinks  Ways to decrease calories:   · Eat smaller portions  ¨ Use a small plate with smaller servings  ¨ Do not eat second helpings  ¨ When you eat at a restaurant, ask for a box and place half of your meal in the box before you eat  ¨ Share an entrée with someone else  · Replace high-calorie snacks with healthy, low-calorie snacks       ¨ Choose fresh fruit, vegetables, fat-free rice cakes, or air-popped popcorn instead of potato chips, nuts, or chocolate  ¨ Choose water or calorie-free drinks instead of soda or sweetened drinks  · Eat regular meals  Skipping meals can lead to overeating later in the day  Eat a healthy snack in place of a meal if you do not have time to eat a regular meal      · Do not shop for groceries when you are hungry  You may be more likely to make unhealthy food choices  Take a grocery list of healthy foods and shop after you have eaten  Exercise:  Exercise at least 30 minutes per day on most days of the week  Some examples of exercise include walking, biking, dancing, and swimming  You can also fit in more physical activity by taking the stairs instead of the elevator or parking farther away from stores  Ask your healthcare provider about the best exercise plan for you  Other things to consider as you try to lose weight:   · Be aware of situations that may give you the urge to overeat, such as eating while watching television  Find ways to avoid these situations  For example, read a book, go for a walk, or do crafts  · Meet with a weight loss support group or friends who are also trying to lose weight  This may help you stay motivated to continue working on your weight loss goals  © 2017 [employer]  Information is for End User's use only and may not be sold, redistributed or otherwise used for commercial purposes  All illustrations and images included in CareNotes® are the copyrighted property of [employer]  or Juliano Rockwell  The above information is an  only  It is not intended as medical advice for individual conditions or treatments  Talk to your doctor, nurse or pharmacist before following any medical regimen to see if it is safe and effective for you  Low Fat Diet   AMBULATORY CARE:   A low-fat diet  is an eating plan that is low in total fat, unhealthy fat, and cholesterol   You may need to follow a low-fat diet if you have trouble digesting or absorbing fat  You may also need to follow this diet if you have high cholesterol  You can also lower your cholesterol by increasing the amount of fiber in your diet  Soluble fiber is a type of fiber that helps to decrease cholesterol levels  Different types of fat in food:   · Limit unhealthy fats  A diet that is high in cholesterol, saturated fat, and trans fat may cause unhealthy cholesterol levels  Unhealthy cholesterol levels increase your risk of heart disease  ¨ Cholesterol:  Limit intake of cholesterol to less than 200 mg per day  Cholesterol is found in meat, eggs, and dairy  ¨ Saturated fat:  Limit saturated fat to less than 7% of your total daily calories  Ask your dietitian how many calories you need each day  Saturated fat is found in butter, cheese, ice cream, whole milk, and palm oil  Saturated fat is also found in meat, such as beef, pork, chicken skin, and processed meats  Processed meats include sausage, hot dogs, and bologna  ¨ Trans fat:  Avoid trans fat as much as possible  Trans fat is used in fried and baked foods  Foods that say trans fat free on the label may still have up to 0 5 grams of trans fat per serving  · Include healthy fats  Replace foods that are high in saturated and trans fat with foods high in healthy fats  This may help to decrease high cholesterol levels  ¨ Monounsaturated fats: These are found in avocados, nuts, and vegetable oils, such as olive, canola, and sunflower oil  ¨ Polyunsaturated fats: These can be found in vegetable oils, such as soybean or corn oil  Omega-3 fats can help to decrease the risk of heart disease  Omega-3 fats are found in fish, such as salmon, herring, trout, and tuna  Omega-3 fats can also be found in plant foods, such as walnuts, flaxseed, soybeans, and canola oil    Foods to limit or avoid:   · Grains:      ¨ Snacks that are made with partially hydrogenated oils, such as chips, regular crackers, and butter-flavored popcorn    ¨ High-fat baked goods, such as biscuits, croissants, doughnuts, pies, cookies, and pastries    · Dairy:      ¨ Whole milk, 2% milk, and yogurt and ice cream made with whole milk    ¨ Half and half creamer, heavy cream, and whipping cream    ¨ Cheese, cream cheese, and sour cream    · Meats and proteins:      ¨ High-fat cuts of meat (T-bone steak, regular hamburger, and ribs)    ¨ Fried meat, poultry (turkey and chicken), and fish    ¨ Poultry (chicken and turkey) with skin    ¨ Cold cuts (salami or bologna), hot dogs, george, and sausage    ¨ Whole eggs and egg yolks    · Vegetables and fruits with added fat:      ¨ Fried vegetables or vegetables in butter or high-fat sauces, such as cream or cheese sauces    ¨ Fried fruit or fruit served with butter or cream    · Fats:      ¨ Butter, stick margarine, and shortening    ¨ Coconut, palm oil, and palm kernel oil  Foods to include:   · Grains:      ¨ Whole-grain breads, cereals, pasta, and brown rice    ¨ Low-fat crackers and pretzels    · Vegetables and fruits:      ¨ Fresh, frozen, or canned vegetables (no salt or low-sodium)    ¨ Fresh, frozen, dried, or canned fruit (canned in light syrup or fruit juice)    ¨ Avocado    · Low-fat dairy products:      ¨ Nonfat (skim) or 1% milk    ¨ Nonfat or low-fat cheese, yogurt, and cottage cheese    · Meats and proteins:      ¨ Chicken or turkey with no skin    ¨ Baked or broiled fish    ¨ Lean beef and pork (loin, round, extra lean hamburger)    ¨ Beans and peas, unsalted nuts, soy products    ¨ Egg whites and substitutes    ¨ Seeds and nuts    · Fats:      ¨ Unsaturated oil, such as canola, olive, peanut, soybean, or sunflower oil    ¨ Soft or liquid margarine and vegetable oil spread    ¨ Low-fat salad dressing  Other ways to decrease fat:   · Read food labels before you buy foods  Choose foods that have less than 30% of calories from fat   Choose low-fat or fat-free dairy products  Remember that fat free does not mean calorie free  These foods still contain calories, and too many calories can lead to weight gain  · Trim fat from meat and avoid fried food  Trim all visible fat from meat before you cook it  Remove the skin from poultry  Do not [place] meat, fish, or poultry  Bake, roast, boil, or broil these foods instead  Avoid fried foods  Eat a baked potato instead of [place] fries  Steam vegetables instead of sautéing them in butter  · Add less fat to foods  Use imitation george bits on salads and baked potatoes instead of regular george bits  Use fat-free or low-fat salad dressings instead of regular dressings  Use low-fat or nonfat butter-flavored topping instead of regular butter or margarine on popcorn and other foods  Ways to decrease fat in recipes:  Replace high-fat ingredients with low-fat or nonfat ones  This may cause baked goods to be drier than usual  You may need to use nonfat cooking spray on pans to prevent food from sticking  You also may need to change the amount of other ingredients, such as water, in the recipe  Try the following:  · Use low-fat or light margarine instead of regular margarine or shortening  · Use lean ground turkey breast or chicken, or lean ground beef (less than 5% fat) instead of hamburger  · Add 1 teaspoon of canola oil to 8 ounces of skim milk instead of using cream or half and half  · Use grated zucchini, carrots, or apples in breads instead of coconut  · Use blenderized, low-fat cottage cheese, plain tofu, or low-fat ricotta cheese instead of cream cheese  · Use 1 egg white and 1 teaspoon of canola oil, or use ¼ cup (2 ounces) of fat-free egg substitute instead of a whole egg  · Replace half of the oil that is called for in a recipe with applesauce when you bake  Use 3 tablespoons of cocoa powder and 1 tablespoon of canola oil instead of a square of baking chocolate    How to increase fiber:  Eat enough high-fiber foods to get 20 to 30 grams of fiber every day  Slowly increase your fiber intake to avoid stomach cramps, gas, and other problems  · Eat 3 ounces of whole-grain foods each day  An ounce is about 1 slice of bread  Eat whole-grain breads, such as whole-wheat bread  Whole wheat, whole-wheat flour, or other whole grains should be listed as the first ingredient on the food label  Replace white flour with whole-grain flour or use half of each in recipes  Whole-grain flour is heavier than white flour, so you may have to add more yeast or baking powder  · Eat a high-fiber cereal for breakfast   Oatmeal is a good source of soluble fiber  Look for cereals that have bran or fiber in the name  Choose whole-grain products, such as brown rice, barley, and whole-wheat pasta  · Eat more beans, peas, and lentils  For example, add beans to soups or salads  Eat at least 5 cups of fruits and vegetables each day  Eat fruits and vegetables with the peel because the peel is high in fiber  © 2017 [facility] Information is for End User's use only and may not be sold, redistributed or otherwise used for commercial purposes  All illustrations and images included in CareNotes® are the copyrighted property of [employer]  or Juliano Rockwell  The above information is an  only  It is not intended as medical advice for individual conditions or treatments  Talk to your doctor, nurse or pharmacist before following any medical regimen to see if it is safe and effective for you

## 2020-09-24 NOTE — PROGRESS NOTES
Assessment and Plan:     Problem List Items Addressed This Visit        Cardiovascular and Mediastinum    Essential hypertension - Primary    Relevant Orders    Comprehensive metabolic panel       Genitourinary    Prostate cancer ([facility] )    Stage 3 chronic kidney disease ([facility] )       Other    Medicare annual wellness visit, subsequent    Hyperlipidemia, mixed    Relevant Orders    Lipid panel    Need for hepatitis C screening test    Relevant Orders    Hepatitis C antibody      Other Visit Diagnoses     BMI 28 0-28 9,adult            BMI Counseling: Body mass index is 28 74 kg/m²  The BMI is above normal  Nutrition recommendations include decreasing portion sizes, encouraging healthy choices of fruits and vegetables, decreasing fast food intake, consuming healthier snacks, limiting drinks that contain sugar, moderation in carbohydrate intake, increasing intake of lean protein, reducing intake of saturated and trans fat and reducing intake of cholesterol  Exercise recommendations include exercising 3-5 times per week and strength training exercises  No pharmacotherapy was ordered  Preventive health issues were discussed with patient, and age appropriate screening tests were ordered as noted in patient's After Visit Summary  Personalized health advice and appropriate referrals for health education or preventive services given if needed, as noted in patient's After Visit Summary       History of Present Illness:     Patient presents for Medicare Annual Wellness visit    Patient Care Team:  Sadiq Rios as PCP - General (Nurse Practitioner)  Valarie Mejias MD (Urology)  Claude Wilson MD as Consulting Physician (Nephrology)     Problem List:     Patient Active Problem List   Diagnosis    Medicare annual wellness visit, subsequent    Essential hypertension    Hyperlipidemia, mixed    Prostate cancer ([facility] )    Psoriasis    Nephrolithiasis    Stage 3 chronic kidney disease ([facility] )    Need for hepatitis C screening test      Past Medical and Surgical History:     Past Medical History:   Diagnosis Date    Arthritis     Cancer ([address] )     prostate    Hyperlipidemia     Hypertension     OCD (obsessive compulsive disorder)      Past Surgical History:   Procedure Laterality Date    FINGER AMPUTATION Left     partial middle and ring    FL RETROGRADE PYELOGRAM  4/5/2019    FL RETROGRADE PYELOGRAM  5/9/2019    KNEE SURGERY Right     menisectomy    DE CYSTO/URETERO W/LITHOTRIPSY &INDWELL STENT INSRT Left 4/5/2019    Procedure: CYSTOSCOPY URETEROSCOPY WITH  INSERTION STENT URETERAL;  Surgeon: Fallon Solis MD;  [facility];  Service: Urology    DE CYSTO/URETERO W/LITHOTRIPSY &INDWELL STENT INSRT Left 5/9/2019    Procedure: CYSTOSCOPY URETEROSCOPY WITH LITHOTRIPSY HOLMIUM LASER, RETROGRADE PYELOGRAM AND INSERTION STENT URETERAL;  Surgeon: Fallon Solis MD;  [facility];  Service: Urology    DE LAP,PROSTATECTOMY,RADICAL,W/NERVE [address] ROBOTIC N/A 3/18/2019    Procedure: ROBOTIC RADICAL PROSTATECTOMY; BILATERAL PELVIC LYMPH NODE DISSECTION;  Surgeon: Fallon Solis MD;  [facility];  Service: Urology      Family History:     Family History   Problem Relation Age of Onset    No Known Problems Mother     No Known Problems Father     Cancer Sister     No Known Problems Brother       Social History:        Social History     Socioeconomic History    Marital status: /Civil Union     Spouse name: None    Number of children: None    Years of education: None    Highest education level: None   Occupational History    None   Social Needs    Financial resource strain: None    Food insecurity     Worry: None     Inability: None    Transportation needs     Medical: None     Non-medical: None   Tobacco Use    Smoking status: Former Smoker    Smokeless tobacco: Never Used    Tobacco comment: 30+ years ago   Substance and Sexual Activity    Alcohol use: No    Drug use:  No  Sexual activity: None   Lifestyle    Physical activity     Days per week: None     Minutes per session: None    Stress: None   Relationships    Social connections     Talks on phone: None     Gets together: None     Attends Holiness service: None     Active member of club or organization: None     Attends meetings of clubs or organizations: None     Relationship status: None    Intimate partner violence     Fear of current or ex partner: None     Emotionally abused: None     Physically abused: None     Forced sexual activity: None   Other Topics Concern    None   Social History Narrative    None      Medications and Allergies:     Current Outpatient Medications   Medication Sig Dispense Refill    co-enzyme Q-10 30 MG capsule Take 200 mg by mouth daily      FLUoxetine (PROzac) 20 mg capsule Take 1 capsule (20 mg total) by mouth 2 (two) times a day 180 capsule 0    lisinopril (ZESTRIL) 10 mg tablet Take 1 tablet (10 mg total) by mouth daily 30 tablet 0    sildenafil (REVATIO) 20 mg tablet Take 2-3 tablets by mouth nightly (Patient not taking: Reported on 2/13/2020) 60 tablet 6     No current facility-administered medications for this visit  No Known Allergies   Immunizations:     Immunization History   Administered Date(s) Administered    Tdap 07/31/2017      Health Maintenance:         Topic Date Due    Hepatitis C Screening  1950     There are no preventive care reminders to display for this patient  Medicare Health Risk Assessment:     /78 (BP Location: Left arm, Patient Position: Sitting)   Pulse 88   Temp (!) 97 °F (36 1 °C)   Resp 18   Ht 5' 8" (1 727 m)   Wt 85 7 kg (189 lb)   SpO2 97%   BMI 28 74 kg/m²      Prince Vera is here for his Subsequent Wellness visit  Health Risk Assessment:   Patient rates overall health as very good  Patient feels that their physical health rating is slightly better  Eyesight was rated as same  Hearing was rated as same   Patient feels that their emotional and mental health rating is same  Pain experienced in the last 7 days has been none  Patient states that he has experienced no weight loss or gain in last 6 months  Depression Screening:   PHQ-2 Score: 0      Fall Risk Screening: In the past year, patient has experienced: no history of falling in past year      Home Safety:  Patient does not have trouble with stairs inside or outside of their home  Patient has working smoke alarms and has working carbon monoxide detector  Home safety hazards include: none  Nutrition:   Current diet is Low Carb and Limited junk food  Medications:   Patient is not currently taking any over-the-counter supplements  Patient is able to manage medications  Activities of Daily Living (ADLs)/Instrumental Activities of Daily Living (IADLs):   Walk and transfer into and out of bed and chair?: Yes  Dress and groom yourself?: Yes    Bathe or shower yourself?: Yes    Feed yourself?  Yes  Do your laundry/housekeeping?: Yes  Manage your money, pay your bills and track your expenses?: Yes  Make your own meals?: Yes    Do your own shopping?: Yes    Previous Hospitalizations:   Any hospitalizations or ED visits within the last 12 months?: No      Advance Care Planning:   Living will: No    Durable POA for healthcare: No    Advanced directive: No    Advanced directive counseling given: Yes    Five wishes given: Yes    Patient declined ACP directive: No    End of Life Decisions reviewed with patient: Yes    Provider agrees with end of life decisions: Yes      Cognitive Screening:   Provider or family/friend/caregiver concerned regarding cognition?: No    PREVENTIVE SCREENINGS      Cardiovascular Screening:    General: Screening Not Indicated, History Lipid Disorder and Risks and Benefits Discussed    Due for: Lipid Panel      Diabetes Screening:     General: Screening Current and Risks and Benefits Discussed    Due for: Blood Glucose      Colorectal Cancer Screening: General: Screening Not Indicated and Patient Declines      Prostate Cancer Screening:    General: History Prostate Cancer and Screening Current      Osteoporosis Screening:    General: Screening Not Indicated      Abdominal Aortic Aneurysm (AAA) Screening:    Risk factors include: age between 73-67 yo and tobacco use        General: Screening Not Indicated      Lung Cancer Screening:     General: Screening Not Indicated    Other Counseling Topics:   Car/seat belt/driving safety, skin self-exam, sunscreen and regular weightbearing exercise         OTILIA Jones

## 2020-09-27 ENCOUNTER — NURSE TRIAGE (OUTPATIENT)
Dept: OTHER | Facility: OTHER | Age: 70
End: 2020-09-27

## 2020-09-27 DIAGNOSIS — Z20.828 SARS-ASSOCIATED CORONAVIRUS EXPOSURE: ICD-10-CM

## 2020-09-27 DIAGNOSIS — Z20.828 SARS-ASSOCIATED CORONAVIRUS EXPOSURE: Primary | ICD-10-CM

## 2020-09-27 PROCEDURE — U0003 INFECTIOUS AGENT DETECTION BY NUCLEIC ACID (DNA OR RNA); SEVERE ACUTE RESPIRATORY SYNDROME CORONAVIRUS 2 (SARS-COV-2) (CORONAVIRUS DISEASE [COVID-19]), AMPLIFIED PROBE TECHNIQUE, MAKING USE OF HIGH THROUGHPUT TECHNOLOGIES AS DESCRIBED BY CMS-2020-01-R: HCPCS | Performed by: NURSE PRACTITIONER

## 2020-09-27 NOTE — TELEPHONE ENCOUNTER
Reason for Disposition   [1] COVID-19 EXPOSURE (Close Contact) within last 14 days AND [2] NO cough, fever, or breathing difficulty    Answer Assessment - Initial Assessment Questions  1  CLOSE CONTACT: "Who is the person with the confirmed or suspected COVID-19 infection that you were exposed to?"      [facility] TEST  2  PLACE of CONTACT: "Where were you when you were exposed to COVID-19?" (e g , home, school, medical waiting room; which city?)     UNSURE  3  TYPE of CONTACT: "How much contact was there?" (e g , sitting next to, live in same house, work in same office, same building)     UNSURE  4  DURATION of CONTACT: "How long were you in contact with the COVID-19 patient?" (e g , a few seconds, passed by person, a few minutes, live with the patient)     EVERYDAY FOR LAST 5 DAYS  5  DATE of CONTACT: "When did you have contact with a COVID-19 patient?" (e g , how many days ago)     LAST 5 DAYS  6  TRAVEL: "Have you traveled out of the country recently?" If so, "When and where?"      * Also ask about out-of-state travel, since the CDC has identified some high risk cities for community spread in the [address]  * Note: Travel becomes less relevant if there is widespread community transmission where the patient lives  NO  7  COMMUNITY SPREAD: "Are there lots of cases of COVID-19 (community spread) where you live?" (See public health department website, if unsure)    * MAJOR community spread: high number of cases; numbers of cases are increasing; many people hospitalized  * MINOR community spread: low number of cases; not increasing; few or no people hospitalized     YES  8  SYMPTOMS: "Do you have any symptoms?" (e g , fever, cough, breathing difficulty)     NO  9  PREGNANCY OR POSTPARTUM: "Is there any chance you are pregnant?" "When was your last menstrual period?" "Did you deliver in the last 2 weeks?"     NA  10  HIGH RISK: "Do you have any heart or lung problems?  Do you have a weak immune system?" (e g , CHF, COPD, asthma, HIV positive, chemotherapy, renal failure, diabetes mellitus, sickle cell anemia)       NO    Protocols used: CORONAVIRUS (COVID-19) - EXPOSURE-ADULT-AH

## 2020-09-27 NOTE — TELEPHONE ENCOUNTER
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

## 2020-09-29 LAB — SARS-COV-2 RNA SPEC QL NAA+PROBE: NOT DETECTED

## 2020-10-09 DIAGNOSIS — I10 ESSENTIAL HYPERTENSION: ICD-10-CM

## 2020-10-12 RX ORDER — LISINOPRIL 10 MG/1
10 TABLET ORAL DAILY
Qty: 30 TABLET | Refills: 0 | Status: SHIPPED | OUTPATIENT
Start: 2020-10-12 | End: 2020-11-05 | Stop reason: SDUPTHER

## 2020-11-05 DIAGNOSIS — I10 ESSENTIAL HYPERTENSION: ICD-10-CM

## 2020-11-05 RX ORDER — LISINOPRIL 10 MG/1
10 TABLET ORAL DAILY
Qty: 30 TABLET | Refills: 0 | Status: SHIPPED | OUTPATIENT
Start: 2020-11-05 | End: 2020-11-30 | Stop reason: SDUPTHER

## 2020-11-30 DIAGNOSIS — I10 ESSENTIAL HYPERTENSION: ICD-10-CM

## 2020-11-30 DIAGNOSIS — F42.8 OTHER OBSESSIVE-COMPULSIVE DISORDERS: ICD-10-CM

## 2020-11-30 RX ORDER — FLUOXETINE HYDROCHLORIDE 20 MG/1
20 CAPSULE ORAL 2 TIMES DAILY
Qty: 180 CAPSULE | Refills: 0 | Status: SHIPPED | OUTPATIENT
Start: 2020-11-30 | End: 2021-03-02 | Stop reason: SDUPTHER

## 2020-11-30 RX ORDER — LISINOPRIL 10 MG/1
10 TABLET ORAL DAILY
Qty: 30 TABLET | Refills: 0 | Status: SHIPPED | OUTPATIENT
Start: 2020-11-30 | End: 2020-12-29 | Stop reason: SDUPTHER

## 2020-12-14 ENCOUNTER — VBI (OUTPATIENT)
Dept: ADMINISTRATIVE | Facility: OTHER | Age: 70
End: 2020-12-14

## 2020-12-29 DIAGNOSIS — I10 ESSENTIAL HYPERTENSION: ICD-10-CM

## 2020-12-29 RX ORDER — LISINOPRIL 10 MG/1
10 TABLET ORAL DAILY
Qty: 30 TABLET | Refills: 0 | Status: SHIPPED | OUTPATIENT
Start: 2020-12-29 | End: 2021-02-01 | Stop reason: SDUPTHER

## 2021-01-12 ENCOUNTER — TELEMEDICINE (OUTPATIENT)
Dept: FAMILY MEDICINE CLINIC | Facility: CLINIC | Age: 71
End: 2021-01-12
Payer: COMMERCIAL

## 2021-01-12 VITALS — TEMPERATURE: 98.4 F

## 2021-01-12 DIAGNOSIS — Z20.822 EXPOSURE TO COVID-19 VIRUS: ICD-10-CM

## 2021-01-12 DIAGNOSIS — B34.9 VIRAL INFECTION, UNSPECIFIED: ICD-10-CM

## 2021-01-12 PROCEDURE — 1036F TOBACCO NON-USER: CPT | Performed by: PHYSICIAN ASSISTANT

## 2021-01-12 PROCEDURE — 1160F RVW MEDS BY RX/DR IN RCRD: CPT | Performed by: PHYSICIAN ASSISTANT

## 2021-01-12 PROCEDURE — U0003 INFECTIOUS AGENT DETECTION BY NUCLEIC ACID (DNA OR RNA); SEVERE ACUTE RESPIRATORY SYNDROME CORONAVIRUS 2 (SARS-COV-2) (CORONAVIRUS DISEASE [COVID-19]), AMPLIFIED PROBE TECHNIQUE, MAKING USE OF HIGH THROUGHPUT TECHNOLOGIES AS DESCRIBED BY CMS-2020-01-R: HCPCS | Performed by: PHYSICIAN ASSISTANT

## 2021-01-12 PROCEDURE — U0005 INFEC AGEN DETEC AMPLI PROBE: HCPCS | Performed by: PHYSICIAN ASSISTANT

## 2021-01-12 PROCEDURE — 99213 OFFICE O/P EST LOW 20 MIN: CPT | Performed by: PHYSICIAN ASSISTANT

## 2021-01-12 NOTE — PROGRESS NOTES
COVID-19 Virtual Visit     Assessment/Plan:    Problem List Items Addressed This Visit     None      Visit Diagnoses     Exposure to COVID-19 virus        Viral infection, unspecified        Relevant Orders    Novel Coronavirus (COVID-19), PCR [facility] - Collected at [employer] or [facility]         Disposition:     I recommended COVID-19 PCR testing on or after day 5 since last exposure and if negative can end quarantine after 7 days  Patient was instructed to watch for symptoms until 14 days after exposure  If patient were to develop symptoms, they should immediately self isolate and call our office for further guidance  Exposure, questionable symptomatolgy  Test, quarantine protocol, follow up with acute worsening     I have spent 10 minutes directly with the patient  Encounter provider Carolina Truong PA-C    Provider located at [place] [address]  [address] [place] [id number]    Recent Visits  No visits were found meeting these conditions  Showing recent visits within past 7 days and meeting all other requirements     Today's Visits  Date Type Provider Dept   01/12/21 Telemedicine Martha Peña PA-C  [facility]of[facility] Dumont   Showing today's visits and meeting all other requirements     Future Appointments  No visits were found meeting these conditions  Showing future appointments within next 150 days and meeting all other requirements      This virtual check-in was done via [employer] and patient was informed that this is not a secure, HIPAA-compliant platform  He agrees to proceed  Patient agrees to participate in a virtual check in via telephone or video visit instead of presenting to the office to address urgent/immediate medical needs  Patient is aware this is a billable service  After connecting through [facility], the patient was identified by name and date of birth   Raymon Bonds was informed that this was a telemedicine visit and that the exam was being conducted confidentially over secure lines  My office door was closed  No one else was in the room  Roya Navas acknowledged consent and understanding of privacy and security of the telemedicine visit  I informed the patient that I have reviewed his record in Epic and presented the opportunity for him to ask any questions regarding the visit today  The patient agreed to participate  Subjective:   Roya Navas is a 72 y o  male who is concerned about COVID-19  Patient is currently asymptomatic  Patient's symptoms include chills and headache  Patient denies fever, fatigue, malaise, congestion, rhinorrhea, sore throat, anosmia, loss of taste, cough, shortness of breath, chest tightness, abdominal pain, nausea, vomiting, diarrhea and myalgias  Date of symptom onset: 1/11/2021  Date of exposure: 1/7/2021    Exposure:   Contact with a person who is under investigation (PUI) for or who is positive for COVID-19 within the last 14 days?: Yes    Hospitalized recently for fever and/or lower respiratory symptoms?: No      Currently a healthcare worker that is involved in direct patient care?: No      Works in a special setting where the risk of COVID-19 transmission may be high? (this may include long-term care, correctional and MCC facilities; homeless shelters; assisted-living facilities and group homes ): No      Resident in a special setting where the risk of COVID-19 transmission may be high? (this may include long-term care, correctional and MCC facilities; homeless shelters; assisted-living facilities and group homes ): No      exposued to covid + individual 1/7-1/9  Started with some headache, congestion and temp 100 yesterday, today sx improved  No SOB or cough       Lab Results   Component Value Date    SARSCOV2 Not Detected 09/27/2020     Past Medical History:   Diagnosis Date    Arthritis     Cancer [facility])     prostate    Hyperlipidemia     Hypertension     OCD (obsessive compulsive disorder)      Past Surgical History:   Procedure Laterality Date    FINGER AMPUTATION Left     partial middle and ring    FL RETROGRADE PYELOGRAM  4/5/2019    FL RETROGRADE PYELOGRAM  5/9/2019    KNEE SURGERY Right     menisectomy    AL CYSTO/URETERO W/LITHOTRIPSY &INDWELL STENT INSRT Left 4/5/2019    Procedure: CYSTOSCOPY URETEROSCOPY WITH  INSERTION STENT URETERAL;  Surgeon: Fito Urbina MD;  [facility];  Service: Urology    AL CYSTO/URETERO W/LITHOTRIPSY &INDWELL STENT INSRT Left 5/9/2019    Procedure: CYSTOSCOPY URETEROSCOPY WITH LITHOTRIPSY HOLMIUM LASER, RETROGRADE PYELOGRAM AND INSERTION STENT URETERAL;  Surgeon: Fito Urbina MD;  [facility];  Service: Urology    AL LAP,PROSTATECTOMY,RADICAL,W/NERVE [address] ROBOTIC N/A 3/18/2019    Procedure: ROBOTIC RADICAL PROSTATECTOMY; BILATERAL PELVIC LYMPH NODE DISSECTION;  Surgeon: Fito Urbina MD;  [facility];  Service: Urology     Current Outpatient Medications   Medication Sig Dispense Refill    co-enzyme Q-10 30 MG capsule Take 200 mg by mouth daily      FLUoxetine (PROzac) 20 mg capsule Take 1 capsule (20 mg total) by mouth 2 (two) times a day 180 capsule 0    lisinopril (ZESTRIL) 10 mg tablet Take 1 tablet (10 mg total) by mouth daily 30 tablet 0    sildenafil (REVATIO) 20 mg tablet Take 2-3 tablets by mouth nightly (Patient not taking: Reported on 2/13/2020) 60 tablet 6     No current facility-administered medications for this visit  No Known Allergies    Review of Systems   Constitutional: Positive for chills  Negative for fatigue and fever  HENT: Negative for congestion, rhinorrhea and sore throat  Respiratory: Negative for cough, chest tightness and shortness of breath  Gastrointestinal: Negative for abdominal pain, diarrhea, nausea and vomiting  Musculoskeletal: Negative for myalgias  Neurological: Positive for headaches       Objective:    Vitals:    01/12/21 0952   Temp: 98 4 °F (36 9 °C)       Physical Exam  Vitals signs and nursing note reviewed  Constitutional:       General: He is not in acute distress  Appearance: Normal appearance  He is not ill-appearing  HENT:      Head: Normocephalic and atraumatic  Nose: Nose normal    Eyes:      Conjunctiva/sclera: Conjunctivae normal    Neck:      Musculoskeletal: Normal range of motion  Pulmonary:      Effort: Pulmonary effort is normal  No respiratory distress  Skin:     Coloration: Skin is not pale  Findings: No erythema or rash  Neurological:      Mental Status: He is alert and oriented to person, place, and time  Psychiatric:         Mood and Affect: Mood normal          Behavior: Behavior normal        VIRTUAL VISIT DISCLAIMER    Angelica Astorga acknowledges that he has consented to an online visit or consultation  He understands that the online visit is based solely on information provided by him, and that, in the absence of a face-to-face physical evaluation by the physician, the diagnosis he receives is both limited and provisional in terms of accuracy and completeness  This is not intended to replace a full medical face-to-face evaluation by the physician  Angelica Astorga understands and accepts these terms

## 2021-01-13 LAB — SARS-COV-2 RNA SPEC QL NAA+PROBE: NOT DETECTED

## 2021-02-01 DIAGNOSIS — I10 ESSENTIAL HYPERTENSION: ICD-10-CM

## 2021-02-01 RX ORDER — LISINOPRIL 10 MG/1
10 TABLET ORAL DAILY
Qty: 30 TABLET | Refills: 0 | Status: SHIPPED | OUTPATIENT
Start: 2021-02-01 | End: 2021-03-02 | Stop reason: SDUPTHER

## 2021-03-02 DIAGNOSIS — F42.8 OTHER OBSESSIVE-COMPULSIVE DISORDERS: ICD-10-CM

## 2021-03-02 DIAGNOSIS — I10 ESSENTIAL HYPERTENSION: ICD-10-CM

## 2021-03-02 RX ORDER — FLUOXETINE HYDROCHLORIDE 20 MG/1
20 CAPSULE ORAL 2 TIMES DAILY
Qty: 180 CAPSULE | Refills: 0 | Status: SHIPPED | OUTPATIENT
Start: 2021-03-02 | End: 2021-06-15 | Stop reason: SDUPTHER

## 2021-03-02 RX ORDER — LISINOPRIL 10 MG/1
10 TABLET ORAL DAILY
Qty: 30 TABLET | Refills: 0 | Status: SHIPPED | OUTPATIENT
Start: 2021-03-02 | End: 2021-03-29 | Stop reason: SDUPTHER

## 2021-03-10 DIAGNOSIS — Z23 ENCOUNTER FOR IMMUNIZATION: ICD-10-CM

## 2021-03-29 ENCOUNTER — OFFICE VISIT (OUTPATIENT)
Dept: FAMILY MEDICINE CLINIC | Facility: CLINIC | Age: 71
End: 2021-03-29
Payer: COMMERCIAL

## 2021-03-29 VITALS
WEIGHT: 184 LBS | OXYGEN SATURATION: 98 % | DIASTOLIC BLOOD PRESSURE: 85 MMHG | HEART RATE: 63 BPM | TEMPERATURE: 97.3 F | SYSTOLIC BLOOD PRESSURE: 138 MMHG | BODY MASS INDEX: 27.89 KG/M2 | HEIGHT: 68 IN

## 2021-03-29 DIAGNOSIS — Z12.11 SCREEN FOR COLON CANCER: Primary | ICD-10-CM

## 2021-03-29 DIAGNOSIS — E78.2 HYPERLIPIDEMIA, MIXED: ICD-10-CM

## 2021-03-29 DIAGNOSIS — E66.3 OVERWEIGHT: ICD-10-CM

## 2021-03-29 DIAGNOSIS — N18.30 STAGE 3 CHRONIC KIDNEY DISEASE, UNSPECIFIED WHETHER STAGE 3A OR 3B CKD (HCC): ICD-10-CM

## 2021-03-29 DIAGNOSIS — C61 PROSTATE CANCER (HCC): ICD-10-CM

## 2021-03-29 DIAGNOSIS — I10 ESSENTIAL HYPERTENSION: ICD-10-CM

## 2021-03-29 PROCEDURE — 3079F DIAST BP 80-89 MM HG: CPT | Performed by: PHYSICIAN ASSISTANT

## 2021-03-29 PROCEDURE — 1160F RVW MEDS BY RX/DR IN RCRD: CPT | Performed by: PHYSICIAN ASSISTANT

## 2021-03-29 PROCEDURE — 99214 OFFICE O/P EST MOD 30 MIN: CPT | Performed by: PHYSICIAN ASSISTANT

## 2021-03-29 PROCEDURE — 3075F SYST BP GE 130 - 139MM HG: CPT | Performed by: PHYSICIAN ASSISTANT

## 2021-03-29 PROCEDURE — 3725F SCREEN DEPRESSION PERFORMED: CPT | Performed by: PHYSICIAN ASSISTANT

## 2021-03-29 PROCEDURE — 3008F BODY MASS INDEX DOCD: CPT | Performed by: PHYSICIAN ASSISTANT

## 2021-03-29 PROCEDURE — 1036F TOBACCO NON-USER: CPT | Performed by: PHYSICIAN ASSISTANT

## 2021-03-29 RX ORDER — LISINOPRIL 10 MG/1
10 TABLET ORAL DAILY
Qty: 90 TABLET | Refills: 0 | Status: SHIPPED | OUTPATIENT
Start: 2021-03-29 | End: 2021-06-25 | Stop reason: SDUPTHER

## 2021-03-29 RX ORDER — ATORVASTATIN CALCIUM 10 MG/1
10 TABLET, FILM COATED ORAL DAILY
COMMUNITY
End: 2021-03-29 | Stop reason: ALTCHOICE

## 2021-03-29 NOTE — PROGRESS NOTES
Assessment/Plan:       Problem List Items Addressed This Visit        Cardiovascular and Mediastinum    Essential hypertension    Relevant Medications    lisinopril (ZESTRIL) 10 mg tablet    Other Relevant Orders    Comprehensive metabolic panel       Genitourinary    Prostate cancer (HCC)    Stage 3 chronic kidney disease    Relevant Orders    Comprehensive metabolic panel       Other    Hyperlipidemia, mixed    Relevant Orders    Lipid Panel with Direct LDL reflex      Other Visit Diagnoses     Screen for colon cancer    -  Primary    Relevant Orders    Cologuard    Overweight            BP at goal, continue lisinopril  Update labs  Update HM, due for CRC screening  No acute concerns today, exam is normal   continue regular 6 month follow ups    BMI Counseling: Body mass index is 27 98 kg/m²  The BMI is above normal  Nutrition recommendations include reducing portion sizes and decreasing overall calorie intake  Exercise recommendations include moderate aerobic physical activity for 150 minutes/week  Subjective:      Patient ID: Lencho Martínez is a 72 y o  male  Pt presents for 6 month follow up  He has no acute comlpaints  No labs completed prior to visit    HTN: on lisinopril 10mg, BP is 138/85  He does not regularly check his BP at home as it is normally controlled   HLD: stopped taking statin due to hand cramping, no recent lipid panel  OCD on 10mg prozac, he shares this really helps his sx  Hx of prostate cancer with prostatectomy 2 years ago, no LUTS, continues with urology  CKD, on ACE, no recent renal function    He is a non-smoker, he stays active and enjoys golf  No major changes to medical hx, FH or SH  The following portions of the patient's history were reviewed and updated as appropriate:   He  has a past medical history of Arthritis, Cancer ([address] ), Hyperlipidemia, Hypertension, and OCD (obsessive compulsive disorder)    He   Patient Active Problem List    Diagnosis Date Noted    Need for hepatitis C screening test 09/22/2020    Stage 3 chronic kidney disease 09/18/2019    Nephrolithiasis 04/05/2019    Psoriasis 02/27/2019    Prostate cancer ([facility] ) 02/07/2019    Medicare annual wellness visit, subsequent 10/04/2018    Essential hypertension 10/04/2018    Hyperlipidemia, mixed 10/04/2018     He  has a past surgical history that includes Knee surgery (Right); Finger amputation (Left); pr lap,prostatectomy,radical,w/nerve spare,incl robotic (N/A, 3/18/2019); [place] retrograde pyelogram (4/5/2019); pr cysto/uretero w/lithotripsy &indwell stent insrt (Left, 4/5/2019); pr cysto/uretero w/lithotripsy &indwell stent insrt (Left, 5/9/2019); and [place] retrograde pyelogram (5/9/2019)  His family history includes Cancer in his sister; No Known Problems in his brother, father, and mother  He  reports that he has quit smoking  He has never used smokeless tobacco  He reports that he does not drink alcohol or use drugs  Current Outpatient Medications   Medication Sig Dispense Refill    co-enzyme Q-10 30 MG capsule Take 200 mg by mouth daily      FLUoxetine (PROzac) 20 mg capsule Take 1 capsule (20 mg total) by mouth 2 (two) times a day 180 capsule 0    lisinopril (ZESTRIL) 10 mg tablet Take 1 tablet (10 mg total) by mouth daily 90 tablet 0    sildenafil (REVATIO) 20 mg tablet Take 2-3 tablets by mouth nightly (Patient not taking: Reported on 2/13/2020) 60 tablet 6     No current facility-administered medications for this visit        Current Outpatient Medications on File Prior to Visit   Medication Sig    co-enzyme Q-10 30 MG capsule Take 200 mg by mouth daily    FLUoxetine (PROzac) 20 mg capsule Take 1 capsule (20 mg total) by mouth 2 (two) times a day    sildenafil (REVATIO) 20 mg tablet Take 2-3 tablets by mouth nightly (Patient not taking: Reported on 2/13/2020)    [DISCONTINUED] atorvastatin (LIPITOR) 10 mg tablet Take 10 mg by mouth daily    [DISCONTINUED] lisinopril (ZESTRIL) 10 mg tablet Take 1 tablet (10 mg total) by mouth daily     No current facility-administered medications on file prior to visit  He has No Known Allergies       Review of Systems   Constitutional: Negative for chills, fatigue and fever  HENT: Negative for congestion, ear pain, hearing loss, nosebleeds, postnasal drip, rhinorrhea, sinus pressure, sinus pain, sneezing and sore throat  Eyes: Negative for pain, discharge, itching and visual disturbance  Respiratory: Negative for cough, chest tightness, shortness of breath and wheezing  Cardiovascular: Negative for chest pain, palpitations and leg swelling  Gastrointestinal: Negative for abdominal pain, blood in stool, constipation, diarrhea, nausea and vomiting  Genitourinary: Negative for frequency and urgency  Musculoskeletal: Negative  Skin: Negative  Neurological: Negative for dizziness, light-headedness and numbness  Psychiatric/Behavioral: Negative  Objective:      /85   Pulse 63   Temp (!) 97 3 °F (36 3 °C)   Ht 5' 8" (1 727 m)   Wt 83 5 kg (184 lb)   SpO2 98%   BMI 27 98 kg/m²          Physical Exam  Vitals signs and nursing note reviewed  Constitutional:       General: He is not in acute distress  Appearance: Normal appearance  HENT:      Head: Normocephalic and atraumatic  Nose: Nose normal       Mouth/Throat:      Mouth: Mucous membranes are moist       Pharynx: Oropharynx is clear  No oropharyngeal exudate or posterior oropharyngeal erythema  Eyes:      Pupils: Pupils are equal, round, and reactive to light  Neck:      Musculoskeletal: Normal range of motion and neck supple  Cardiovascular:      Rate and Rhythm: Normal rate and regular rhythm  Pulses: Normal pulses  Heart sounds: Normal heart sounds  No murmur  Pulmonary:      Effort: Pulmonary effort is normal  No respiratory distress  Breath sounds: Normal breath sounds  Abdominal:      General: Abdomen is flat   Bowel sounds are normal  There is no distension  Palpations: Abdomen is soft  There is no mass  Tenderness: There is no abdominal tenderness  There is no guarding  Musculoskeletal: Normal range of motion  General: No tenderness  Right lower leg: No edema  Left lower leg: No edema  Skin:     General: Skin is warm and dry  Coloration: Skin is not pale  Findings: No erythema or rash  Neurological:      Mental Status: He is alert and oriented to person, place, and time     Psychiatric:         Mood and Affect: Mood and affect normal

## 2021-04-12 ENCOUNTER — APPOINTMENT (OUTPATIENT)
Dept: LAB | Facility: MEDICAL CENTER | Age: 71
End: 2021-04-12
Payer: COMMERCIAL

## 2021-04-12 DIAGNOSIS — I10 ESSENTIAL HYPERTENSION: ICD-10-CM

## 2021-04-12 DIAGNOSIS — N18.30 STAGE 3 CHRONIC KIDNEY DISEASE, UNSPECIFIED WHETHER STAGE 3A OR 3B CKD (HCC): ICD-10-CM

## 2021-04-12 DIAGNOSIS — E78.2 HYPERLIPIDEMIA, MIXED: Primary | ICD-10-CM

## 2021-04-12 DIAGNOSIS — E78.2 HYPERLIPIDEMIA, MIXED: ICD-10-CM

## 2021-04-12 DIAGNOSIS — Z12.11 SCREEN FOR COLON CANCER: ICD-10-CM

## 2021-04-12 LAB
ALBUMIN SERPL BCP-MCNC: 4 G/DL (ref 3.5–5)
ALP SERPL-CCNC: 68 U/L (ref 46–116)
ALT SERPL W P-5'-P-CCNC: 19 U/L (ref 12–78)
ANION GAP SERPL CALCULATED.3IONS-SCNC: 5 MMOL/L (ref 4–13)
AST SERPL W P-5'-P-CCNC: 14 U/L (ref 5–45)
BILIRUB SERPL-MCNC: 0.75 MG/DL (ref 0.2–1)
BUN SERPL-MCNC: 22 MG/DL (ref 5–25)
CALCIUM SERPL-MCNC: 8.9 MG/DL (ref 8.3–10.1)
CHLORIDE SERPL-SCNC: 107 MMOL/L (ref 100–108)
CHOLEST SERPL-MCNC: 293 MG/DL (ref 50–200)
CO2 SERPL-SCNC: 26 MMOL/L (ref 21–32)
CREAT SERPL-MCNC: 1.21 MG/DL (ref 0.6–1.3)
GFR SERPL CREATININE-BSD FRML MDRD: 61 ML/MIN/1.73SQ M
GLUCOSE P FAST SERPL-MCNC: 89 MG/DL (ref 65–99)
HDLC SERPL-MCNC: 39 MG/DL
LDLC SERPL CALC-MCNC: 211 MG/DL (ref 0–100)
POTASSIUM SERPL-SCNC: 4.3 MMOL/L (ref 3.5–5.3)
PROT SERPL-MCNC: 7.4 G/DL (ref 6.4–8.2)
SODIUM SERPL-SCNC: 138 MMOL/L (ref 136–145)
TRIGL SERPL-MCNC: 214 MG/DL

## 2021-04-12 PROCEDURE — 80061 LIPID PANEL: CPT

## 2021-04-12 PROCEDURE — 36415 COLL VENOUS BLD VENIPUNCTURE: CPT

## 2021-04-12 PROCEDURE — 80053 COMPREHEN METABOLIC PANEL: CPT

## 2021-04-13 DIAGNOSIS — E78.2 HYPERLIPIDEMIA, MIXED: Primary | ICD-10-CM

## 2021-04-13 RX ORDER — ROSUVASTATIN CALCIUM 10 MG/1
10 TABLET, COATED ORAL DAILY
Qty: 30 TABLET | Refills: 2 | Status: SHIPPED | OUTPATIENT
Start: 2021-04-13 | End: 2021-05-12 | Stop reason: SDUPTHER

## 2021-05-03 ENCOUNTER — VBI (OUTPATIENT)
Dept: ADMINISTRATIVE | Facility: OTHER | Age: 71
End: 2021-05-03

## 2021-05-12 DIAGNOSIS — E78.2 HYPERLIPIDEMIA, MIXED: ICD-10-CM

## 2021-05-12 RX ORDER — ROSUVASTATIN CALCIUM 10 MG/1
10 TABLET, COATED ORAL DAILY
Qty: 30 TABLET | Refills: 0 | Status: SHIPPED | OUTPATIENT
Start: 2021-05-12 | End: 2021-06-15 | Stop reason: SDUPTHER

## 2021-06-14 DIAGNOSIS — F42.8 OTHER OBSESSIVE-COMPULSIVE DISORDERS: ICD-10-CM

## 2021-06-14 DIAGNOSIS — E78.2 HYPERLIPIDEMIA, MIXED: ICD-10-CM

## 2021-06-15 DIAGNOSIS — E78.2 HYPERLIPIDEMIA, MIXED: ICD-10-CM

## 2021-06-15 DIAGNOSIS — F42.8 OTHER OBSESSIVE-COMPULSIVE DISORDERS: ICD-10-CM

## 2021-06-15 RX ORDER — FLUOXETINE HYDROCHLORIDE 20 MG/1
20 CAPSULE ORAL 2 TIMES DAILY
Qty: 180 CAPSULE | Refills: 0 | Status: SHIPPED | OUTPATIENT
Start: 2021-06-15 | End: 2021-09-28

## 2021-06-15 RX ORDER — ROSUVASTATIN CALCIUM 10 MG/1
10 TABLET, COATED ORAL DAILY
Qty: 30 TABLET | Refills: 0 | OUTPATIENT
Start: 2021-06-15

## 2021-06-15 RX ORDER — ROSUVASTATIN CALCIUM 10 MG/1
10 TABLET, COATED ORAL DAILY
Qty: 30 TABLET | Refills: 0 | Status: SHIPPED | OUTPATIENT
Start: 2021-06-15 | End: 2021-07-12 | Stop reason: SDUPTHER

## 2021-06-25 DIAGNOSIS — I10 ESSENTIAL HYPERTENSION: ICD-10-CM

## 2021-06-25 RX ORDER — LISINOPRIL 10 MG/1
10 TABLET ORAL DAILY
Qty: 90 TABLET | Refills: 0 | Status: SHIPPED | OUTPATIENT
Start: 2021-06-25 | End: 2021-07-12

## 2021-07-12 DIAGNOSIS — I10 ESSENTIAL HYPERTENSION: ICD-10-CM

## 2021-07-12 DIAGNOSIS — E78.2 HYPERLIPIDEMIA, MIXED: ICD-10-CM

## 2021-07-12 RX ORDER — ROSUVASTATIN CALCIUM 10 MG/1
TABLET, COATED ORAL
Qty: 30 TABLET | Refills: 0 | Status: SHIPPED | OUTPATIENT
Start: 2021-07-12 | End: 2021-09-28 | Stop reason: SDUPTHER

## 2021-07-12 RX ORDER — ROSUVASTATIN CALCIUM 10 MG/1
10 TABLET, COATED ORAL DAILY
Qty: 30 TABLET | Refills: 0 | Status: SHIPPED | OUTPATIENT
Start: 2021-07-12 | End: 2021-07-12

## 2021-07-12 RX ORDER — LISINOPRIL 10 MG/1
TABLET ORAL
Qty: 90 TABLET | Refills: 0 | Status: SHIPPED | OUTPATIENT
Start: 2021-07-12 | End: 2021-11-29 | Stop reason: SDUPTHER

## 2021-08-12 ENCOUNTER — VBI (OUTPATIENT)
Dept: ADMINISTRATIVE | Facility: OTHER | Age: 71
End: 2021-08-12

## 2021-09-22 ENCOUNTER — RA CDI HCC (OUTPATIENT)
Dept: OTHER | Facility: HOSPITAL | Age: 71
End: 2021-09-22

## 2021-09-22 NOTE — PROGRESS NOTES
[facility]  coding opportunities       Chart reviewed, no opportunity found: CHART REVIEWED, NO OPPORTUNITY FOUND      F42 8 Other obsessive compulsive disorder does not risk adjust                  Patients insurance company: [facility] [address]  (Medicare Advantage and Commercial)

## 2021-09-27 ENCOUNTER — APPOINTMENT (OUTPATIENT)
Dept: LAB | Facility: MEDICAL CENTER | Age: 71
End: 2021-09-27
Payer: COMMERCIAL

## 2021-09-27 DIAGNOSIS — E78.2 HYPERLIPIDEMIA, MIXED: ICD-10-CM

## 2021-09-27 DIAGNOSIS — Z13.1 SCREENING FOR DIABETES MELLITUS: ICD-10-CM

## 2021-09-27 LAB
CHOLEST SERPL-MCNC: 166 MG/DL (ref 50–200)
HDLC SERPL-MCNC: 36 MG/DL
LDLC SERPL CALC-MCNC: 100 MG/DL (ref 0–100)
TRIGL SERPL-MCNC: 148 MG/DL

## 2021-09-27 PROCEDURE — 80061 LIPID PANEL: CPT

## 2021-09-27 PROCEDURE — 80053 COMPREHEN METABOLIC PANEL: CPT

## 2021-09-27 PROCEDURE — 36415 COLL VENOUS BLD VENIPUNCTURE: CPT

## 2021-09-28 ENCOUNTER — OFFICE VISIT (OUTPATIENT)
Dept: FAMILY MEDICINE CLINIC | Facility: CLINIC | Age: 71
End: 2021-09-28
Payer: COMMERCIAL

## 2021-09-28 VITALS
WEIGHT: 192 LBS | SYSTOLIC BLOOD PRESSURE: 118 MMHG | HEIGHT: 68 IN | BODY MASS INDEX: 29.1 KG/M2 | DIASTOLIC BLOOD PRESSURE: 80 MMHG | RESPIRATION RATE: 18 BRPM

## 2021-09-28 DIAGNOSIS — Z00.00 MEDICARE ANNUAL WELLNESS VISIT, SUBSEQUENT: ICD-10-CM

## 2021-09-28 DIAGNOSIS — F42.8 OTHER OBSESSIVE-COMPULSIVE DISORDERS: ICD-10-CM

## 2021-09-28 DIAGNOSIS — I10 ESSENTIAL HYPERTENSION: Primary | ICD-10-CM

## 2021-09-28 DIAGNOSIS — Z13.1 SCREENING FOR DIABETES MELLITUS: ICD-10-CM

## 2021-09-28 DIAGNOSIS — E78.2 HYPERLIPIDEMIA, MIXED: ICD-10-CM

## 2021-09-28 LAB
ALBUMIN SERPL BCP-MCNC: 4 G/DL (ref 3.5–5)
ALP SERPL-CCNC: 64 U/L (ref 46–116)
ALT SERPL W P-5'-P-CCNC: 33 U/L (ref 12–78)
ANION GAP SERPL CALCULATED.3IONS-SCNC: 3 MMOL/L (ref 4–13)
AST SERPL W P-5'-P-CCNC: 14 U/L (ref 5–45)
BILIRUB SERPL-MCNC: 0.93 MG/DL (ref 0.2–1)
BUN SERPL-MCNC: 21 MG/DL (ref 5–25)
CALCIUM SERPL-MCNC: 8.9 MG/DL (ref 8.3–10.1)
CHLORIDE SERPL-SCNC: 108 MMOL/L (ref 100–108)
CO2 SERPL-SCNC: 27 MMOL/L (ref 21–32)
CREAT SERPL-MCNC: 1.3 MG/DL (ref 0.6–1.3)
GFR SERPL CREATININE-BSD FRML MDRD: 56 ML/MIN/1.73SQ M
GLUCOSE SERPL-MCNC: 109 MG/DL (ref 65–140)
POTASSIUM SERPL-SCNC: 4.5 MMOL/L (ref 3.5–5.3)
PROT SERPL-MCNC: 7.6 G/DL (ref 6.4–8.2)
SODIUM SERPL-SCNC: 138 MMOL/L (ref 136–145)

## 2021-09-28 PROCEDURE — 1036F TOBACCO NON-USER: CPT | Performed by: PHYSICIAN ASSISTANT

## 2021-09-28 PROCEDURE — 1170F FXNL STATUS ASSESSED: CPT | Performed by: PHYSICIAN ASSISTANT

## 2021-09-28 PROCEDURE — 1160F RVW MEDS BY RX/DR IN RCRD: CPT | Performed by: PHYSICIAN ASSISTANT

## 2021-09-28 PROCEDURE — 3288F FALL RISK ASSESSMENT DOCD: CPT | Performed by: PHYSICIAN ASSISTANT

## 2021-09-28 PROCEDURE — 3008F BODY MASS INDEX DOCD: CPT | Performed by: PHYSICIAN ASSISTANT

## 2021-09-28 PROCEDURE — 3079F DIAST BP 80-89 MM HG: CPT | Performed by: PHYSICIAN ASSISTANT

## 2021-09-28 PROCEDURE — 1125F AMNT PAIN NOTED PAIN PRSNT: CPT | Performed by: PHYSICIAN ASSISTANT

## 2021-09-28 PROCEDURE — G0439 PPPS, SUBSEQ VISIT: HCPCS | Performed by: PHYSICIAN ASSISTANT

## 2021-09-28 PROCEDURE — 99214 OFFICE O/P EST MOD 30 MIN: CPT | Performed by: PHYSICIAN ASSISTANT

## 2021-09-28 PROCEDURE — 3725F SCREEN DEPRESSION PERFORMED: CPT | Performed by: PHYSICIAN ASSISTANT

## 2021-09-28 PROCEDURE — 3074F SYST BP LT 130 MM HG: CPT | Performed by: PHYSICIAN ASSISTANT

## 2021-09-28 RX ORDER — FLUOXETINE HYDROCHLORIDE 20 MG/1
20 CAPSULE ORAL 2 TIMES DAILY
Qty: 180 CAPSULE | Refills: 1 | Status: SHIPPED | OUTPATIENT
Start: 2021-09-28 | End: 2022-03-09

## 2021-09-28 RX ORDER — ROSUVASTATIN CALCIUM 10 MG/1
10 TABLET, COATED ORAL DAILY
Qty: 90 TABLET | Refills: 1 | Status: SHIPPED | OUTPATIENT
Start: 2021-09-28 | End: 2022-03-09

## 2021-09-28 NOTE — PROGRESS NOTES
Assessment/Plan:       Problem List Items Addressed This Visit        Cardiovascular and Mediastinum    Essential hypertension - Primary       Other    Medicare annual wellness visit, subsequent    Hyperlipidemia, mixed    Relevant Medications    rosuvastatin (CRESTOR) 10 MG tablet      Other Visit Diagnoses     Screening for diabetes mellitus        Relevant Orders    Comprehensive metabolic panel    Other obsessive-compulsive disorders        Relevant Medications    FLUoxetine (PROzac) 20 mg capsule        overall chronic conditions well controlled  Continue on lisinopril 10mg and crestor 10mg  meds refilled, labs reviewed with pt  6 month follow up  Earlier as needed    Subjective:      Patient ID: Romulo Burciaga is a 72 y o  male  Pt presents for routine follow up and lab review    HTN: on lisinopril 10mg, /80  No HA, CP, SOB, syncope   HLD: started on crestor 10mg in April 2021, LDL now 100 from 211  Total 166 from 293  Continue on prozac 20mg BID for other obsessive/compulsive disorders, doing well, no concerns  No complaints, he overall feels well and is staying physically active       The following portions of the patient's history were reviewed and updated as appropriate:   He  has a past medical history of Arthritis, Cancer ([facility] ), Hyperlipidemia, Hypertension, and OCD (obsessive compulsive disorder)  He   Patient Active Problem List    Diagnosis Date Noted    Need for hepatitis C screening test 09/22/2020    Stage 3 chronic [facility] disease ([facility] ) 09/18/2019    Nephrolithiasis 04/05/2019    Psoriasis 02/27/2019    Prostate cancer ([facility] ) 02/07/2019    Medicare annual wellness visit, subsequent 10/04/2018    Essential hypertension 10/04/2018    Hyperlipidemia, mixed 10/04/2018     He  has a past surgical history that includes Knee surgery (Right);  Finger amputation (Left); pr lap,prostatectomy,radical,w/nerve spare,incl robotic (N/A, 3/18/2019); [place] retrograde pyelogram (4/5/2019); pr cysto/uretero w/lithotripsy &indwell stent insrt (Left, 4/5/2019); pr cysto/uretero w/lithotripsy &indwell stent insrt (Left, 5/9/2019); and [place] retrograde pyelogram (5/9/2019)  His family history includes Cancer in his sister; No Known Problems in his brother, father, and mother  He  reports that he has quit smoking  He has never used smokeless tobacco  He reports that he does not drink alcohol and does not use drugs  Current Outpatient Medications   Medication Sig Dispense Refill    co-enzyme Q-10 30 MG capsule Take 200 mg by mouth daily      FLUoxetine (PROzac) 20 mg capsule Take 1 capsule (20 mg total) by mouth 2 (two) times a day 180 capsule 1    lisinopril (ZESTRIL) 10 mg tablet TAKE ONE TABLET BY MOUTH EVERY DAY 90 tablet 0    rosuvastatin (CRESTOR) 10 MG tablet Take 1 tablet (10 mg total) by mouth daily 90 tablet 1    sildenafil (REVATIO) 20 mg tablet Take 2-3 tablets by mouth nightly (Patient not taking: Reported on 2/13/2020) 60 tablet 6     No current facility-administered medications for this visit  Current Outpatient Medications on File Prior to Visit   Medication Sig    co-enzyme Q-10 30 MG capsule Take 200 mg by mouth daily    lisinopril (ZESTRIL) 10 mg tablet TAKE ONE TABLET BY MOUTH EVERY DAY    sildenafil (REVATIO) 20 mg tablet Take 2-3 tablets by mouth nightly (Patient not taking: Reported on 2/13/2020)    [DISCONTINUED] FLUoxetine (PROzac) 20 mg capsule Take 1 capsule (20 mg total) by mouth 2 (two) times a day    [DISCONTINUED] FLUoxetine (PROzac) 20 mg capsule Take 1 capsule (20 mg total) by mouth 2 (two) times a day    [DISCONTINUED] rosuvastatin (CRESTOR) 10 MG tablet TAKE ONE TABLET BY MOUTH EVERY DAY     No current facility-administered medications on file prior to visit  He has No Known Allergies       Review of Systems   Constitutional: Negative for chills, fatigue and fever     HENT: Negative for congestion, ear pain, hearing loss, nosebleeds, postnasal drip, rhinorrhea, sinus pressure, sinus pain, sneezing and sore throat  Eyes: Negative for pain, discharge, itching and visual disturbance  Respiratory: Negative for cough, chest tightness, shortness of breath and wheezing  Cardiovascular: Negative for chest pain, palpitations and leg swelling  Gastrointestinal: Negative for abdominal pain, blood in stool, constipation, diarrhea, nausea and vomiting  Genitourinary: Negative for frequency and urgency  Musculoskeletal: Negative  Skin: Negative  Neurological: Negative for dizziness, light-headedness, numbness and headaches  Objective:      /80   Resp 18   Ht 5' 8" (1 727 m)   Wt 87 1 kg (192 lb)   BMI 29 19 kg/m²          Physical Exam  Vitals and nursing note reviewed  Constitutional:       General: He is not in acute distress  Appearance: Normal appearance  HENT:      Head: Normocephalic and atraumatic  Nose: Nose normal       Mouth/Throat:      Mouth: Mucous membranes are moist       Pharynx: Oropharynx is clear  No oropharyngeal exudate or posterior oropharyngeal erythema  Eyes:      Pupils: Pupils are equal, round, and reactive to light  Cardiovascular:      Rate and Rhythm: Normal rate and regular rhythm  Pulses: Normal pulses  Heart sounds: Normal heart sounds  No murmur heard  Pulmonary:      Effort: Pulmonary effort is normal  No respiratory distress  Breath sounds: Normal breath sounds  No wheezing, rhonchi or rales  Abdominal:      General: Bowel sounds are normal  There is no distension  Palpations: Abdomen is soft  Tenderness: There is no abdominal tenderness  There is no guarding  Musculoskeletal:         General: Normal range of motion  Cervical back: Normal range of motion and neck supple  Right lower leg: No edema  Left lower leg: No edema  Skin:     General: Skin is warm and dry  Coloration: Skin is not pale  Findings: No erythema or rash  Neurological:      Mental Status: He is alert and oriented to person, place, and time     Psychiatric:         Mood and Affect: Mood and affect normal

## 2021-09-28 NOTE — PATIENT INSTRUCTIONS

## 2021-11-15 ENCOUNTER — VBI (OUTPATIENT)
Dept: ADMINISTRATIVE | Facility: OTHER | Age: 71
End: 2021-11-15

## 2021-11-29 DIAGNOSIS — I10 ESSENTIAL HYPERTENSION: ICD-10-CM

## 2021-11-29 RX ORDER — LISINOPRIL 10 MG/1
10 TABLET ORAL DAILY
Qty: 90 TABLET | Refills: 0 | Status: SHIPPED | OUTPATIENT
Start: 2021-11-29 | End: 2022-02-14 | Stop reason: SDUPTHER

## 2022-02-14 DIAGNOSIS — I10 ESSENTIAL HYPERTENSION: ICD-10-CM

## 2022-02-14 RX ORDER — LISINOPRIL 10 MG/1
10 TABLET ORAL DAILY
Qty: 90 TABLET | Refills: 0 | Status: SHIPPED | OUTPATIENT
Start: 2022-02-14 | End: 2022-04-29 | Stop reason: SDUPTHER

## 2022-03-09 DIAGNOSIS — E78.2 HYPERLIPIDEMIA, MIXED: ICD-10-CM

## 2022-03-09 DIAGNOSIS — F42.8 OTHER OBSESSIVE-COMPULSIVE DISORDERS: ICD-10-CM

## 2022-03-09 RX ORDER — FLUOXETINE HYDROCHLORIDE 20 MG/1
CAPSULE ORAL
Qty: 180 CAPSULE | Refills: 1 | Status: SHIPPED | OUTPATIENT
Start: 2022-03-09

## 2022-03-09 RX ORDER — ROSUVASTATIN CALCIUM 10 MG/1
TABLET, COATED ORAL
Qty: 90 TABLET | Refills: 1 | Status: SHIPPED | OUTPATIENT
Start: 2022-03-09 | End: 2022-04-29 | Stop reason: SDUPTHER

## 2022-03-23 ENCOUNTER — RA CDI HCC (OUTPATIENT)
Dept: OTHER | Facility: HOSPITAL | Age: 72
End: 2022-03-23

## 2022-03-23 NOTE — PROGRESS NOTES
[facility]  coding opportunities       Chart reviewed, no opportunity found:   [address]        Patients Insurance     Medicare Insurance: [employer] Advantage

## 2022-03-24 DIAGNOSIS — Z12.5 SCREENING FOR PROSTATE CANCER: ICD-10-CM

## 2022-03-24 DIAGNOSIS — I10 ESSENTIAL HYPERTENSION: Primary | ICD-10-CM

## 2022-03-24 DIAGNOSIS — E78.2 HYPERLIPIDEMIA, MIXED: ICD-10-CM

## 2022-03-25 ENCOUNTER — APPOINTMENT (OUTPATIENT)
Dept: LAB | Facility: CLINIC | Age: 72
End: 2022-03-25
Payer: COMMERCIAL

## 2022-03-25 DIAGNOSIS — I10 ESSENTIAL HYPERTENSION: ICD-10-CM

## 2022-03-25 DIAGNOSIS — E78.2 HYPERLIPIDEMIA, MIXED: ICD-10-CM

## 2022-03-25 DIAGNOSIS — Z12.5 SCREENING FOR PROSTATE CANCER: ICD-10-CM

## 2022-03-25 LAB
ALBUMIN SERPL BCP-MCNC: 3.7 G/DL (ref 3.5–5)
ALP SERPL-CCNC: 68 U/L (ref 46–116)
ALT SERPL W P-5'-P-CCNC: 37 U/L (ref 12–78)
ANION GAP SERPL CALCULATED.3IONS-SCNC: 3 MMOL/L (ref 4–13)
AST SERPL W P-5'-P-CCNC: 18 U/L (ref 5–45)
BILIRUB SERPL-MCNC: 0.82 MG/DL (ref 0.2–1)
BUN SERPL-MCNC: 18 MG/DL (ref 5–25)
CALCIUM SERPL-MCNC: 9.1 MG/DL (ref 8.3–10.1)
CHLORIDE SERPL-SCNC: 105 MMOL/L (ref 100–108)
CHOLEST SERPL-MCNC: 197 MG/DL
CO2 SERPL-SCNC: 29 MMOL/L (ref 21–32)
CREAT SERPL-MCNC: 1.39 MG/DL (ref 0.6–1.3)
GFR SERPL CREATININE-BSD FRML MDRD: 50 ML/MIN/1.73SQ M
GLUCOSE P FAST SERPL-MCNC: 115 MG/DL (ref 65–99)
HDLC SERPL-MCNC: 34 MG/DL
LDLC SERPL CALC-MCNC: 110 MG/DL (ref 0–100)
POTASSIUM SERPL-SCNC: 4.5 MMOL/L (ref 3.5–5.3)
PROT SERPL-MCNC: 7.1 G/DL (ref 6.4–8.2)
PSA SERPL-MCNC: <0.1 NG/ML (ref 0–4)
SODIUM SERPL-SCNC: 137 MMOL/L (ref 136–145)
TRIGL SERPL-MCNC: 266 MG/DL

## 2022-03-25 PROCEDURE — 80053 COMPREHEN METABOLIC PANEL: CPT

## 2022-03-25 PROCEDURE — 80061 LIPID PANEL: CPT

## 2022-03-25 PROCEDURE — G0103 PSA SCREENING: HCPCS

## 2022-03-25 PROCEDURE — 36415 COLL VENOUS BLD VENIPUNCTURE: CPT

## 2022-03-29 ENCOUNTER — OFFICE VISIT (OUTPATIENT)
Dept: FAMILY MEDICINE CLINIC | Facility: CLINIC | Age: 72
End: 2022-03-29
Payer: COMMERCIAL

## 2022-03-29 VITALS
OXYGEN SATURATION: 98 % | HEART RATE: 62 BPM | DIASTOLIC BLOOD PRESSURE: 82 MMHG | WEIGHT: 192 LBS | SYSTOLIC BLOOD PRESSURE: 136 MMHG | TEMPERATURE: 96.3 F | BODY MASS INDEX: 29.1 KG/M2 | HEIGHT: 68 IN

## 2022-03-29 DIAGNOSIS — I10 ESSENTIAL HYPERTENSION: Primary | ICD-10-CM

## 2022-03-29 DIAGNOSIS — Z12.11 SCREEN FOR COLON CANCER: ICD-10-CM

## 2022-03-29 DIAGNOSIS — N18.31 STAGE 3A CHRONIC KIDNEY DISEASE (HCC): ICD-10-CM

## 2022-03-29 DIAGNOSIS — E78.2 HYPERLIPIDEMIA, MIXED: ICD-10-CM

## 2022-03-29 DIAGNOSIS — C61 PROSTATE CANCER (HCC): ICD-10-CM

## 2022-03-29 PROBLEM — Z11.59 NEED FOR HEPATITIS C SCREENING TEST: Status: RESOLVED | Noted: 2020-09-22 | Resolved: 2022-03-29

## 2022-03-29 PROBLEM — N20.0 NEPHROLITHIASIS: Status: RESOLVED | Noted: 2019-04-05 | Resolved: 2022-03-29

## 2022-03-29 PROCEDURE — 3079F DIAST BP 80-89 MM HG: CPT | Performed by: PHYSICIAN ASSISTANT

## 2022-03-29 PROCEDURE — 3008F BODY MASS INDEX DOCD: CPT | Performed by: PHYSICIAN ASSISTANT

## 2022-03-29 PROCEDURE — 3075F SYST BP GE 130 - 139MM HG: CPT | Performed by: PHYSICIAN ASSISTANT

## 2022-03-29 PROCEDURE — 1003F LEVEL OF ACTIVITY ASSESS: CPT | Performed by: PHYSICIAN ASSISTANT

## 2022-03-29 PROCEDURE — 99214 OFFICE O/P EST MOD 30 MIN: CPT | Performed by: PHYSICIAN ASSISTANT

## 2022-03-29 PROCEDURE — 1036F TOBACCO NON-USER: CPT | Performed by: PHYSICIAN ASSISTANT

## 2022-03-29 PROCEDURE — 1160F RVW MEDS BY RX/DR IN RCRD: CPT | Performed by: PHYSICIAN ASSISTANT

## 2022-03-29 NOTE — PROGRESS NOTES
Assessment/Plan:       Problem List Items Addressed This Visit        Cardiovascular and Mediastinum    Essential hypertension - Primary       Genitourinary    Prostate cancer ([facility] [address] )    Stage 3 chronic kidney disease ([facility] )    Relevant Orders    Basic metabolic panel       Other    Hyperlipidemia, mixed    Relevant Orders    Lipid Panel with Direct LDL reflex      Other Visit Diagnoses     Screen for colon cancer        Relevant Orders    Cologuard    BMI 29 0-29 9,adult            BP at goal continue lisinopril  Continue statin  Discussed dietary improvements  Avoid nsaids, stay well hydrated  Repeat labs in 3 months  Can follow up in 6 months, earlier prn  BMI Counseling: Body mass index is 29 19 kg/m²  The BMI is above normal  Nutrition recommendations include reducing portion sizes, decreasing overall calorie intake, moderation in carbohydrate intake, increasing intake of lean protein, reducing intake of saturated fat and trans fat and reducing intake of cholesterol  Exercise recommendations include moderate aerobic physical activity for 150 minutes/week  Subjective:      Patient ID: Guerrero Calixto is a 80 y o  male  Pt presents for follow up and lab review  Labs as below    HTN: on lisinopril, /82  No end organ complaints  HLD: on crestor, forgot to take his medication for a few days recently  CKD: on lisinopril, slight decrease though still around baseline  Avoiding excessive nsaids  Staying hydrated     Hx of prostate cancer with prostatectomy 2 years ago, no LUTS, continues with urology  Due for CRC screening  No acute concerns    Recent Results (from the past 672 hour(s))  -Comprehensive metabolic panel:   Collection Time: 03/25/22  7:28 AM       Result                      Value             Ref Range           Sodium                      137               136 - 145 mm*       Potassium                   4 5               3 5 - 5 3 mm*       Chloride                    105               100 - 108 mm*       CO2                         29                21 - 32 mmol*       ANION GAP                   3 (L)             4 - 13 mmol/L       BUN                         18                5 - 25 mg/dL        Creatinine                  1 39 (H)          0 60 - 1 30 *       Glucose, Fasting            115 (H)           65 - 99 mg/dL       Calcium                     9 1               8 3 - 10 1 m*       AST                         18                5 - 45 U/L          ALT                         37                12 - 78 U/L         Alkaline Phosphatase        68                46 - 116 U/L        Total Protein               7 1               6 4 - 8 2 g/*       Albumin                     3 7               3 5 - 5 0 g/*       Total Bilirubin             0 82              0 20 - 1 00 *       eGFR                        50                ml/min/1 73s*  -Lipid Panel with Direct LDL reflex:   Collection Time: 03/25/22  7:28 AM       Result                      Value             Ref Range           Cholesterol                 197               See Comment *       Triglycerides               266 (H)           See Comment *       HDL, Direct                 34 (L)            >=40 mg/dL          LDL Calculated              110 (H)           0 - 100 mg/dL  -PSA, Total Screen:   Collection Time: 03/25/22  7:28 AM       Result                      Value             Ref Range           PSA                         <0 1              0 0 - 4 0 ng*        The following portions of the patient's history were reviewed and updated as appropriate:   He  has a past medical history of Arthritis, Cancer ([facility] ), Hyperlipidemia, Hypertension, and OCD (obsessive compulsive disorder)    He   Patient Active Problem List    Diagnosis Date Noted    Stage 3 chronic kidney disease ([facility] ) 09/18/2019    Psoriasis 02/27/2019    Prostate cancer ([facility] ) 02/07/2019    Medicare annual wellness visit, subsequent 10/04/2018    Essential hypertension 10/04/2018    Hyperlipidemia, mixed 10/04/2018     He  has a past surgical history that includes Knee surgery (Right); Finger amputation (Left); pr lap,prostatectomy,radical,w/nerve spare,incl robotic (N/A, 3/18/2019); [place] retrograde pyelogram (4/5/2019); pr cysto/uretero w/lithotripsy &indwell stent insrt (Left, 4/5/2019); pr cysto/uretero w/lithotripsy &indwell stent insrt (Left, 5/9/2019); and [place] retrograde pyelogram (5/9/2019)  His family history includes Cancer in his sister; No Known Problems in his brother, father, and mother  He  reports that he has quit smoking  He has never used smokeless tobacco  He reports that he does not drink alcohol and does not use drugs  Current Outpatient Medications   Medication Sig Dispense Refill    co-enzyme Q-10 30 MG capsule Take 200 mg by mouth daily      FLUoxetine (PROzac) 20 mg capsule TAKE ONE CAPSULE BY MOUTH TWICE A  capsule 1    lisinopril (ZESTRIL) 10 mg tablet Take 1 tablet (10 mg total) by mouth daily 90 tablet 0    rosuvastatin (CRESTOR) 10 MG tablet TAKE ONE TABLET BY MOUTH EVERY DAY 90 tablet 1    sildenafil (REVATIO) 20 mg tablet Take 2-3 tablets by mouth nightly (Patient not taking: Reported on 2/13/2020) 60 tablet 6     No current facility-administered medications for this visit  Current Outpatient Medications on File Prior to Visit   Medication Sig    co-enzyme Q-10 30 MG capsule Take 200 mg by mouth daily    FLUoxetine (PROzac) 20 mg capsule TAKE ONE CAPSULE BY MOUTH TWICE A DAY    lisinopril (ZESTRIL) 10 mg tablet Take 1 tablet (10 mg total) by mouth daily    rosuvastatin (CRESTOR) 10 MG tablet TAKE ONE TABLET BY MOUTH EVERY DAY    sildenafil (REVATIO) 20 mg tablet Take 2-3 tablets by mouth nightly (Patient not taking: Reported on 2/13/2020)     No current facility-administered medications on file prior to visit  He has No Known Allergies       Review of Systems   Constitutional: Negative for chills, fatigue and fever     HENT: Negative for congestion, ear pain, hearing loss, nosebleeds, postnasal drip, rhinorrhea, sinus pressure, sinus pain, sneezing and sore throat  Eyes: Negative for pain, discharge, itching and visual disturbance  Respiratory: Negative for cough, chest tightness, shortness of breath and wheezing  Cardiovascular: Negative for chest pain, palpitations and leg swelling  Gastrointestinal: Negative for abdominal pain, blood in stool, constipation, diarrhea, nausea and vomiting  Genitourinary: Negative for frequency and urgency  Musculoskeletal: Negative  Skin: Negative  Neurological: Negative for dizziness, light-headedness and numbness  Objective:      /82 (BP Location: Left arm, Patient Position: Sitting, Cuff Size: Adult)   Pulse 62   Temp (!) 96 3 °F (35 7 °C)   Ht 5' 8" (1 727 m)   Wt 87 1 kg (192 lb)   SpO2 98%   BMI 29 19 kg/m²          Physical Exam  Vitals and nursing note reviewed  Constitutional:       General: He is not in acute distress  Appearance: Normal appearance  HENT:      Head: Normocephalic and atraumatic  Nose: Nose normal    Eyes:      Pupils: Pupils are equal, round, and reactive to light  Cardiovascular:      Rate and Rhythm: Normal rate and regular rhythm  Pulses: Normal pulses  Heart sounds: Normal heart sounds  No murmur heard  Pulmonary:      Effort: Pulmonary effort is normal  No respiratory distress  Breath sounds: Normal breath sounds  No wheezing, rhonchi or rales  Abdominal:      General: Bowel sounds are normal  There is no distension  Palpations: Abdomen is soft  Tenderness: There is no abdominal tenderness  There is no guarding  Musculoskeletal:         General: Normal range of motion  Cervical back: Normal range of motion and neck supple  Right lower leg: No edema  Left lower leg: No edema  Skin:     General: Skin is warm and dry     Neurological:      Mental Status: He is alert and oriented to person, place, and time     Psychiatric:         Mood and Affect: Mood and affect normal

## 2022-04-29 DIAGNOSIS — I10 ESSENTIAL HYPERTENSION: ICD-10-CM

## 2022-04-29 DIAGNOSIS — E78.2 HYPERLIPIDEMIA, MIXED: ICD-10-CM

## 2022-04-29 RX ORDER — LISINOPRIL 10 MG/1
10 TABLET ORAL DAILY
Qty: 90 TABLET | Refills: 0 | Status: SHIPPED | OUTPATIENT
Start: 2022-04-29 | End: 2022-07-22 | Stop reason: SDUPTHER

## 2022-04-29 RX ORDER — ROSUVASTATIN CALCIUM 10 MG/1
10 TABLET, COATED ORAL DAILY
Qty: 90 TABLET | Refills: 0 | Status: SHIPPED | OUTPATIENT
Start: 2022-04-29 | End: 2022-07-22 | Stop reason: SDUPTHER

## 2022-05-03 ENCOUNTER — TELEPHONE (OUTPATIENT)
Dept: FAMILY MEDICINE CLINIC | Facility: CLINIC | Age: 72
End: 2022-05-03

## 2022-05-03 NOTE — TELEPHONE ENCOUNTER
Pt was exposed to covid and tested positive via home test  He has a stuffy nose and headache  He would like to know quarantine instructions and if he should do a PCR test  Please advise, thank you!

## 2022-05-03 NOTE — TELEPHONE ENCOUNTER
Recommend isolation for 5 days, at that time if feeling improved with no fevers can stop isolating and wear mask for 5 days  If there is high fevers, SOB or O2 <90% needs to seek evaluation right away  Recommend rest, fluids, tylenol and follow up prn  No need for PCR testing

## 2022-06-15 LAB — COLOGUARD RESULT REPORTABLE: POSITIVE

## 2022-06-16 DIAGNOSIS — R19.5 POSITIVE COLORECTAL CANCER SCREENING USING COLOGUARD TEST: Primary | ICD-10-CM

## 2022-06-27 ENCOUNTER — OFFICE VISIT (OUTPATIENT)
Dept: GASTROENTEROLOGY | Facility: CLINIC | Age: 72
End: 2022-06-27
Payer: COMMERCIAL

## 2022-06-27 VITALS
SYSTOLIC BLOOD PRESSURE: 126 MMHG | HEART RATE: 85 BPM | WEIGHT: 192.4 LBS | HEIGHT: 69 IN | DIASTOLIC BLOOD PRESSURE: 82 MMHG | BODY MASS INDEX: 28.5 KG/M2

## 2022-06-27 DIAGNOSIS — R19.5 POSITIVE COLORECTAL CANCER SCREENING USING COLOGUARD TEST: ICD-10-CM

## 2022-06-27 PROCEDURE — 99213 OFFICE O/P EST LOW 20 MIN: CPT | Performed by: PHYSICIAN ASSISTANT

## 2022-06-27 PROCEDURE — 3079F DIAST BP 80-89 MM HG: CPT | Performed by: PHYSICIAN ASSISTANT

## 2022-06-27 PROCEDURE — 1160F RVW MEDS BY RX/DR IN RCRD: CPT | Performed by: PHYSICIAN ASSISTANT

## 2022-06-27 PROCEDURE — 1036F TOBACCO NON-USER: CPT | Performed by: PHYSICIAN ASSISTANT

## 2022-06-27 PROCEDURE — 3074F SYST BP LT 130 MM HG: CPT | Performed by: PHYSICIAN ASSISTANT

## 2022-06-27 PROCEDURE — 3008F BODY MASS INDEX DOCD: CPT | Performed by: PHYSICIAN ASSISTANT

## 2022-06-27 NOTE — PATIENT INSTRUCTIONS
Scheduled date of colonoscopy (as of today):9/13/22  Physician performing colonoscopy:Ingris  Location of colonoscopy:[place]  Bowel prep reviewed with patient:miralax/dulcolax  Instructions reviewed with patient by:Ade GREGORY  Clearances:  none

## 2022-06-27 NOTE — PROGRESS NOTES
[facility] - Outpatient Consultation  Lance Hansen 80 y o  male MRN: [id number]  Encounter: [id number]          ASSESSMENT AND PLAN:      1  Positive colorectal cancer screening using Cologuard test  -Will plan colonoscopy at this time  ______________________________________________________________________    HPI:   73-year-old male with a past medical history significant for hypertension and prostate cancer who presents for GI consultation  Patient most recently had a positive Cologuard  Patient denies any diarrhea or constipation  Patient denies any melena or rectal bleeding  Patient denies any family history of colon cancer colon polyps  REVIEW OF SYSTEMS:    CONSTITUTIONAL: Denies any fever, chills, rigors, and weight loss  HEENT: No earache or tinnitus  Denies hearing loss or visual disturbances  CARDIOVASCULAR: No chest pain or palpitations  RESPIRATORY: Denies any cough, hemoptysis, shortness of breath or dyspnea on exertion  GASTROINTESTINAL: As noted in the History of Present Illness  GENITOURINARY: No problems with urination  Denies any hematuria or dysuria  NEUROLOGIC: No dizziness or vertigo, denies headaches  MUSCULOSKELETAL: Denies any muscle or joint pain  SKIN: Denies skin rashes or itching  ENDOCRINE: Denies excessive thirst  Denies intolerance to heat or cold  PSYCHOSOCIAL: Denies depression or anxiety  Denies any recent memory loss         Historical Information   Past Medical History:   Diagnosis Date    Arthritis     Cancer ([address] )     prostate    Hyperlipidemia     Hypertension     OCD (obsessive compulsive disorder)      Past Surgical History:   Procedure Laterality Date    FINGER AMPUTATION Left     partial middle and ring    FL RETROGRADE PYELOGRAM  4/5/2019    FL RETROGRADE PYELOGRAM  5/9/2019    KNEE SURGERY Right     menisectomy    [place] CYSTO/URETERO W/LITHOTRIPSY &INDWELL STENT INSRT Left 4/5/2019    Procedure: Therese Soliz URETEROSCOPY WITH  INSERTION STENT URETERAL;  Surgeon: Courtney Pearson MD;  [facility];  Service: Urology    [place] CYSTO/URETERO W/LITHOTRIPSY &INDWELL STENT INSRT Left 5/9/2019    Procedure: CYSTOSCOPY URETEROSCOPY WITH LITHOTRIPSY HOLMIUM LASER, RETROGRADE PYELOGRAM AND INSERTION STENT URETERAL;  Surgeon: Courtney Pearson MD;  [facility];  Service: Urology    [place] LAP,PROSTATECTOMY,RADICAL,W/NERVE [address] ROBOTIC N/A 3/18/2019    Procedure: ROBOTIC RADICAL PROSTATECTOMY; BILATERAL PELVIC LYMPH NODE DISSECTION;  Surgeon: Courtney Pearson MD;  [facility];  Service: Urology     Social History   Social History     Substance and Sexual Activity   Alcohol Use No     Social History     Substance and Sexual Activity   Drug Use No     Social History     Tobacco Use   Smoking Status Former Smoker   Smokeless Tobacco Never Used   Tobacco Comment    30+ years ago     Family History   Problem Relation Age of Onset    No Known Problems Mother     No Known Problems Father     Cancer Sister     No Known Problems Brother        Meds/Allergies       Current Outpatient Medications:     co-enzyme Q-10 30 MG capsule    FLUoxetine (PROzac) 20 mg capsule    lisinopril (ZESTRIL) 10 mg tablet    rosuvastatin (CRESTOR) 10 MG tablet    sildenafil (REVATIO) 20 mg tablet    No Known Allergies        Objective     Blood pressure 126/82, pulse 85, height 5' 9" (1 753 m), weight 87 3 kg (192 lb 6 4 oz)  Body mass index is 28 41 kg/m²  PHYSICAL EXAM:      General Appearance:   Alert, cooperative, no distress   HEENT:   Normocephalic, atraumatic, anicteric      Neck:  Supple, symmetrical, trachea midline   Lungs:   Clear to auscultation bilaterally; no rales, rhonchi or wheezing; respirations unlabored    Heart[de-identified]   Regular rate and rhythm; no murmur, rub, or gallop     Abdomen:   Soft, non-tender, non-distended; normal bowel sounds; no masses, no organomegaly    Genitalia:   Deferred    Rectal:   Deferred    Extremities:  No cyanosis, clubbing or edema    Pulses:  2+ and symmetric    Skin:  No jaundice, rashes, or lesions    Lymph nodes:  No palpable cervical lymphadenopathy        Lab Results:   No visits with results within 1 Day(s) from this visit  Latest known visit with results is:   Office Visit on 03/29/2022   Component Date Value    Cologuard Result 06/10/2022 Positive (A)         Radiology Results:   No results found

## 2022-06-27 NOTE — LETTER
June 27, 2022     Agata Germain, [address]  [address]    Patient: Mason Jung   YOB: 1950   Date of Visit: 6/27/2022       Dear Dr Jacobs Bachelor: Thank you for referring Radha Culver to me for evaluation  Below are my notes for this consultation  If you have questions, please do not hesitate to call me  I look forward to following your patient along with you  Sincerely,        Rowan Sharma PA-C        CC: No Recipients  Ruel Vo  6/27/2022  2:28 PM  Sign when Signing Visit  [facility] - Outpatient Consultation  Mason Jung 80 y o  male MRN: [id number]  Encounter: [id number]          ASSESSMENT AND PLAN:      1  Positive colorectal cancer screening using Cologuard test  -Will plan colonoscopy at this time  ______________________________________________________________________    HPI:   23-year-old male with a past medical history significant for hypertension and prostate cancer who presents for GI consultation  Patient most recently had a positive Cologuard  Patient denies any diarrhea or constipation  Patient denies any melena or rectal bleeding  Patient denies any family history of colon cancer colon polyps  REVIEW OF SYSTEMS:    CONSTITUTIONAL: Denies any fever, chills, rigors, and weight loss  HEENT: No earache or tinnitus  Denies hearing loss or visual disturbances  CARDIOVASCULAR: No chest pain or palpitations  RESPIRATORY: Denies any cough, hemoptysis, shortness of breath or dyspnea on exertion  GASTROINTESTINAL: As noted in the History of Present Illness  GENITOURINARY: No problems with urination  Denies any hematuria or dysuria  NEUROLOGIC: No dizziness or vertigo, denies headaches  MUSCULOSKELETAL: Denies any muscle or joint pain  SKIN: Denies skin rashes or itching  ENDOCRINE: Denies excessive thirst  Denies intolerance to heat or cold  PSYCHOSOCIAL: Denies depression or anxiety   Denies any recent memory loss         Historical Information   Past Medical History:   Diagnosis Date    Arthritis     Cancer ([address] )     prostate    Hyperlipidemia     Hypertension     OCD (obsessive compulsive disorder)      Past Surgical History:   Procedure Laterality Date    FINGER AMPUTATION Left     partial middle and ring    FL RETROGRADE PYELOGRAM  4/5/2019    FL RETROGRADE PYELOGRAM  5/9/2019    KNEE SURGERY Right     menisectomy    [place] CYSTO/URETERO W/LITHOTRIPSY &INDWELL STENT INSRT Left 4/5/2019    Procedure: CYSTOSCOPY URETEROSCOPY WITH  INSERTION STENT URETERAL;  Surgeon: Grady aLwson MD;  [facility];  Service: Urology    [place] CYSTO/URETERO W/LITHOTRIPSY &INDWELL STENT INSRT Left 5/9/2019    Procedure: CYSTOSCOPY URETEROSCOPY WITH LITHOTRIPSY HOLMIUM LASER, RETROGRADE PYELOGRAM AND INSERTION STENT URETERAL;  Surgeon: Grady Lawson MD;  [facility];  Service: Urology    [place] LAP,PROSTATECTOMY,RADICAL,W/NERVE [address] ROBOTIC N/A 3/18/2019    Procedure: ROBOTIC RADICAL PROSTATECTOMY; BILATERAL PELVIC LYMPH NODE DISSECTION;  Surgeon: Grady Lawson MD;  [facility];  Service: Urology     Social History   Social History     Substance and Sexual Activity   Alcohol Use No     Social History     Substance and Sexual Activity   Drug Use No     Social History     Tobacco Use   Smoking Status Former Smoker   Smokeless Tobacco Never Used   Tobacco Comment    30+ years ago     Family History   Problem Relation Age of Onset    No Known Problems Mother     No Known Problems Father     Cancer Sister     No Known Problems Brother        Meds/Allergies       Current Outpatient Medications:     co-enzyme Q-10 30 MG capsule    FLUoxetine (PROzac) 20 mg capsule    lisinopril (ZESTRIL) 10 mg tablet    rosuvastatin (CRESTOR) 10 MG tablet    sildenafil (REVATIO) 20 mg tablet    No Known Allergies        Objective     Blood pressure 126/82, pulse 85, height 5' 9" (1 753 m), weight 87 3 kg (192 lb 6 4 oz)  Body mass index is 28 41 kg/m²  PHYSICAL EXAM:      General Appearance:   Alert, cooperative, no distress   HEENT:   Normocephalic, atraumatic, anicteric      Neck:  Supple, symmetrical, trachea midline   Lungs:   Clear to auscultation bilaterally; no rales, rhonchi or wheezing; respirations unlabored    Heart[de-identified]   Regular rate and rhythm; no murmur, rub, or gallop  Abdomen:   Soft, non-tender, non-distended; normal bowel sounds; no masses, no organomegaly    Genitalia:   Deferred    Rectal:   Deferred    Extremities:  No cyanosis, clubbing or edema    Pulses:  2+ and symmetric    Skin:  No jaundice, rashes, or lesions    Lymph nodes:  No palpable cervical lymphadenopathy        Lab Results:   No visits with results within 1 Day(s) from this visit  Latest known visit with results is:   Office Visit on 03/29/2022   Component Date Value    Cologuard Result 06/10/2022 Positive (A)         Radiology Results:   No results found

## 2022-06-30 ENCOUNTER — APPOINTMENT (OUTPATIENT)
Dept: LAB | Facility: CLINIC | Age: 72
End: 2022-06-30
Payer: COMMERCIAL

## 2022-06-30 DIAGNOSIS — E78.2 HYPERLIPIDEMIA, MIXED: ICD-10-CM

## 2022-06-30 DIAGNOSIS — N18.31 STAGE 3A CHRONIC KIDNEY DISEASE (HCC): ICD-10-CM

## 2022-06-30 LAB
ANION GAP SERPL CALCULATED.3IONS-SCNC: 4 MMOL/L (ref 4–13)
BUN SERPL-MCNC: 15 MG/DL (ref 5–25)
CALCIUM SERPL-MCNC: 9.1 MG/DL (ref 8.3–10.1)
CHLORIDE SERPL-SCNC: 106 MMOL/L (ref 100–108)
CHOLEST SERPL-MCNC: 153 MG/DL
CO2 SERPL-SCNC: 29 MMOL/L (ref 21–32)
CREAT SERPL-MCNC: 1.25 MG/DL (ref 0.6–1.3)
GFR SERPL CREATININE-BSD FRML MDRD: 57 ML/MIN/1.73SQ M
GLUCOSE P FAST SERPL-MCNC: 102 MG/DL (ref 65–99)
HDLC SERPL-MCNC: 37 MG/DL
LDLC SERPL CALC-MCNC: 81 MG/DL (ref 0–100)
POTASSIUM SERPL-SCNC: 3.7 MMOL/L (ref 3.5–5.3)
SODIUM SERPL-SCNC: 139 MMOL/L (ref 136–145)
TRIGL SERPL-MCNC: 175 MG/DL

## 2022-06-30 PROCEDURE — 80048 BASIC METABOLIC PNL TOTAL CA: CPT

## 2022-06-30 PROCEDURE — 36415 COLL VENOUS BLD VENIPUNCTURE: CPT

## 2022-06-30 PROCEDURE — 80061 LIPID PANEL: CPT

## 2022-07-22 DIAGNOSIS — I10 ESSENTIAL HYPERTENSION: ICD-10-CM

## 2022-07-22 DIAGNOSIS — E78.2 HYPERLIPIDEMIA, MIXED: ICD-10-CM

## 2022-07-22 RX ORDER — LISINOPRIL 10 MG/1
10 TABLET ORAL DAILY
Qty: 90 TABLET | Refills: 0 | Status: SHIPPED | OUTPATIENT
Start: 2022-07-22 | End: 2022-09-26 | Stop reason: SDUPTHER

## 2022-07-22 RX ORDER — ROSUVASTATIN CALCIUM 10 MG/1
10 TABLET, COATED ORAL DAILY
Qty: 90 TABLET | Refills: 0 | Status: SHIPPED | OUTPATIENT
Start: 2022-07-22 | End: 2022-10-03 | Stop reason: SDUPTHER

## 2022-09-13 ENCOUNTER — HOSPITAL ENCOUNTER (OUTPATIENT)
Dept: GASTROENTEROLOGY | Facility: HOSPITAL | Age: 72
Setting detail: OUTPATIENT SURGERY
Discharge: HOME | End: 2022-09-13
Payer: COMMERCIAL

## 2022-09-13 ENCOUNTER — ANESTHESIA (OUTPATIENT)
Dept: GASTROENTEROLOGY | Facility: HOSPITAL | Age: 72
End: 2022-09-13

## 2022-09-13 ENCOUNTER — ANESTHESIA EVENT (OUTPATIENT)
Dept: GASTROENTEROLOGY | Facility: HOSPITAL | Age: 72
End: 2022-09-13

## 2022-09-13 VITALS
HEIGHT: 69 IN | DIASTOLIC BLOOD PRESSURE: 66 MMHG | WEIGHT: 182.98 LBS | TEMPERATURE: 98.7 F | BODY MASS INDEX: 27.1 KG/M2 | HEART RATE: 62 BPM | OXYGEN SATURATION: 96 % | RESPIRATION RATE: 17 BRPM | SYSTOLIC BLOOD PRESSURE: 104 MMHG

## 2022-09-13 DIAGNOSIS — R19.5 POSITIVE COLORECTAL CANCER SCREENING USING COLOGUARD TEST: ICD-10-CM

## 2022-09-13 PROCEDURE — 88305 TISSUE EXAM BY PATHOLOGIST: CPT | Performed by: PATHOLOGY

## 2022-09-13 PROCEDURE — 45385 COLONOSCOPY W/LESION REMOVAL: CPT | Performed by: INTERNAL MEDICINE

## 2022-09-13 RX ORDER — SODIUM CHLORIDE, SODIUM LACTATE, POTASSIUM CHLORIDE, CALCIUM CHLORIDE 600; 310; 30; 20 MG/100ML; MG/100ML; MG/100ML; MG/100ML
INJECTION, SOLUTION INTRAVENOUS CONTINUOUS PRN
Status: DISCONTINUED | OUTPATIENT
Start: 2022-09-13 | End: 2022-09-13

## 2022-09-13 RX ORDER — ONDANSETRON 2 MG/ML
4 INJECTION INTRAMUSCULAR; INTRAVENOUS ONCE AS NEEDED
Status: CANCELLED | OUTPATIENT
Start: 2022-09-13

## 2022-09-13 RX ORDER — LIDOCAINE HYDROCHLORIDE 10 MG/ML
INJECTION, SOLUTION EPIDURAL; INFILTRATION; INTRACAUDAL; PERINEURAL AS NEEDED
Status: DISCONTINUED | OUTPATIENT
Start: 2022-09-13 | End: 2022-09-13

## 2022-09-13 RX ORDER — FENTANYL CITRATE/PF 50 MCG/ML
50 SYRINGE (ML) INJECTION
Status: CANCELLED | OUTPATIENT
Start: 2022-09-13

## 2022-09-13 RX ORDER — HYDROMORPHONE HCL/PF 1 MG/ML
0.4 SYRINGE (ML) INJECTION
Status: CANCELLED | OUTPATIENT
Start: 2022-09-13

## 2022-09-13 RX ORDER — SODIUM CHLORIDE, SODIUM LACTATE, POTASSIUM CHLORIDE, CALCIUM CHLORIDE 600; 310; 30; 20 MG/100ML; MG/100ML; MG/100ML; MG/100ML
50 INJECTION, SOLUTION INTRAVENOUS CONTINUOUS
Status: CANCELLED | OUTPATIENT
Start: 2022-09-13

## 2022-09-13 RX ORDER — PROMETHAZINE HYDROCHLORIDE 25 MG/ML
12.5 INJECTION, SOLUTION INTRAMUSCULAR; INTRAVENOUS ONCE AS NEEDED
Status: CANCELLED | OUTPATIENT
Start: 2022-09-13

## 2022-09-13 RX ORDER — METOCLOPRAMIDE HYDROCHLORIDE 5 MG/ML
10 INJECTION INTRAMUSCULAR; INTRAVENOUS ONCE AS NEEDED
Status: CANCELLED | OUTPATIENT
Start: 2022-09-13

## 2022-09-13 RX ORDER — PROPOFOL 10 MG/ML
INJECTION, EMULSION INTRAVENOUS AS NEEDED
Status: DISCONTINUED | OUTPATIENT
Start: 2022-09-13 | End: 2022-09-13

## 2022-09-13 RX ORDER — ALBUTEROL SULFATE 2.5 MG/3ML
2.5 SOLUTION RESPIRATORY (INHALATION) ONCE AS NEEDED
Status: CANCELLED | OUTPATIENT
Start: 2022-09-13

## 2022-09-13 RX ADMIN — LIDOCAINE HYDROCHLORIDE 50 MG: 10 INJECTION, SOLUTION EPIDURAL; INFILTRATION; INTRACAUDAL at 11:25

## 2022-09-13 RX ADMIN — PROPOFOL 100 MG: 10 INJECTION, EMULSION INTRAVENOUS at 11:25

## 2022-09-13 RX ADMIN — PROPOFOL 50 MG: 10 INJECTION, EMULSION INTRAVENOUS at 11:30

## 2022-09-13 RX ADMIN — SODIUM CHLORIDE, SODIUM LACTATE, POTASSIUM CHLORIDE, AND CALCIUM CHLORIDE: .6; .31; .03; .02 INJECTION, SOLUTION INTRAVENOUS at 11:21

## 2022-09-13 RX ADMIN — PROPOFOL 50 MG: 10 INJECTION, EMULSION INTRAVENOUS at 11:35

## 2022-09-13 RX ADMIN — PROPOFOL 50 MG: 10 INJECTION, EMULSION INTRAVENOUS at 11:38

## 2022-09-13 RX ADMIN — PROPOFOL 50 MG: 10 INJECTION, EMULSION INTRAVENOUS at 11:28

## 2022-09-13 RX ADMIN — PROPOFOL 50 MG: 10 INJECTION, EMULSION INTRAVENOUS at 11:32

## 2022-09-13 NOTE — ANESTHESIA PREPROCEDURE EVALUATION
Procedure:  COLONOSCOPY    Relevant Problems   CARDIO   (+) Essential hypertension   (+) Hyperlipidemia, mixed      /RENAL   (+) Prostate cancer (HCC)   (+) Stage 3 chronic kidney disease (HCC)        Physical Exam    Airway    Mallampati score: II  TM Distance: >3 FB  Neck ROM: full     Dental   No notable dental hx     Cardiovascular  Cardiovascular exam normal    Pulmonary  Pulmonary exam normal Breath sounds clear to auscultation,     Other Findings        Anesthesia Plan  ASA Score- 2     Anesthesia Type- IV sedation with anesthesia with ASA Monitors  Additional Monitors:   Airway Plan:           Plan Factors-    Chart reviewed  EKG reviewed  Imaging results reviewed  Existing labs reviewed  Patient summary reviewed  Patient is not a current smoker  Patient instructed to abstain from smoking on day of procedure  Patient did not smoke on day of surgery  Obstructive sleep apnea risk education given perioperatively  Induction- intravenous  Postoperative Plan-     Informed Consent- Anesthetic plan and risks discussed with patient  I personally reviewed this patient with the CRNA  Discussed and agreed on the Anesthesia Plan with the CRNA             Lab Results   Component Value Date    WBC 14 71 (H) 02/11/2020    HGB 16 1 02/11/2020    HCT 47 8 02/11/2020    MCV 88 02/11/2020     02/11/2020     Lab Results   Component Value Date    CALCIUM 9 1 06/30/2022    K 3 7 06/30/2022    CO2 29 06/30/2022     06/30/2022    BUN 15 06/30/2022    CREATININE 1 25 06/30/2022     Lab Results   Component Value Date    INR 1 04 02/25/2019    PROTIME 13 3 02/25/2019     Lab Results   Component Value Date    PTT 35 02/25/2019     Type and Screen:  A        Discussed with pt the benefits/alternatives and risks or General Anesthesia including breathing tube remaining in place if not strong enough, PONV, damage to lips and teeth, sore throat, eye injury or blindness    IDr Wong Or, the attending physician, have personally seen and evaluated the patient prior to anesthetic care  I have reviewed the pre-anesthetic record, and other medical records if appropriate to the anesthetic care  If a CRNA is involved in the case, I have reviewed the CRNA assessment, if present, and agree  The patient is in a suitable condition to proceed with my formulated anesthetic plan

## 2022-09-13 NOTE — ANESTHESIA POSTPROCEDURE EVALUATION
Post-Op Assessment Note    CV Status:  Stable  Pain Score: 0    Pain management: adequate     Mental Status:  Alert and awake   Hydration Status:  Euvolemic   PONV Controlled:  Controlled   Airway Patency:  Patent and adequate      Post Op Vitals Reviewed: Yes      Staff: CRNA         No complications documented      BP   93/58   Temp   97 1   Pulse  70   Resp   20   SpO2   98

## 2022-09-13 NOTE — H&P
History and Physical - [facility]  Herve Jordan 80 y o  male MRN: [id number]      HPI: Herve Jordan is a 80y o  year old male who presents for a positive Cologuard      REVIEW OF SYSTEMS: Per the HPI, and otherwise unremarkable      Historical Information   Past Medical History:   Diagnosis Date    Arthritis     Cancer ([address] )     prostate    Hyperlipidemia     Hypertension     OCD (obsessive compulsive disorder)      Past Surgical History:   Procedure Laterality Date    FINGER AMPUTATION Left     partial middle and ring    FL RETROGRADE PYELOGRAM  04/05/2019    FL RETROGRADE PYELOGRAM  05/09/2019    KNEE ARTHROSCOPY Right     acl    KNEE SURGERY Right     menisectomy    [place] CYSTO/URETERO W/LITHOTRIPSY &INDWELL STENT INSRT Left 04/05/2019    Procedure: CYSTOSCOPY URETEROSCOPY WITH  INSERTION STENT URETERAL;  Surgeon: Trinidad Soares MD;  [facility];  Service: Urology    [place] CYSTO/URETERO W/LITHOTRIPSY &INDWELL STENT INSRT Left 05/09/2019    Procedure: CYSTOSCOPY URETEROSCOPY WITH LITHOTRIPSY HOLMIUM LASER, RETROGRADE PYELOGRAM AND INSERTION STENT URETERAL;  Surgeon: Trinidad Soares MD;  [facility];  Service: Urology    [place] LAP,PROSTATECTOMY,RADICAL,W/NERVE [address] ROBOTIC N/A 03/18/2019    Procedure: ROBOTIC RADICAL PROSTATECTOMY; BILATERAL PELVIC LYMPH NODE DISSECTION;  Surgeon: Trinidad Soares MD;  [facility];  Service: Urology     Social History   Social History     Substance and Sexual Activity   Alcohol Use No     Social History     Substance and Sexual Activity   Drug Use No     Social History     Tobacco Use   Smoking Status Former Smoker   Smokeless Tobacco Never Used   Tobacco Comment    30+ years ago     Family History   Problem Relation Age of Onset    No Known Problems Mother     No Known Problems Father     Cancer Sister     No Known Problems Brother        Meds/Allergies     (Not in a hospital admission)      No Known Allergies    Objective     Blood pressure 160/90, pulse 78, temperature 98 7 °F (37 1 °C), temperature source Temporal, resp  rate 17, height 5' 9" (1 753 m), weight 83 kg (182 lb 15 7 oz), SpO2 97 %  PHYSICAL EXAM    Gen: NAD  CV: RRR  CHEST: Clear  ABD: soft, NT/ND  EXT: no edema      ASSESSMENT/PLAN:  This is a 80y o  year old male here for colonoscopy, and he is stable and optimized for his procedure

## 2022-09-20 PROCEDURE — 88305 TISSUE EXAM BY PATHOLOGIST: CPT | Performed by: PATHOLOGY

## 2022-09-21 ENCOUNTER — TELEPHONE (OUTPATIENT)
Dept: GASTROENTEROLOGY | Facility: CLINIC | Age: 72
End: 2022-09-21

## 2022-09-21 NOTE — TELEPHONE ENCOUNTER
----- Message from Cherry Jean DO sent at 9/21/2022  7:46 AM EDT -----  Please call the patient with a polyp results  All 4 polyps of the colon were precancerous adenomas and completely removed    The patient is due for repeat colonoscopy in 3 years

## 2022-09-26 DIAGNOSIS — I10 ESSENTIAL HYPERTENSION: ICD-10-CM

## 2022-09-27 RX ORDER — LISINOPRIL 10 MG/1
10 TABLET ORAL DAILY
Qty: 90 TABLET | Refills: 0 | Status: SHIPPED | OUTPATIENT
Start: 2022-09-27

## 2022-09-30 ENCOUNTER — APPOINTMENT (OUTPATIENT)
Dept: LAB | Facility: CLINIC | Age: 72
End: 2022-09-30
Payer: COMMERCIAL

## 2022-09-30 DIAGNOSIS — E78.2 HYPERLIPIDEMIA, MIXED: ICD-10-CM

## 2022-09-30 DIAGNOSIS — I10 ESSENTIAL HYPERTENSION: Primary | ICD-10-CM

## 2022-09-30 DIAGNOSIS — I10 ESSENTIAL HYPERTENSION: ICD-10-CM

## 2022-09-30 LAB
ALBUMIN SERPL BCP-MCNC: 3.6 G/DL (ref 3.5–5)
ALP SERPL-CCNC: 67 U/L (ref 46–116)
ALT SERPL W P-5'-P-CCNC: 26 U/L (ref 12–78)
ANION GAP SERPL CALCULATED.3IONS-SCNC: 5 MMOL/L (ref 4–13)
AST SERPL W P-5'-P-CCNC: 13 U/L (ref 5–45)
BILIRUB SERPL-MCNC: 0.58 MG/DL (ref 0.2–1)
BUN SERPL-MCNC: 21 MG/DL (ref 5–25)
CALCIUM SERPL-MCNC: 8.8 MG/DL (ref 8.3–10.1)
CHLORIDE SERPL-SCNC: 107 MMOL/L (ref 96–108)
CHOLEST SERPL-MCNC: 162 MG/DL
CO2 SERPL-SCNC: 27 MMOL/L (ref 21–32)
CREAT SERPL-MCNC: 1.34 MG/DL (ref 0.6–1.3)
GFR SERPL CREATININE-BSD FRML MDRD: 53 ML/MIN/1.73SQ M
GLUCOSE P FAST SERPL-MCNC: 98 MG/DL (ref 65–99)
HDLC SERPL-MCNC: 40 MG/DL
LDLC SERPL CALC-MCNC: 96 MG/DL (ref 0–100)
POTASSIUM SERPL-SCNC: 4.5 MMOL/L (ref 3.5–5.3)
PROT SERPL-MCNC: 7 G/DL (ref 6.4–8.4)
SODIUM SERPL-SCNC: 139 MMOL/L (ref 135–147)
TRIGL SERPL-MCNC: 131 MG/DL

## 2022-09-30 PROCEDURE — 80061 LIPID PANEL: CPT

## 2022-09-30 PROCEDURE — 80053 COMPREHEN METABOLIC PANEL: CPT

## 2022-09-30 PROCEDURE — 36415 COLL VENOUS BLD VENIPUNCTURE: CPT

## 2022-10-03 ENCOUNTER — OFFICE VISIT (OUTPATIENT)
Dept: FAMILY MEDICINE CLINIC | Facility: CLINIC | Age: 72
End: 2022-10-03
Payer: COMMERCIAL

## 2022-10-03 VITALS
OXYGEN SATURATION: 98 % | HEART RATE: 58 BPM | SYSTOLIC BLOOD PRESSURE: 116 MMHG | WEIGHT: 185 LBS | HEIGHT: 69 IN | BODY MASS INDEX: 27.4 KG/M2 | DIASTOLIC BLOOD PRESSURE: 78 MMHG | TEMPERATURE: 98.4 F

## 2022-10-03 DIAGNOSIS — E78.2 HYPERLIPIDEMIA, MIXED: ICD-10-CM

## 2022-10-03 DIAGNOSIS — F42.8 OTHER OBSESSIVE-COMPULSIVE DISORDERS: ICD-10-CM

## 2022-10-03 DIAGNOSIS — I10 ESSENTIAL HYPERTENSION: Primary | ICD-10-CM

## 2022-10-03 DIAGNOSIS — N18.31 STAGE 3A CHRONIC KIDNEY DISEASE (HCC): ICD-10-CM

## 2022-10-03 DIAGNOSIS — Z00.00 MEDICARE ANNUAL WELLNESS VISIT, SUBSEQUENT: ICD-10-CM

## 2022-10-03 DIAGNOSIS — C61 PROSTATE CANCER (HCC): ICD-10-CM

## 2022-10-03 PROCEDURE — G0439 PPPS, SUBSEQ VISIT: HCPCS | Performed by: PHYSICIAN ASSISTANT

## 2022-10-03 PROCEDURE — 99214 OFFICE O/P EST MOD 30 MIN: CPT | Performed by: PHYSICIAN ASSISTANT

## 2022-10-03 RX ORDER — FLUOXETINE HYDROCHLORIDE 20 MG/1
20 CAPSULE ORAL 2 TIMES DAILY
Qty: 180 CAPSULE | Refills: 1 | Status: SHIPPED | OUTPATIENT
Start: 2022-10-03

## 2022-10-03 RX ORDER — ROSUVASTATIN CALCIUM 10 MG/1
10 TABLET, COATED ORAL DAILY
Qty: 90 TABLET | Refills: 1 | Status: SHIPPED | OUTPATIENT
Start: 2022-10-03

## 2022-10-03 NOTE — PROGRESS NOTES
Assessment and Plan:     Problem List Items Addressed This Visit        Cardiovascular and Mediastinum    Essential hypertension - Primary    Relevant Orders    Comprehensive metabolic panel       Genitourinary    Prostate cancer ([facility] [address] )    Relevant Orders    PSA, Total Screen    Stage 3 chronic kidney disease ([facility] [address] )    Relevant Orders    Comprehensive metabolic panel       Other    Medicare annual wellness visit, subsequent    Hyperlipidemia, mixed    Relevant Medications    rosuvastatin (CRESTOR) 10 MG tablet    Other Relevant Orders    Lipid Panel with Direct LDL reflex      Other Visit Diagnoses     Other obsessive-compulsive disorders        Relevant Medications    FLUoxetine (PROzac) 20 mg capsule      BP well controlled, continue lisinopril  Renal function at baseline, stable  Lipids controlled, continue statin  Mood controlled on prozac  Labs reviewed  6 month follow up with labs, earlier prn  Preventive health issues were discussed with patient, and age appropriate screening tests were ordered as noted in patient's After Visit Summary  Personalized health advice and appropriate referrals for health education or preventive services given if needed, as noted in patient's After Visit Summary  History of Present Illness:     Patient presents for a Medicare Wellness Visit    Pt presents for follow up and lab review  No acute concerns today  HTN: well controlled on lisinopril, 116/78, no end organ complaints     HLD: well controlled on crestor  OCD: controlled on prozac BID  Hx of prostate cancer, hx of prostatectomy, PSA being followed    Recent Results (from the past 672 hour(s))    -Comprehensive metabolic panel:   Collection Time: 09/30/22  7:49 AM       Result                      Value             Ref Range           Sodium                      139               135 - 147 mm*       Potassium                   4 5               3 5 - 5 3 mm*       Chloride                    107               96 - 108 mmo*       CO2                         27                21 - 32 mmol*       ANION GAP                   5                 4 - 13 mmol/L       BUN                         21                5 - 25 mg/dL        Creatinine                  1 34 (H)          0 60 - 1 30 *       Glucose, Fasting            98                65 - 99 mg/dL       Calcium                     8 8               8 3 - 10 1 m*       AST                         13                5 - 45 U/L          ALT                         26                12 - 78 U/L         Alkaline Phosphatase        67                46 - 116 U/L        Total Protein               7 0               6 4 - 8 4 g/*       Albumin                     3 6               3 5 - 5 0 g/*       Total Bilirubin             0 58              0 20 - 1 00 *       eGFR                        53                Ml/min/1 73s*    -Lipid Panel with Direct LDL reflex:   Collection Time: 09/30/22  7:49 AM       Result                      Value             Ref Range           Cholesterol                 162               See Comment *       Triglycerides               131               See Comment *       HDL, Direct                 40                >=40 mg/dL          LDL Calculated              96                0 - 100 mg/dL       Patient Care Team:  Samson Raines PA-C as PCP - General (Family Medicine)  Ted Hartley MD (Urology)  Ronnie Johnston MD as Consulting Physician (Nephrology)  Agata Sales PA-C as Physician Assistant (Physician Assistant)     Review of Systems:     Review of Systems   Constitutional: Negative for chills, fatigue and fever  HENT: Negative for congestion, ear pain, hearing loss, nosebleeds, postnasal drip, rhinorrhea, sinus pressure, sinus pain, sneezing and sore throat  Eyes: Negative for pain, discharge, itching and visual disturbance  Respiratory: Negative for cough, chest tightness, shortness of breath and wheezing      Cardiovascular: Negative for chest pain, palpitations and leg swelling  Gastrointestinal: Negative for abdominal pain, blood in stool, constipation, diarrhea, nausea and vomiting  Genitourinary: Negative for frequency and urgency  Musculoskeletal: Negative  Skin: Negative  Neurological: Negative for dizziness, light-headedness and numbness  Psychiatric/Behavioral: Negative           Problem List:     Patient Active Problem List   Diagnosis    Medicare annual wellness visit, subsequent    Essential hypertension    Hyperlipidemia, mixed    Prostate cancer ([place] [address] )    Psoriasis    Stage 3 chronic kidney disease ([place] [address] )      Past Medical and Surgical History:     Past Medical History:   Diagnosis Date    Arthritis     Cancer ([facility] )     prostate    Hyperlipidemia     Hypertension     OCD (obsessive compulsive disorder)      Past Surgical History:   Procedure Laterality Date    FINGER AMPUTATION Left     partial middle and ring    FL RETROGRADE PYELOGRAM  04/05/2019    FL RETROGRADE PYELOGRAM  05/09/2019    KNEE ARTHROSCOPY Right     acl    KNEE SURGERY Right     menisectomy    GA CYSTO/URETERO W/LITHOTRIPSY &INDWELL STENT INSRT Left 04/05/2019    Procedure: CYSTOSCOPY URETEROSCOPY WITH  INSERTION STENT URETERAL;  Surgeon: Vic Bhat MD;  [facility];  Service: Urology    GA CYSTO/URETERO W/LITHOTRIPSY &INDWELL STENT INSRT Left 05/09/2019    Procedure: CYSTOSCOPY URETEROSCOPY WITH LITHOTRIPSY HOLMIUM LASER, RETROGRADE PYELOGRAM AND INSERTION STENT URETERAL;  Surgeon: Vic Bhat MD;  [facility];  Service: Urology    GA LAP,PROSTATECTOMY,RADICAL,W/NERVE SPARE,INCL ROBOTIC N/A 03/18/2019    Procedure: ROBOTIC RADICAL PROSTATECTOMY; BILATERAL PELVIC LYMPH NODE DISSECTION;  Surgeon: Vic Baht MD;  [facility];  Service: Urology      Family History:     Family History   Problem Relation Age of Onset    No Known Problems Mother     No Known Problems Father     Cancer Sister    [facility] No Known Problems Brother       Social History:     Social History     Socioeconomic History    Marital status: /Civil Union     Spouse name: None    Number of children: None    Years of education: None    Highest education level: None   Occupational History    None   Tobacco Use    Smoking status: Former Smoker    Smokeless tobacco: Never Used    Tobacco comment: 30+ years ago   Vaping Use    Vaping Use: Never used   Substance and Sexual Activity    Alcohol use: No    Drug use: No    Sexual activity: None   Other Topics Concern    None   Social History Narrative    None     Social Determinants of Health     Financial Resource Strain: Low Risk     Difficulty of Paying Living Expenses: Not hard at all   Food Insecurity: Not on file   Transportation Needs: No Transportation Needs    Lack of Transportation (Medical): No    Lack of Transportation (Non-Medical): No   Physical Activity: Not on file   Stress: Not on file   Social Connections: Not on file   Intimate Partner Violence: Not on file   Housing Stability: Not on file      Medications and Allergies:     Current Outpatient Medications   Medication Sig Dispense Refill    co-enzyme Q-10 30 MG capsule Take 200 mg by mouth daily      FLUoxetine (PROzac) 20 mg capsule Take 1 capsule (20 mg total) by mouth 2 (two) times a day 180 capsule 1    lisinopril (ZESTRIL) 10 mg tablet Take 1 tablet (10 mg total) by mouth daily 90 tablet 0    rosuvastatin (CRESTOR) 10 MG tablet Take 1 tablet (10 mg total) by mouth daily 90 tablet 1    sildenafil (REVATIO) 20 mg tablet Take 2-3 tablets by mouth nightly 60 tablet 6     No current facility-administered medications for this visit       No Known Allergies   Immunizations:     Immunization History   Administered Date(s) Administered    COVID-19 MODERNA VACC 0 5 ML IM 02/12/2021, 03/12/2021, 10/27/2021    Tdap 07/31/2017      Health Maintenance:         Topic Date Due    Hepatitis C Screening  Never done  Colorectal Cancer Screening  09/12/2025         Topic Date Due    Pneumococcal Vaccine: 65+ Years (1 - PCV) Never done    COVID-19 Vaccine (4 - Booster for Moderna series) 02/27/2022    Influenza Vaccine (1) Never done      Medicare Screening Tests and Risk Assessments:     Tamera Lin is here for his Subsequent Wellness visit  Health Risk Assessment:   Patient rates overall health as very good  Patient feels that their physical health rating is same  Patient is very satisfied with their life  Eyesight was rated as same  Hearing was rated as same  Patient feels that their emotional and mental health rating is same  Patients states they are never, rarely angry  Patient states they are sometimes unusually tired/fatigued  Pain experienced in the last 7 days has been some  Patient's pain rating has been 3/10  Patient states that he has experienced no weight loss or gain in last 6 months  Depression Screening:   PHQ-2 Score: 0      Fall Risk Screening: In the past year, patient has experienced: no history of falling in past year      Home Safety:  Patient does not have trouble with stairs inside or outside of their home  Patient has working smoke alarms and has working carbon monoxide detector  Home safety hazards include: none  Nutrition:   Current diet is Low Carb and Limited junk food  Medications:   Patient is not currently taking any over-the-counter supplements  Patient is able to manage medications  Activities of Daily Living (ADLs)/Instrumental Activities of Daily Living (IADLs):   Walk and transfer into and out of bed and chair?: Yes  Dress and groom yourself?: Yes    Bathe or shower yourself?: Yes    Feed yourself?  Yes  Do your laundry/housekeeping?: Yes  Manage your money, pay your bills and track your expenses?: Yes  Make your own meals?: Yes    Do your own shopping?: Yes    Previous Hospitalizations:   Any hospitalizations or ED visits within the last 12 months?: No      Advance Care Planning:   Living will: No    Durable POA for healthcare: No    Advanced directive: No    Advanced directive counseling given: Yes    Five wishes given: No      PREVENTIVE SCREENINGS      Cardiovascular Screening:    General: Screening Not Indicated and History Lipid Disorder      Diabetes Screening:     General: Screening Current      Colorectal Cancer Screening:     General: Screening Current      Prostate Cancer Screening:    General: History Prostate Cancer and Screening Not Indicated      Osteoporosis Screening:    General: Screening Not Indicated      Abdominal Aortic Aneurysm (AAA) Screening:    Risk factors include: age between 73-67 yo and tobacco use        Lung Cancer Screening:     General: Screening Not Indicated      Hepatitis C Screening:    General: Risks and Benefits Discussed and Screening Not Indicated    Screening, Brief Intervention, and Referral to Treatment (SBIRT)    Screening  Typical number of drinks in a day: 0  Typical number of drinks in a week: 0  Interpretation: Low risk drinking behavior      AUDIT-C Screenin) How often did you have a drink containing alcohol in the past year? never  2) How many drinks did you have on a typical day when you were drinking in the past year? 0  3) How often did you have 6 or more drinks on one occasion in the past year? never    AUDIT-C Score: 0  Interpretation: Score 0-3 (male): Negative screen for alcohol misuse    Single Item Drug Screening:  How often have you used an illegal drug (including marijuana) or a prescription medication for non-medical reasons in the past year? never    Single Item Drug Screen Score: 0  Interpretation: Negative screen for possible drug use disorder    No exam data present     Physical Exam:     /78 (BP Location: Left arm, Patient Position: Sitting, Cuff Size: Large)   Pulse 58   Temp 98 4 °F (36 9 °C)   Ht 5' 9" (1 753 m)   Wt 83 9 kg (185 lb)   SpO2 98%   BMI 27 32 kg/m²     Physical Exam  Vitals and nursing note reviewed  Constitutional:       Appearance: He is well-developed  HENT:      Head: Normocephalic and atraumatic  Right Ear: Tympanic membrane, ear canal and external ear normal       Left Ear: Tympanic membrane, ear canal and external ear normal       Nose: Nose normal       Mouth/Throat:      Mouth: Mucous membranes are moist       Pharynx: Oropharynx is clear  No oropharyngeal exudate or posterior oropharyngeal erythema  Eyes:      Conjunctiva/sclera: Conjunctivae normal    Cardiovascular:      Rate and Rhythm: Normal rate and regular rhythm  Heart sounds: No murmur heard  Pulmonary:      Effort: Pulmonary effort is normal  No respiratory distress  Breath sounds: Normal breath sounds  No wheezing, rhonchi or rales  Abdominal:      Palpations: Abdomen is soft  Tenderness: There is no abdominal tenderness  Musculoskeletal:         General: Normal range of motion  Cervical back: Normal range of motion and neck supple  Right lower leg: No edema  Left lower leg: No edema  Lymphadenopathy:      Cervical: No cervical adenopathy  Skin:     General: Skin is warm and dry  Neurological:      Mental Status: He is alert and oriented to person, place, and time            Zcak Rodrigues PA-C

## 2022-10-03 NOTE — PATIENT INSTRUCTIONS
Medicare Preventive Visit Patient Instructions  Thank you for completing your Welcome to Medicare Visit or Medicare Annual Wellness Visit today  Your next wellness visit will be due in one year (10/4/2023)  The screening/preventive services that you may require over the next 5-10 years are detailed below  Some tests may not apply to you based off risk factors and/or age  Screening tests ordered at today's visit but not completed yet may show as past due  Also, please note that scanned in results may not display below  Preventive Screenings:  Service Recommendations Previous Testing/Comments   Colorectal Cancer Screening  · Colonoscopy    · Fecal Occult Blood Test (FOBT)/Fecal Immunochemical Test (FIT)  · Fecal DNA/Cologuard Test  · Flexible Sigmoidoscopy Age: 39-70 years old   Colonoscopy: every 10 years (May be performed more frequently if at higher risk)  OR  FOBT/FIT: every 1 year  OR  Cologuard: every 3 years  OR  Sigmoidoscopy: every 5 years  Screening may be recommended earlier than age 39 if at higher risk for colorectal cancer  Also, an individualized decision between you and your healthcare provider will decide whether screening between the ages of 74-80 would be appropriate   Colonoscopy: 09/13/2022  FOBT/FIT: Not on file  Cologuard: 06/10/2022  Sigmoidoscopy: Not on file    Screening Current     Prostate Cancer Screening Individualized decision between patient and health care provider in men between ages of 53-78   Medicare will cover every 12 months beginning on the day after your 50th birthday PSA: <0 1 ng/mL     History Prostate Cancer     Hepatitis C Screening Once for adults born between 1945 and 1965  More frequently in patients at high risk for Hepatitis C Hep C Antibody: Not on file        Diabetes Screening 1-2 times per year if you're at risk for diabetes or have pre-diabetes Fasting glucose: 98 mg/dL (9/30/2022)  A1C: No results in last 5 years (No results in last 5 years)  Screening Current Cholesterol Screening Once every 5 years if you don't have a lipid disorder  May order more often based on risk factors  Lipid panel: 09/30/2022  Screening Not Indicated  History Lipid Disorder      Other Preventive Screenings Covered by Medicare:  1  Abdominal Aortic Aneurysm (AAA) Screening: covered once if your at risk  You're considered to be at risk if you have a family history of AAA or a male between the age of 73-68 who smoking at least 100 cigarettes in your lifetime  2  Lung Cancer Screening: covers low dose CT scan once per year if you meet all of the following conditions: (1) Age 50-69; (2) No signs or symptoms of lung cancer; (3) Current smoker or have quit smoking within the last 15 years; (4) You have a tobacco smoking history of at least 20 pack years (packs per day x number of years you smoked); (5) You get a written order from a healthcare provider  3  Glaucoma Screening: covered annually if you're considered high risk: (1) You have diabetes OR (2) Family history of glaucoma OR (3)  aged 48 and older OR (3)  American aged 72 and older  3  Osteoporosis Screening: covered every 2 years if you meet one of the following conditions: (1) Have a vertebral abnormality; (2) On glucocorticoid therapy for more than 3 months; (3) Have primary hyperparathyroidism; (4) On osteoporosis medications and need to assess response to drug therapy  5  HIV Screening: covered annually if you're between the age of 12-76  Also covered annually if you are younger than 13 and older than 72 with risk factors for HIV infection  For pregnant patients, it is covered up to 3 times per pregnancy      Immunizations:  Immunization Recommendations   Influenza Vaccine Annual influenza vaccination during flu season is recommended for all persons aged >= 6 months who do not have contraindications   Pneumococcal Vaccine   * Pneumococcal conjugate vaccine = PCV13 (Prevnar 13), PCV15 (Vaxneuvance), PCV20 (Prevnar 20)  * Pneumococcal polysaccharide vaccine = PPSV23 (Pneumovax) Adults 2364 years old: 1-3 doses may be recommended based on certain risk factors  Adults 72 years old: 1-2 doses may be recommended based off what pneumonia vaccine you previously received   Hepatitis B Vaccine 3 dose series if at intermediate or high risk (ex: diabetes, end stage renal disease, liver disease)   Tetanus (Td) Vaccine - COST NOT COVERED BY MEDICARE PART B Following completion of primary series, a booster dose should be given every 10 years to maintain immunity against tetanus  Td may also be given as tetanus wound prophylaxis  Tdap Vaccine - COST NOT COVERED BY MEDICARE PART B Recommended at least once for all adults  For pregnant patients, recommended with each pregnancy  Shingles Vaccine (Shingrix) - COST NOT COVERED BY MEDICARE PART B  2 shot series recommended in those aged 48 and above     Health Maintenance Due:      Topic Date Due    Hepatitis C Screening  Never done    Colorectal Cancer Screening  09/12/2025     Immunizations Due:      Topic Date Due    Pneumococcal Vaccine: 65+ Years (1 - PCV) Never done    COVID-19 Vaccine (4 - Booster for Moderna series) 02/27/2022    Influenza Vaccine (1) Never done     Advance Directives   What are advance directives? Advance directives are legal documents that state your wishes and plans for medical care  These plans are made ahead of time in case you lose your ability to make decisions for yourself  Advance directives can apply to any medical decision, such as the treatments you want, and if you want to donate organs  What are the types of advance directives? There are many types of advance directives, and each state has rules about how to use them  You may choose a combination of any of the following:  · Living will: This is a written record of the treatment you want  You can also choose which treatments you do not want, which to limit, and which to stop at a certain time  This includes surgery, medicine, IV fluid, and tube feedings  · Durable power of  for healthcare [facility]): This is a written record that states who you want to make healthcare choices for you when you are unable to make them for yourself  This person, called a proxy, is usually a family member or a friend  You may choose more than 1 proxy  · Do not resuscitate (DNR) order:  A DNR order is used in case your heart stops beating or you stop breathing  It is a request not to have certain forms of treatment, such as CPR  A DNR order may be included in other types of advance directives  · Medical directive: This covers the care that you want if you are in a coma, near death, or unable to make decisions for yourself  You can list the treatments you want for each condition  Treatment may include pain medicine, surgery, blood transfusions, dialysis, IV or tube feedings, and a ventilator (breathing machine)  · Values history: This document has questions about your views, beliefs, and how you feel and think about life  This information can help others choose the care that you would choose  Why are advance directives important? An advance directive helps you control your care  Although spoken wishes may be used, it is better to have your wishes written down  Spoken wishes can be misunderstood, or not followed  Treatments may be given even if you do not want them  An advance directive may make it easier for your family to make difficult choices about your care  Weight Management   Why it is important to manage your weight:  Being overweight increases your risk of health conditions such as heart disease, high blood pressure, type 2 diabetes, and certain types of cancer  It can also increase your risk for osteoarthritis, sleep apnea, and other respiratory problems  Aim for a slow, steady weight loss  Even a small amount of weight loss can lower your risk of health problems    How to lose weight safely:  A safe and healthy way to lose weight is to eat fewer calories and get regular exercise  You can lose up about 1 pound a week by decreasing the number of calories you eat by 500 calories each day  Healthy meal plan for weight management:  A healthy meal plan includes a variety of foods, contains fewer calories, and helps you stay healthy  A healthy meal plan includes the following:  · Eat whole-grain foods more often  A healthy meal plan should contain fiber  Fiber is the part of grains, fruits, and vegetables that is not broken down by your body  Whole-grain foods are healthy and provide extra fiber in your diet  Some examples of whole-grain foods are whole-wheat breads and pastas, oatmeal, brown rice, and bulgur  · Eat a variety of vegetables every day  Include dark, leafy greens such as spinach, kale, carlene greens, and mustard greens  Eat yellow and orange vegetables such as carrots, sweet potatoes, and winter squash  · Eat a variety of fruits every day  Choose fresh or canned fruit (canned in its own juice or light syrup) instead of juice  Fruit juice has very little or no fiber  · Eat low-fat dairy foods  Drink fat-free (skim) milk or 1% milk  Eat fat-free yogurt and low-fat cottage cheese  Try low-fat cheeses such as mozzarella and other reduced-fat cheeses  · Choose meat and other protein foods that are low in fat  Choose beans or other legumes such as split peas or lentils  Choose fish, skinless poultry (chicken or turkey), or lean cuts of red meat (beef or pork)  Before you cook meat or poultry, cut off any visible fat  · Use less fat and oil  Try baking foods instead of frying them  Add less fat, such as margarine, sour cream, regular salad dressing and mayonnaise to foods  Eat fewer high-fat foods  Some examples of high-fat foods include french fries, doughnuts, ice cream, and cakes  · Eat fewer sweets  Limit foods and drinks that are high in sugar   This includes candy, cookies, regular soda, and sweetened drinks  Exercise:  Exercise at least 30 minutes per day on most days of the week  Some examples of exercise include walking, biking, dancing, and swimming  You can also fit in more physical activity by taking the stairs instead of the elevator or parking farther away from stores  Ask your healthcare provider about the best exercise plan for you  © Copyright [employer] 2018 Information is for End User's use only and may not be sold, redistributed or otherwise used for commercial purposes   All illustrations and images included in CareNotes® are the copyrighted property of [employer]  or [address] [place] [address] [employer]

## 2022-12-19 DIAGNOSIS — I10 ESSENTIAL HYPERTENSION: ICD-10-CM

## 2022-12-19 RX ORDER — LISINOPRIL 10 MG/1
TABLET ORAL
Qty: 90 TABLET | Refills: 0 | Status: SHIPPED | OUTPATIENT
Start: 2022-12-19

## 2023-01-20 DIAGNOSIS — F42.8 OTHER OBSESSIVE-COMPULSIVE DISORDERS: ICD-10-CM

## 2023-01-20 RX ORDER — FLUOXETINE HYDROCHLORIDE 20 MG/1
20 CAPSULE ORAL 2 TIMES DAILY
Qty: 180 CAPSULE | Refills: 0 | Status: SHIPPED | OUTPATIENT
Start: 2023-01-20

## 2023-01-30 DIAGNOSIS — E78.2 HYPERLIPIDEMIA, MIXED: ICD-10-CM

## 2023-01-30 RX ORDER — ROSUVASTATIN CALCIUM 10 MG/1
10 TABLET, COATED ORAL DAILY
Qty: 90 TABLET | Refills: 0 | Status: SHIPPED | OUTPATIENT
Start: 2023-01-30

## 2023-03-01 DIAGNOSIS — I10 ESSENTIAL HYPERTENSION: ICD-10-CM

## 2023-03-01 RX ORDER — LISINOPRIL 10 MG/1
10 TABLET ORAL DAILY
Qty: 90 TABLET | Refills: 0 | Status: SHIPPED | OUTPATIENT
Start: 2023-03-01

## 2023-05-04 DIAGNOSIS — E78.2 HYPERLIPIDEMIA, MIXED: ICD-10-CM

## 2023-05-04 RX ORDER — ROSUVASTATIN CALCIUM 10 MG/1
10 TABLET, COATED ORAL DAILY
Qty: 90 TABLET | Refills: 0 | Status: SHIPPED | OUTPATIENT
Start: 2023-05-04

## 2023-05-24 DIAGNOSIS — I10 ESSENTIAL HYPERTENSION: ICD-10-CM

## 2023-05-24 RX ORDER — LISINOPRIL 10 MG/1
10 TABLET ORAL DAILY
Qty: 90 TABLET | Refills: 0 | Status: SHIPPED | OUTPATIENT
Start: 2023-05-24

## 2023-06-30 ENCOUNTER — RA CDI HCC (OUTPATIENT)
Dept: OTHER | Facility: HOSPITAL | Age: 73
End: 2023-06-30

## 2023-06-30 NOTE — PROGRESS NOTES
[facility]  coding opportunities       Chart reviewed, no opportunity found:   [address]        Patients Insurance     Medicare Insurance: [employer] Advantage

## 2023-08-07 DIAGNOSIS — F42.8 OTHER OBSESSIVE-COMPULSIVE DISORDERS: ICD-10-CM

## 2023-08-08 RX ORDER — FLUOXETINE HYDROCHLORIDE 20 MG/1
20 CAPSULE ORAL 2 TIMES DAILY
Qty: 180 CAPSULE | Refills: 0 | Status: SHIPPED | OUTPATIENT
Start: 2023-08-08

## 2023-08-11 ENCOUNTER — APPOINTMENT (OUTPATIENT)
Dept: LAB | Facility: CLINIC | Age: 73
End: 2023-08-11
Payer: COMMERCIAL

## 2023-08-11 DIAGNOSIS — N18.31 STAGE 3A CHRONIC KIDNEY DISEASE (HCC): ICD-10-CM

## 2023-08-11 DIAGNOSIS — I10 ESSENTIAL HYPERTENSION: ICD-10-CM

## 2023-08-11 DIAGNOSIS — E78.2 HYPERLIPIDEMIA, MIXED: ICD-10-CM

## 2023-08-11 DIAGNOSIS — C61 PROSTATE CANCER (HCC): ICD-10-CM

## 2023-08-11 LAB
ALBUMIN SERPL BCP-MCNC: 3.8 G/DL (ref 3.5–5)
ALP SERPL-CCNC: 57 U/L (ref 46–116)
ALT SERPL W P-5'-P-CCNC: 32 U/L (ref 12–78)
ANION GAP SERPL CALCULATED.3IONS-SCNC: 7 MMOL/L
AST SERPL W P-5'-P-CCNC: 20 U/L (ref 5–45)
BILIRUB SERPL-MCNC: 0.84 MG/DL (ref 0.2–1)
BUN SERPL-MCNC: 20 MG/DL (ref 5–25)
CALCIUM SERPL-MCNC: 9.7 MG/DL (ref 8.3–10.1)
CHLORIDE SERPL-SCNC: 106 MMOL/L (ref 96–108)
CHOLEST SERPL-MCNC: 201 MG/DL
CO2 SERPL-SCNC: 24 MMOL/L (ref 21–32)
CREAT SERPL-MCNC: 1.28 MG/DL (ref 0.6–1.3)
GFR SERPL CREATININE-BSD FRML MDRD: 55 ML/MIN/1.73SQ M
GLUCOSE P FAST SERPL-MCNC: 90 MG/DL (ref 65–99)
HDLC SERPL-MCNC: 40 MG/DL
LDLC SERPL CALC-MCNC: 138 MG/DL (ref 0–100)
POTASSIUM SERPL-SCNC: 4.6 MMOL/L (ref 3.5–5.3)
PROT SERPL-MCNC: 7.1 G/DL (ref 6.4–8.4)
PSA SERPL-MCNC: <0.01 NG/ML (ref 0–4)
SODIUM SERPL-SCNC: 137 MMOL/L (ref 135–147)
TRIGL SERPL-MCNC: 115 MG/DL

## 2023-08-11 PROCEDURE — 36415 COLL VENOUS BLD VENIPUNCTURE: CPT

## 2023-08-11 PROCEDURE — G0103 PSA SCREENING: HCPCS

## 2023-08-11 PROCEDURE — 80053 COMPREHEN METABOLIC PANEL: CPT

## 2023-08-11 PROCEDURE — 80061 LIPID PANEL: CPT

## 2023-08-14 ENCOUNTER — OFFICE VISIT (OUTPATIENT)
Dept: FAMILY MEDICINE CLINIC | Facility: CLINIC | Age: 73
End: 2023-08-14
Payer: COMMERCIAL

## 2023-08-14 VITALS
SYSTOLIC BLOOD PRESSURE: 122 MMHG | WEIGHT: 186 LBS | TEMPERATURE: 95.7 F | DIASTOLIC BLOOD PRESSURE: 80 MMHG | HEART RATE: 59 BPM | BODY MASS INDEX: 27.55 KG/M2 | HEIGHT: 69 IN | OXYGEN SATURATION: 97 %

## 2023-08-14 DIAGNOSIS — N18.31 STAGE 3A CHRONIC KIDNEY DISEASE (HCC): ICD-10-CM

## 2023-08-14 DIAGNOSIS — E78.2 HYPERLIPIDEMIA, MIXED: ICD-10-CM

## 2023-08-14 DIAGNOSIS — I10 ESSENTIAL HYPERTENSION: ICD-10-CM

## 2023-08-14 DIAGNOSIS — C61 PROSTATE CANCER (HCC): ICD-10-CM

## 2023-08-14 PROCEDURE — 99214 OFFICE O/P EST MOD 30 MIN: CPT | Performed by: PHYSICIAN ASSISTANT

## 2023-08-14 RX ORDER — ROSUVASTATIN CALCIUM 10 MG/1
10 TABLET, COATED ORAL DAILY
Qty: 90 TABLET | Refills: 3 | Status: SHIPPED | OUTPATIENT
Start: 2023-08-14

## 2023-08-14 RX ORDER — LISINOPRIL 10 MG/1
10 TABLET ORAL DAILY
Qty: 90 TABLET | Refills: 3 | Status: SHIPPED | OUTPATIENT
Start: 2023-08-14

## 2023-08-14 NOTE — PROGRESS NOTES
Name: Madhu Lawson      : 1950      MRN: [id number]  Encounter Provider: Rosario Taylor PA-C  Encounter Date: 2023   Encounter department: [address] [place] [address]     1. Hyperlipidemia, mixed  -     rosuvastatin (CRESTOR) 10 MG tablet; Take 1 tablet (10 mg total) by mouth daily    2. Essential hypertension  -     lisinopril (ZESTRIL) 10 mg tablet; Take 1 tablet (10 mg total) by mouth daily    3. Prostate cancer ([facility])    4. Stage 3a chronic kidney disease (HCC)    BP at goal, continue lisinopril. Kidney function has remained stable on ACE. Lipids increased, restart statin. Normal PSA. No medication changes today. Return in 2 months for AWV       Subjective     Pt presents for follow up and lab review. No acute concerns today. He overall is feeling well. He stays physically active.     HTN: well controlled on lisinopril, 122/80, no end organ complaints.    HLD: was not taking statin for a while, lipids increased  OCD: controlled on prozac BID  Hx of prostate cancer, hx of prostatectomy, PSA being followed    Recent Results (from the past 672 hour(s))  -Comprehensive metabolic panel:   Collection Time: 23  7:01 AM       Result                      Value             Ref Range           Sodium                      137               135 - 147 mm*       Potassium                   4.6               3.5 - 5.3 mm*       Chloride                    106               96 - 108 mmo*       CO2                         24                21 - 32 mmol*       ANION GAP                   7                 mmol/L              BUN                         20                5 - 25 mg/dL        Creatinine                  1.28              0.60 - 1.30 *       Glucose, Fasting            90                65 - 99 mg/dL       Calcium                     9.7               8.3 - 10.1 m*       AST                         20                5 - 45 U/L          ALT                         32 12 - 78 U/L         Alkaline Phosphatase        57                46 - 116 U/L        Total Protein               7.1               6.4 - 8.4 g/*       Albumin                     3.8               3.5 - 5.0 g/*       Total Bilirubin             0.84              0.20 - 1.00 *       eGFR                        55                ml/min/1.73s*  -Lipid Panel with Direct LDL reflex:   Collection Time: 08/11/23  7:01 AM       Result                      Value             Ref Range           Cholesterol                 201 (H)           See Comment *       Triglycerides               115               See Comment *       HDL, Direct                 40                >=40 mg/dL          LDL Calculated              138 (H)           0 - 100 mg/dL  -PSA, Total Screen:   Collection Time: 08/11/23  7:01 AM       Result                      Value             Ref Range           PSA                         <0.01             0.00 - 4.00 *      Review of Systems   Constitutional: Negative for chills, fatigue and fever. HENT: Negative for congestion, ear pain, hearing loss, nosebleeds, postnasal drip, rhinorrhea, sinus pressure, sinus pain, sneezing and sore throat. Eyes: Negative for pain, discharge, itching and visual disturbance. Respiratory: Negative for cough, chest tightness, shortness of breath and wheezing. Cardiovascular: Negative for chest pain, palpitations and leg swelling. Gastrointestinal: Negative for abdominal pain, blood in stool, constipation, diarrhea, nausea and vomiting. Genitourinary: Negative for frequency and urgency. Musculoskeletal: Negative. Skin: Negative. Neurological: Negative for dizziness, light-headedness and numbness. Psychiatric/Behavioral: Negative.         Past Medical History:   Diagnosis Date   • Arthritis    • Cancer [facility])     prostate   • Hyperlipidemia    • Hypertension    • OCD (obsessive compulsive disorder)      Past Surgical History:   Procedure Laterality Date   • FINGER AMPUTATION Left     partial middle and ring   • FL RETROGRADE PYELOGRAM  04/05/2019   • FL RETROGRADE PYELOGRAM  05/09/2019   • KNEE ARTHROSCOPY Right     acl   • KNEE SURGERY Right     menisectomy   • AR CYSTO/URETERO W/LITHOTRIPSY &INDWELL STENT INSRT Left 04/05/2019    Procedure: CYSTOSCOPY URETEROSCOPY WITH  INSERTION STENT URETERAL;  Surgeon: Tyrese Chase MD;  [facility];  Service: Urology   • AR CYSTO/URETERO W/LITHOTRIPSY &INDWELL STENT INSRT Left 05/09/2019    Procedure: CYSTOSCOPY URETEROSCOPY WITH LITHOTRIPSY HOLMIUM LASER, RETROGRADE PYELOGRAM AND INSERTION STENT URETERAL;  Surgeon: Tyrese Chase MD;  [facility];  Service: Urology   • AR LAPS SURG [id number] RPBIC RAD W/NRV SPARING ROBOT N/A 03/18/2019    Procedure: ROBOTIC RADICAL PROSTATECTOMY; BILATERAL PELVIC LYMPH NODE DISSECTION;  Surgeon: Tyrese Chase MD;  [facility];  Service: Urology     Family History   Problem Relation Age of Onset   • No Known Problems Mother    • No Known Problems Father    • Cancer Sister    • No Known Problems Brother      Social History     Socioeconomic History   • Marital status: /Civil Union     Spouse name: None   • Number of children: None   • Years of education: None   • Highest education level: None   Occupational History   • None   Tobacco Use   • Smoking status: Former   • Smokeless tobacco: Never   • Tobacco comments:     30+ years ago   Vaping Use   • Vaping Use: Never used   Substance and Sexual Activity   • Alcohol use: No   • Drug use: No   • Sexual activity: None   Other Topics Concern   • None   Social History Narrative   • None     Social Determinants of Health     Financial Resource Strain: Low Risk  (9/30/2022)    Overall Financial Resource Strain (CARDIA)    • Difficulty of Paying Living Expenses: Not hard at all   Food Insecurity: Not on file   Transportation Needs: No Transportation Needs (9/30/2022)    [facility] [employer] [facility]    • Lack of Transportation (Medical): No    • Lack of Transportation (Non-Medical): No   Physical Activity: Not on file   Stress: Not on file   Social Connections: Not on file   Intimate Partner Violence: Not on file   Housing Stability: Not on file     Current Outpatient Medications on File Prior to Visit   Medication Sig   • co-enzyme Q-10 30 MG capsule Take 200 mg by mouth daily   • FLUoxetine (PROzac) 20 mg capsule Take 1 capsule (20 mg total) by mouth 2 (two) times a day   • sildenafil (REVATIO) 20 mg tablet Take 2-3 tablets by mouth nightly   • [DISCONTINUED] lisinopril (ZESTRIL) 10 mg tablet Take 1 tablet (10 mg total) by mouth daily   • [DISCONTINUED] rosuvastatin (CRESTOR) 10 MG tablet Take 1 tablet (10 mg total) by mouth daily     No Known Allergies  Immunization History   Administered Date(s) Administered   • COVID-19 MODERNA VACC 0.5 ML IM 02/12/2021, 03/12/2021, 10/27/2021   • Tdap 07/31/2017       Objective     /80 (BP Location: Left arm, Patient Position: Sitting, Cuff Size: Adult)   Pulse 59   Temp (!) 95.7 °F (35.4 °C)   Ht 5' 9" (1.753 m)   Wt 84.4 kg (186 lb)   SpO2 97%   BMI 27.47 kg/m²     Physical Exam  Vitals and nursing note reviewed. Constitutional:       General: He is not in acute distress. Appearance: Normal appearance. HENT:      Head: Normocephalic and atraumatic. Right Ear: Tympanic membrane normal.      Left Ear: Tympanic membrane normal.      Nose: Nose normal.      Mouth/Throat:      Mouth: Mucous membranes are moist.      Pharynx: Oropharynx is clear. No oropharyngeal exudate or posterior oropharyngeal erythema. Eyes:      Pupils: Pupils are equal, round, and reactive to light. Cardiovascular:      Rate and Rhythm: Normal rate and regular rhythm. Heart sounds: Normal heart sounds. Pulmonary:      Effort: Pulmonary effort is normal. No respiratory distress. Breath sounds: Normal breath sounds. No wheezing, rhonchi or rales.    Abdominal:      General: Bowel sounds are normal.      Palpations: Abdomen is soft. Musculoskeletal:         General: Normal range of motion. Cervical back: Normal range of motion and neck supple. Right lower leg: No edema. Left lower leg: No edema. Lymphadenopathy:      Cervical: No cervical adenopathy. Skin:     General: Skin is warm and dry. Neurological:      Mental Status: He is alert and oriented to person, place, and time.    Psychiatric:         Mood and Affect: Mood and affect normal.       Lady Krueger PA-C

## 2023-09-29 ENCOUNTER — RA CDI HCC (OUTPATIENT)
Dept: OTHER | Facility: HOSPITAL | Age: 73
End: 2023-09-29

## 2023-09-29 NOTE — PROGRESS NOTES
[address] [facility] coding opportunities       Chart reviewed, no opportunity found: [address]        Patients Insurance     Medicare Insurance: [employer] Advantage

## 2023-11-17 DIAGNOSIS — I10 ESSENTIAL HYPERTENSION: ICD-10-CM

## 2023-11-17 RX ORDER — LISINOPRIL 10 MG/1
10 TABLET ORAL DAILY
Qty: 90 TABLET | Refills: 0 | Status: SHIPPED | OUTPATIENT
Start: 2023-11-17

## 2023-12-19 DIAGNOSIS — F42.8 OTHER OBSESSIVE-COMPULSIVE DISORDERS: ICD-10-CM

## 2023-12-19 RX ORDER — FLUOXETINE HYDROCHLORIDE 20 MG/1
20 CAPSULE ORAL 2 TIMES DAILY
Qty: 180 CAPSULE | Refills: 0 | Status: SHIPPED | OUTPATIENT
Start: 2023-12-19

## 2024-02-07 DIAGNOSIS — E78.2 HYPERLIPIDEMIA, MIXED: ICD-10-CM

## 2024-02-07 DIAGNOSIS — I10 ESSENTIAL HYPERTENSION: ICD-10-CM

## 2024-02-07 RX ORDER — LISINOPRIL 10 MG/1
10 TABLET ORAL DAILY
Qty: 30 TABLET | Refills: 0 | Status: SHIPPED | OUTPATIENT
Start: 2024-02-07

## 2024-02-07 RX ORDER — ROSUVASTATIN CALCIUM 10 MG/1
10 TABLET, COATED ORAL DAILY
Qty: 30 TABLET | Refills: 0 | Status: SHIPPED | OUTPATIENT
Start: 2024-02-07

## 2024-02-12 ENCOUNTER — RA CDI HCC (OUTPATIENT)
Dept: OTHER | Facility: HOSPITAL | Age: 74
End: 2024-02-12

## 2024-02-20 ENCOUNTER — OFFICE VISIT (OUTPATIENT)
Dept: FAMILY MEDICINE CLINIC | Facility: CLINIC | Age: 74
End: 2024-02-20
Payer: COMMERCIAL

## 2024-02-20 VITALS
SYSTOLIC BLOOD PRESSURE: 122 MMHG | WEIGHT: 184 LBS | BODY MASS INDEX: 27.25 KG/M2 | HEART RATE: 68 BPM | OXYGEN SATURATION: 100 % | TEMPERATURE: 97.8 F | DIASTOLIC BLOOD PRESSURE: 78 MMHG | HEIGHT: 69 IN

## 2024-02-20 DIAGNOSIS — N18.31 STAGE 3A CHRONIC KIDNEY DISEASE (HCC): ICD-10-CM

## 2024-02-20 DIAGNOSIS — Z00.00 MEDICARE ANNUAL WELLNESS VISIT, SUBSEQUENT: ICD-10-CM

## 2024-02-20 DIAGNOSIS — Z85.828 HISTORY OF BASAL CELL CARCINOMA: ICD-10-CM

## 2024-02-20 DIAGNOSIS — I10 ESSENTIAL HYPERTENSION: Primary | ICD-10-CM

## 2024-02-20 DIAGNOSIS — Z11.59 ENCOUNTER FOR HEPATITIS C SCREENING TEST FOR LOW RISK PATIENT: ICD-10-CM

## 2024-02-20 DIAGNOSIS — E78.2 HYPERLIPIDEMIA, MIXED: ICD-10-CM

## 2024-02-20 DIAGNOSIS — C61 PROSTATE CANCER (HCC): ICD-10-CM

## 2024-02-20 PROCEDURE — 99214 OFFICE O/P EST MOD 30 MIN: CPT | Performed by: PHYSICIAN ASSISTANT

## 2024-02-20 PROCEDURE — G0439 PPPS, SUBSEQ VISIT: HCPCS | Performed by: PHYSICIAN ASSISTANT

## 2024-02-20 NOTE — PROGRESS NOTES
Assessment and Plan:     Problem List Items Addressed This Visit        Cardiovascular and Mediastinum    Essential hypertension - Primary       Genitourinary    Prostate cancer (HCC)    Stage 3 chronic kidney disease (HCC)    Relevant Orders    Comprehensive metabolic panel       Other    Medicare annual wellness visit, subsequent    Hyperlipidemia, mixed    Relevant Orders    Lipid Panel with Direct LDL reflex   Other Visit Diagnoses     Encounter for hepatitis C screening test for low risk patient        Relevant Orders    Hepatitis C antibody    History of basal cell carcinoma          BP remains well controlled. Check labs as above. Continue statin. No medication changes today. Mood stable. Last PSA undetectable. Unremarkable PE today. 6 month follow up, earlier prn     Preventive health issues were discussed with patient, and age appropriate screening tests were ordered as noted in patient's After Visit Summary.  Personalized health advice and appropriate referrals for health education or preventive services given if needed, as noted in patient's After Visit Summary.     History of Present Illness:     Patient presents for a Medicare Wellness Visit    Pt presents for follow up. No acute concerns today. He overall is feeling well.     HTN: well controlled on lisinopril, 122/78, no end organ complaints.   HLD: +statin, due for FLP  OCD: controlled on prozac BID  Hx of prostate cancer, hx of prostatectomy, PSA being followed, remained undetectable last check    In the interim pt had successful mohs surgery for BCC of his neck          Patient Care Team:  Marciano Sultana PA-C as PCP - General (Family Medicine)  Naren Paul MD (Urology)  Benita Quispe MD as Consulting Physician (Nephrology)  Rosalie Nuñez PA-C as Physician Assistant (Physician Assistant)     Review of Systems:     Review of Systems   Constitutional:  Negative for chills, fatigue and fever.   HENT:  Negative for congestion, ear pain,  hearing loss, nosebleeds, postnasal drip, rhinorrhea, sinus pressure, sinus pain, sneezing and sore throat.    Eyes:  Negative for pain, discharge, itching and visual disturbance.   Respiratory:  Negative for cough, chest tightness, shortness of breath and wheezing.    Cardiovascular:  Negative for chest pain, palpitations and leg swelling.   Gastrointestinal:  Negative for abdominal pain, blood in stool, constipation, diarrhea, nausea and vomiting.   Genitourinary:  Negative for frequency and urgency.   Neurological:  Negative for dizziness, light-headedness and numbness.   Psychiatric/Behavioral: Negative.          Problem List:     Patient Active Problem List   Diagnosis   • Medicare annual wellness visit, subsequent   • Essential hypertension   • Hyperlipidemia, mixed   • Prostate cancer (HCC)   • Psoriasis   • Stage 3 chronic kidney disease (HCC)      Past Medical and Surgical History:     Past Medical History:   Diagnosis Date   • Arthritis    • Cancer (HCC)     prostate   • Hyperlipidemia    • Hypertension    • OCD (obsessive compulsive disorder)      Past Surgical History:   Procedure Laterality Date   • FINGER AMPUTATION Left     partial middle and ring   • FL RETROGRADE PYELOGRAM  04/05/2019   • FL RETROGRADE PYELOGRAM  05/09/2019   • KNEE ARTHROSCOPY Right     acl   • KNEE SURGERY Right     menisectomy   • MN CYSTO/URETERO W/LITHOTRIPSY &INDWELL STENT INSRT Left 04/05/2019    Procedure: CYSTOSCOPY URETEROSCOPY WITH  INSERTION STENT URETERAL;  Surgeon: Naren Paul MD;  [facility];  Service: Urology   • MN CYSTO/URETERO W/LITHOTRIPSY &INDWELL STENT INSRT Left 05/09/2019    Procedure: CYSTOSCOPY URETEROSCOPY WITH LITHOTRIPSY HOLMIUM LASER, RETROGRADE PYELOGRAM AND INSERTION STENT URETERAL;  Surgeon: Naren Paul MD;  [facility];  Service: Urology   • MN LAPS SURG [id number] RPBIC RAD W/NRV SPARING ROBOT N/A 03/18/2019    Procedure: ROBOTIC RADICAL PROSTATECTOMY; BILATERAL PELVIC  LYMPH NODE DISSECTION;  Surgeon: Naren Paul MD;  [facility];  Service: Urology      Family History:     Family History   Problem Relation Age of Onset   • No Known Problems Mother    • No Known Problems Father    • Cancer Sister    • No Known Problems Brother       Social History:     Social History     Socioeconomic History   • Marital status: /Civil Union     Spouse name: None   • Number of children: None   • Years of education: None   • Highest education level: None   Occupational History   • None   Tobacco Use   • Smoking status: Former   • Smokeless tobacco: Never   • Tobacco comments:     30+ years ago   Vaping Use   • Vaping status: Never Used   Substance and Sexual Activity   • Alcohol use: No   • Drug use: No   • Sexual activity: None   Other Topics Concern   • None   Social History Narrative   • None     Social Determinants of Health     Financial Resource Strain: Low Risk  (2/16/2024)    Overall Financial Resource Strain (CARDIA)    • Difficulty of Paying Living Expenses: Not hard at all   Food Insecurity: Not on file   Transportation Needs: No Transportation Needs (2/16/2024)    PRAPARE - Transportation    • Lack of Transportation (Medical): No    • Lack of Transportation (Non-Medical): No   Physical Activity: Not on file   Stress: Not on file   Social Connections: Not on file   Intimate Partner Violence: Not on file   Housing Stability: Not on file      Medications and Allergies:     Current Outpatient Medications   Medication Sig Dispense Refill   • co-enzyme Q-10 30 MG capsule Take 200 mg by mouth daily     • FLUoxetine (PROzac) 20 mg capsule TAKE ONE CAPSULE BY MOUTH TWICE A  capsule 0   • lisinopril (ZESTRIL) 10 mg tablet Take 1 tablet (10 mg total) by mouth daily 30 tablet 0   • rosuvastatin (CRESTOR) 10 MG tablet Take 1 tablet (10 mg total) by mouth daily 30 tablet 0   • sildenafil (REVATIO) 20 mg tablet Take 2-3 tablets by mouth nightly 60 tablet 6     No current  facility-administered medications for this visit.     No Known Allergies   Immunizations:     Immunization History   Administered Date(s) Administered   • COVID-19 MODERNA VACC 0.5 ML IM 02/12/2021, 03/12/2021, 10/27/2021   • Tdap 07/31/2017      Health Maintenance:         Topic Date Due   • Hepatitis C Screening  Never done   • Colorectal Cancer Screening  09/12/2025         Topic Date Due   • Pneumococcal Vaccine: 65+ Years (1 of 1 - PCV) Never done   • Influenza Vaccine (1) Never done   • COVID-19 Vaccine (4 - 2023-24 season) 09/01/2023      Medicare Screening Tests and Risk Assessments:     Zach is here for his Initial Wellness visit. Last Medicare Wellness visit information reviewed, patient interviewed, no change since last AWV.     Health Risk Assessment:   Patient rates overall health as very good. Patient feels that their physical health rating is same. Patient is very satisfied with their life. Eyesight was rated as slightly worse. Hearing was rated as same. Patient feels that their emotional and mental health rating is same. Patients states they are never, rarely angry. Patient states they are never, rarely unusually tired/fatigued. Pain experienced in the last 7 days has been some. Patient's pain rating has been 2/10. Patient states that he has experienced no weight loss or gain in last 6 months.     Depression Screening:   PHQ-2 Score: 0      Fall Risk Screening:   In the past year, patient has experienced: no history of falling in past year      Home Safety:  Patient does not have trouble with stairs inside or outside of their home. Patient has working smoke alarms and has working carbon monoxide detector. Home safety hazards include: none.     Nutrition:   Current diet is Low Saturated Fat and Low Carb.     Medications:   Patient is not currently taking any over-the-counter supplements. Patient is able to manage medications.     Activities of Daily Living (ADLs)/Instrumental Activities of Daily  Living (IADLs):   Walk and transfer into and out of bed and chair?: Yes  Dress and groom yourself?: Yes    Bathe or shower yourself?: Yes    Feed yourself? Yes  Do your laundry/housekeeping?: Yes  Manage your money, pay your bills and track your expenses?: Yes  Make your own meals?: Yes    Do your own shopping?: Yes    Previous Hospitalizations:   Any hospitalizations or ED visits within the last 12 months?: No      Advance Care Planning:   Living will: No    Durable POA for healthcare: No    Advanced directive: No    Advanced directive counseling given: Yes    ACP document given: Yes      PREVENTIVE SCREENINGS      Cardiovascular Screening:    General: Screening Not Indicated and History Lipid Disorder      Diabetes Screening:     General: Screening Current      Colorectal Cancer Screening:     General: Screening Current      Prostate Cancer Screening:    General: History Prostate Cancer and Screening Current      Osteoporosis Screening:    General: Screening Not Indicated      Abdominal Aortic Aneurysm (AAA) Screening:    Risk factors include: age between 65-76 yo and tobacco use        General: Screening Not Indicated      Lung Cancer Screening:     General: Screening Not Indicated      Hepatitis C Screening:    General: Risks and Benefits Discussed    Hep C Screening Accepted: Yes      Screening, Brief Intervention, and Referral to Treatment (SBIRT)    Screening  Typical number of drinks in a day: 0  Typical number of drinks in a week: 0  Interpretation: Low risk drinking behavior.    AUDIT-C Screenin) How often did you have a drink containing alcohol in the past year? never  2) How many drinks did you have on a typical day when you were drinking in the past year? 0  3) How often did you have 6 or more drinks on one occasion in the past year? never    AUDIT-C Score: 0  Interpretation: Score 0-3 (male): Negative screen for alcohol misuse    Single Item Drug Screening:  How often have you used an illegal drug  "(including marijuana) or a prescription medication for non-medical reasons in the past year? never    Single Item Drug Screen Score: 0  Interpretation: Negative screen for possible drug use disorder    No results found.     Physical Exam:     /78 (BP Location: Left arm, Patient Position: Sitting, Cuff Size: Large)   Pulse 68   Temp 97.8 °F (36.6 °C)   Ht 5' 9\" (1.753 m)   Wt 83.5 kg (184 lb)   SpO2 100%   BMI 27.17 kg/m²     Physical Exam  Vitals and nursing note reviewed.   Constitutional:       General: He is not in acute distress.     Appearance: He is well-developed.   HENT:      Head: Normocephalic and atraumatic.      Right Ear: Tympanic membrane normal.      Left Ear: Tympanic membrane normal.      Nose: Nose normal.      Mouth/Throat:      Mouth: Mucous membranes are moist.      Pharynx: Oropharynx is clear.   Eyes:      Conjunctiva/sclera: Conjunctivae normal.   Cardiovascular:      Rate and Rhythm: Normal rate and regular rhythm.      Heart sounds: No murmur heard.  Pulmonary:      Effort: Pulmonary effort is normal. No respiratory distress.      Breath sounds: Normal breath sounds. No wheezing, rhonchi or rales.   Abdominal:      Palpations: Abdomen is soft.      Tenderness: There is no abdominal tenderness.   Musculoskeletal:         General: No swelling.      Cervical back: Normal range of motion and neck supple.      Right lower leg: No edema.      Left lower leg: No edema.   Skin:     General: Skin is warm and dry.      Capillary Refill: Capillary refill takes less than 2 seconds.   Neurological:      Mental Status: He is alert.   Psychiatric:         Mood and Affect: Mood normal.          Marciano Sultana PA-C  "

## 2024-02-20 NOTE — PATIENT INSTRUCTIONS
Medicare Preventive Visit Patient Instructions  Thank you for completing your Welcome to Medicare Visit or Medicare Annual Wellness Visit today. Your next wellness visit will be due in one year (2/20/2025).  The screening/preventive services that you may require over the next 5-10 years are detailed below. Some tests may not apply to you based off risk factors and/or age. Screening tests ordered at today's visit but not completed yet may show as past due. Also, please note that scanned in results may not display below.  Preventive Screenings:  Service Recommendations Previous Testing/Comments   Colorectal Cancer Screening  Colonoscopy    Fecal Occult Blood Test (FOBT)/Fecal Immunochemical Test (FIT)  Fecal DNA/Cologuard Test  Flexible Sigmoidoscopy Age: 45-75 years old   Colonoscopy: every 10 years (May be performed more frequently if at higher risk)  OR  FOBT/FIT: every 1 year  OR  Cologuard: every 3 years  OR  Sigmoidoscopy: every 5 years  Screening may be recommended earlier than age 45 if at higher risk for colorectal cancer. Also, an individualized decision between you and your healthcare provider will decide whether screening between the ages of 76-85 would be appropriate. Colonoscopy: 09/13/2022  FOBT/FIT: Not on file  Cologuard: 06/10/2022  Sigmoidoscopy: 02/26/2018    Screening Current     Prostate Cancer Screening Individualized decision between patient and health care provider in men between ages of 55-69   Medicare will cover every 12 months beginning on the day after your 50th birthday PSA: <0.01 ng/mL     History Prostate Cancer     Hepatitis C Screening Once for adults born between 1945 and 1965  More frequently in patients at high risk for Hepatitis C Hep C Antibody: Not on file        Diabetes Screening 1-2 times per year if you're at risk for diabetes or have pre-diabetes Fasting glucose: 90 mg/dL (8/11/2023)  A1C: No results in last 5 years (No results in last 5 years)  Screening Current    Cholesterol Screening Once every 5 years if you don't have a lipid disorder. May order more often based on risk factors. Lipid panel: 08/11/2023  Screening Not Indicated  History Lipid Disorder      Other Preventive Screenings Covered by Medicare:  Abdominal Aortic Aneurysm (AAA) Screening: covered once if your at risk. You're considered to be at risk if you have a family history of AAA or a male between the age of 65-75 who smoking at least 100 cigarettes in your lifetime.  Lung Cancer Screening: covers low dose CT scan once per year if you meet all of the following conditions: (1) Age 55-77; (2) No signs or symptoms of lung cancer; (3) Current smoker or have quit smoking within the last 15 years; (4) You have a tobacco smoking history of at least 20 pack years (packs per day x number of years you smoked); (5) You get a written order from a healthcare provider.  Glaucoma Screening: covered annually if you're considered high risk: (1) You have diabetes OR (2) Family history of glaucoma OR (3)  aged 50 and older OR (4)  American aged 65 and older  Osteoporosis Screening: covered every 2 years if you meet one of the following conditions: (1) Have a vertebral abnormality; (2) On glucocorticoid therapy for more than 3 months; (3) Have primary hyperparathyroidism; (4) On osteoporosis medications and need to assess response to drug therapy.  HIV Screening: covered annually if you're between the age of 15-65. Also covered annually if you are younger than 15 and older than 65 with risk factors for HIV infection. For pregnant patients, it is covered up to 3 times per pregnancy.    Immunizations:  Immunization Recommendations   Influenza Vaccine Annual influenza vaccination during flu season is recommended for all persons aged >= 6 months who do not have contraindications   Pneumococcal Vaccine   * Pneumococcal conjugate vaccine = PCV13 (Prevnar 13), PCV15 (Vaxneuvance), PCV20 (Prevnar 20)  *  Pneumococcal polysaccharide vaccine = PPSV23 (Pneumovax) Adults 19-63 yo with certain risk factors or if 65+ yo  If never received any pneumonia vaccine: recommend Prevnar 20 (PCV20)  Give PCV20 if previously received 1 dose of PCV13 or PPSV23   Hepatitis B Vaccine 3 dose series if at intermediate or high risk (ex: diabetes, end stage renal disease, liver disease)   Respiratory syncytial virus (RSV) Vaccine - COVERED BY MEDICARE PART D  * RSVPreF3 (Arexvy) CDC recommends that adults 60 years of age and older may receive a single dose of RSV vaccine using shared clinical decision-making (SCDM)   Tetanus (Td) Vaccine - COST NOT COVERED BY MEDICARE PART B Following completion of primary series, a booster dose should be given every 10 years to maintain immunity against tetanus. Td may also be given as tetanus wound prophylaxis.   Tdap Vaccine - COST NOT COVERED BY MEDICARE PART B Recommended at least once for all adults. For pregnant patients, recommended with each pregnancy.   Shingles Vaccine (Shingrix) - COST NOT COVERED BY MEDICARE PART B  2 shot series recommended in those 19 years and older who have or will have weakened immune systems or those 50 years and older     Health Maintenance Due:      Topic Date Due   • Hepatitis C Screening  Never done   • Colorectal Cancer Screening  09/12/2025     Immunizations Due:      Topic Date Due   • Pneumococcal Vaccine: 65+ Years (1 of 1 - PCV) Never done   • Influenza Vaccine (1) Never done   • COVID-19 Vaccine (4 - 2023-24 season) 09/01/2023     Advance Directives   What are advance directives?  Advance directives are legal documents that state your wishes and plans for medical care. These plans are made ahead of time in case you lose your ability to make decisions for yourself. Advance directives can apply to any medical decision, such as the treatments you want, and if you want to donate organs.   What are the types of advance directives?  There are many types of advance  directives, and each state has rules about how to use them. You may choose a combination of any of the following:  Living will:  This is a written record of the treatment you want. You can also choose which treatments you do not want, which to limit, and which to stop at a certain time. This includes surgery, medicine, IV fluid, and tube feedings.   Durable power of  for healthcare (DPAHC):  This is a written record that states who you want to make healthcare choices for you when you are unable to make them for yourself. This person, called a proxy, is usually a family member or a friend. You may choose more than 1 proxy.  Do not resuscitate (DNR) order:  A DNR order is used in case your heart stops beating or you stop breathing. It is a request not to have certain forms of treatment, such as CPR. A DNR order may be included in other types of advance directives.  Medical directive:  This covers the care that you want if you are in a coma, near death, or unable to make decisions for yourself. You can list the treatments you want for each condition. Treatment may include pain medicine, surgery, blood transfusions, dialysis, IV or tube feedings, and a ventilator (breathing machine).  Values history:  This document has questions about your views, beliefs, and how you feel and think about life. This information can help others choose the care that you would choose.  Why are advance directives important?  An advance directive helps you control your care. Although spoken wishes may be used, it is better to have your wishes written down. Spoken wishes can be misunderstood, or not followed. Treatments may be given even if you do not want them. An advance directive may make it easier for your family to make difficult choices about your care.   Weight Management   Why it is important to manage your weight:  Being overweight increases your risk of health conditions such as heart disease, high blood pressure, type 2  diabetes, and certain types of cancer. It can also increase your risk for osteoarthritis, sleep apnea, and other respiratory problems. Aim for a slow, steady weight loss. Even a small amount of weight loss can lower your risk of health problems.  How to lose weight safely:  A safe and healthy way to lose weight is to eat fewer calories and get regular exercise. You can lose up about 1 pound a week by decreasing the number of calories you eat by 500 calories each day.   Healthy meal plan for weight management:  A healthy meal plan includes a variety of foods, contains fewer calories, and helps you stay healthy. A healthy meal plan includes the following:  Eat whole-grain foods more often.  A healthy meal plan should contain fiber. Fiber is the part of grains, fruits, and vegetables that is not broken down by your body. Whole-grain foods are healthy and provide extra fiber in your diet. Some examples of whole-grain foods are whole-wheat breads and pastas, oatmeal, brown rice, and bulgur.  Eat a variety of vegetables every day.  Include dark, leafy greens such as spinach, kale, carlene greens, and mustard greens. Eat yellow and orange vegetables such as carrots, sweet potatoes, and winter squash.   Eat a variety of fruits every day.  Choose fresh or canned fruit (canned in its own juice or light syrup) instead of juice. Fruit juice has very little or no fiber.  Eat low-fat dairy foods.  Drink fat-free (skim) milk or 1% milk. Eat fat-free yogurt and low-fat cottage cheese. Try low-fat cheeses such as mozzarella and other reduced-fat cheeses.  Choose meat and other protein foods that are low in fat.  Choose beans or other legumes such as split peas or lentils. Choose fish, skinless poultry (chicken or turkey), or lean cuts of red meat (beef or pork). Before you cook meat or poultry, cut off any visible fat.   Use less fat and oil.  Try baking foods instead of frying them. Add less fat, such as margarine, sour cream,  regular salad dressing and mayonnaise to foods. Eat fewer high-fat foods. Some examples of high-fat foods include french fries, doughnuts, ice cream, and cakes.  Eat fewer sweets.  Limit foods and drinks that are high in sugar. This includes candy, cookies, regular soda, and sweetened drinks.  Exercise:  Exercise at least 30 minutes per day on most days of the week. Some examples of exercise include walking, biking, dancing, and swimming. You can also fit in more physical activity by taking the stairs instead of the elevator or parking farther away from stores. Ask your healthcare provider about the best exercise plan for you.      © Copyright [employer] 2018 Information is for End User's use only and may not be sold, redistributed or otherwise used for commercial purposes. All illustrations and images included in CareNotes® are the copyrighted property of [employer] or [employer]

## 2024-02-21 PROBLEM — Z00.00 MEDICARE ANNUAL WELLNESS VISIT, SUBSEQUENT: Status: RESOLVED | Noted: 2018-10-04 | Resolved: 2024-02-21

## 2024-02-23 ENCOUNTER — APPOINTMENT (OUTPATIENT)
Dept: LAB | Facility: CLINIC | Age: 74
End: 2024-02-23
Payer: COMMERCIAL

## 2024-02-23 DIAGNOSIS — Z11.59 ENCOUNTER FOR HEPATITIS C SCREENING TEST FOR LOW RISK PATIENT: ICD-10-CM

## 2024-02-23 DIAGNOSIS — N18.31 STAGE 3A CHRONIC KIDNEY DISEASE (HCC): ICD-10-CM

## 2024-02-23 DIAGNOSIS — E78.2 HYPERLIPIDEMIA, MIXED: ICD-10-CM

## 2024-02-23 LAB
ALBUMIN SERPL BCP-MCNC: 4.5 G/DL (ref 3.5–5)
ALP SERPL-CCNC: 62 U/L (ref 34–104)
ALT SERPL W P-5'-P-CCNC: 20 U/L (ref 7–52)
ANION GAP SERPL CALCULATED.3IONS-SCNC: 8 MMOL/L
AST SERPL W P-5'-P-CCNC: 17 U/L (ref 13–39)
BILIRUB SERPL-MCNC: 0.77 MG/DL (ref 0.2–1)
BUN SERPL-MCNC: 20 MG/DL (ref 5–25)
CALCIUM SERPL-MCNC: 9.6 MG/DL (ref 8.4–10.2)
CHLORIDE SERPL-SCNC: 103 MMOL/L (ref 96–108)
CHOLEST SERPL-MCNC: 143 MG/DL
CO2 SERPL-SCNC: 30 MMOL/L (ref 21–32)
CREAT SERPL-MCNC: 1.27 MG/DL (ref 0.6–1.3)
GFR SERPL CREATININE-BSD FRML MDRD: 56 ML/MIN/1.73SQ M
GLUCOSE P FAST SERPL-MCNC: 93 MG/DL (ref 65–99)
HCV AB SER QL: NORMAL
HDLC SERPL-MCNC: 38 MG/DL
LDLC SERPL CALC-MCNC: 86 MG/DL (ref 0–100)
POTASSIUM SERPL-SCNC: 4.9 MMOL/L (ref 3.5–5.3)
PROT SERPL-MCNC: 7.1 G/DL (ref 6.4–8.4)
SODIUM SERPL-SCNC: 141 MMOL/L (ref 135–147)
TRIGL SERPL-MCNC: 93 MG/DL

## 2024-02-23 PROCEDURE — 80053 COMPREHEN METABOLIC PANEL: CPT

## 2024-02-23 PROCEDURE — 36415 COLL VENOUS BLD VENIPUNCTURE: CPT

## 2024-02-23 PROCEDURE — 80061 LIPID PANEL: CPT

## 2024-02-23 PROCEDURE — 86803 HEPATITIS C AB TEST: CPT

## 2024-03-02 ENCOUNTER — HOSPITAL ENCOUNTER (INPATIENT)
Facility: HOSPITAL | Age: 74
LOS: 2 days | Discharge: HOME | DRG: 390 | End: 2024-03-04
Attending: EMERGENCY MEDICINE | Admitting: STUDENT IN AN ORGANIZED HEALTH CARE EDUCATION/TRAINING PROGRAM
Payer: COMMERCIAL

## 2024-03-02 ENCOUNTER — APPOINTMENT (EMERGENCY)
Dept: CT IMAGING | Facility: HOSPITAL | Age: 74
DRG: 390 | End: 2024-03-02
Payer: COMMERCIAL

## 2024-03-02 DIAGNOSIS — R10.9 ABDOMINAL PAIN: Primary | ICD-10-CM

## 2024-03-02 DIAGNOSIS — E78.2 HYPERLIPIDEMIA, MIXED: ICD-10-CM

## 2024-03-02 DIAGNOSIS — I10 ESSENTIAL HYPERTENSION: ICD-10-CM

## 2024-03-02 DIAGNOSIS — F42.8 OTHER OBSESSIVE-COMPULSIVE DISORDERS: ICD-10-CM

## 2024-03-02 DIAGNOSIS — K56.609 SBO (SMALL BOWEL OBSTRUCTION) (HCC): ICD-10-CM

## 2024-03-02 PROBLEM — F42.9 OCD (OBSESSIVE COMPULSIVE DISORDER): Status: ACTIVE | Noted: 2024-03-02

## 2024-03-02 LAB
2HR DELTA HS TROPONIN: >0 NG/L
ALBUMIN SERPL BCP-MCNC: 4 G/DL (ref 3.5–5)
ALP SERPL-CCNC: 53 U/L (ref 34–104)
ALT SERPL W P-5'-P-CCNC: 21 U/L (ref 7–52)
ANION GAP SERPL CALCULATED.3IONS-SCNC: 11 MMOL/L
APTT PPP: 29 SECONDS (ref 23–37)
AST SERPL W P-5'-P-CCNC: 18 U/L (ref 13–39)
BASOPHILS # BLD MANUAL: 0 THOUSAND/UL (ref 0–0.1)
BASOPHILS NFR MAR MANUAL: 0 % (ref 0–1)
BILIRUB SERPL-MCNC: 0.59 MG/DL (ref 0.2–1)
BILIRUB UR QL STRIP: NEGATIVE
BUN SERPL-MCNC: 23 MG/DL (ref 5–25)
CALCIUM SERPL-MCNC: 8.8 MG/DL (ref 8.4–10.2)
CARDIAC TROPONIN I PNL SERPL HS: 2 NG/L
CARDIAC TROPONIN I PNL SERPL HS: <2 NG/L
CHLORIDE SERPL-SCNC: 103 MMOL/L (ref 96–108)
CLARITY UR: CLEAR
CO2 SERPL-SCNC: 23 MMOL/L (ref 21–32)
COLOR UR: COLORLESS
CREAT SERPL-MCNC: 1.21 MG/DL (ref 0.6–1.3)
EOSINOPHIL # BLD MANUAL: 0 THOUSAND/UL (ref 0–0.4)
EOSINOPHIL NFR BLD MANUAL: 0 % (ref 0–6)
ERYTHROCYTE [DISTWIDTH] IN BLOOD BY AUTOMATED COUNT: 12 % (ref 11.6–15.1)
FLUAV RNA RESP QL NAA+PROBE: NEGATIVE
FLUBV RNA RESP QL NAA+PROBE: NEGATIVE
GFR SERPL CREATININE-BSD FRML MDRD: 59 ML/MIN/1.73SQ M
GLUCOSE SERPL-MCNC: 173 MG/DL (ref 65–140)
GLUCOSE UR STRIP-MCNC: NEGATIVE MG/DL
HCT VFR BLD AUTO: 49.3 % (ref 36.5–49.3)
HGB BLD-MCNC: 16.8 G/DL (ref 12–17)
HGB UR QL STRIP.AUTO: NEGATIVE
INR PPP: 1.18 (ref 0.84–1.19)
KETONES UR STRIP-MCNC: ABNORMAL MG/DL
LACTATE SERPL-SCNC: 1.4 MMOL/L (ref 0.5–2)
LACTATE SERPL-SCNC: 3.5 MMOL/L (ref 0.5–2)
LEUKOCYTE ESTERASE UR QL STRIP: NEGATIVE
LYMPHOCYTES # BLD AUTO: 1.3 THOUSAND/UL (ref 0.6–4.47)
LYMPHOCYTES # BLD AUTO: 8 % (ref 14–44)
MCH RBC QN AUTO: 29.8 PG (ref 26.8–34.3)
MCHC RBC AUTO-ENTMCNC: 34.1 G/DL (ref 31.4–37.4)
MCV RBC AUTO: 87 FL (ref 82–98)
MONOCYTES # BLD AUTO: 0.81 THOUSAND/UL (ref 0–1.22)
MONOCYTES NFR BLD: 5 % (ref 4–12)
NEUTROPHILS # BLD MANUAL: 14.11 THOUSAND/UL (ref 1.85–7.62)
NEUTS SEG NFR BLD AUTO: 87 % (ref 43–75)
NITRITE UR QL STRIP: NEGATIVE
PH UR STRIP.AUTO: 5.5 [PH]
PLATELET # BLD AUTO: 344 THOUSANDS/UL (ref 149–390)
PLATELET BLD QL SMEAR: ADEQUATE
PMV BLD AUTO: 9.1 FL (ref 8.9–12.7)
POTASSIUM SERPL-SCNC: 4.3 MMOL/L (ref 3.5–5.3)
PROCALCITONIN SERPL-MCNC: <0.05 NG/ML
PROT SERPL-MCNC: 6.8 G/DL (ref 6.4–8.4)
PROT UR STRIP-MCNC: NEGATIVE MG/DL
PROTHROMBIN TIME: 15.6 SECONDS (ref 11.6–14.5)
RBC # BLD AUTO: 5.64 MILLION/UL (ref 3.88–5.62)
RBC MORPH BLD: NORMAL
RSV RNA RESP QL NAA+PROBE: NEGATIVE
SARS-COV-2 RNA RESP QL NAA+PROBE: NEGATIVE
SODIUM SERPL-SCNC: 137 MMOL/L (ref 135–147)
SP GR UR STRIP.AUTO: >=1.05 (ref 1–1.03)
UROBILINOGEN UR STRIP-ACNC: <2 MG/DL
WBC # BLD AUTO: 16.22 THOUSAND/UL (ref 4.31–10.16)

## 2024-03-02 PROCEDURE — 85007 BL SMEAR W/DIFF WBC COUNT: CPT | Performed by: EMERGENCY MEDICINE

## 2024-03-02 PROCEDURE — 99222 1ST HOSP IP/OBS MODERATE 55: CPT | Performed by: STUDENT IN AN ORGANIZED HEALTH CARE EDUCATION/TRAINING PROGRAM

## 2024-03-02 PROCEDURE — 71260 CT THORAX DX C+: CPT

## 2024-03-02 PROCEDURE — 99284 EMERGENCY DEPT VISIT MOD MDM: CPT

## 2024-03-02 PROCEDURE — 96365 THER/PROPH/DIAG IV INF INIT: CPT

## 2024-03-02 PROCEDURE — 84484 ASSAY OF TROPONIN QUANT: CPT | Performed by: EMERGENCY MEDICINE

## 2024-03-02 PROCEDURE — 85027 COMPLETE CBC AUTOMATED: CPT | Performed by: EMERGENCY MEDICINE

## 2024-03-02 PROCEDURE — 85610 PROTHROMBIN TIME: CPT | Performed by: EMERGENCY MEDICINE

## 2024-03-02 PROCEDURE — 81003 URINALYSIS AUTO W/O SCOPE: CPT | Performed by: EMERGENCY MEDICINE

## 2024-03-02 PROCEDURE — 83605 ASSAY OF LACTIC ACID: CPT | Performed by: EMERGENCY MEDICINE

## 2024-03-02 PROCEDURE — 87040 BLOOD CULTURE FOR BACTERIA: CPT | Performed by: EMERGENCY MEDICINE

## 2024-03-02 PROCEDURE — 84145 PROCALCITONIN (PCT): CPT | Performed by: EMERGENCY MEDICINE

## 2024-03-02 PROCEDURE — 74177 CT ABD & PELVIS W/CONTRAST: CPT

## 2024-03-02 PROCEDURE — 96375 TX/PRO/DX INJ NEW DRUG ADDON: CPT

## 2024-03-02 PROCEDURE — 0241U HB NFCT DS VIR RESP RNA 4 TRGT: CPT | Performed by: EMERGENCY MEDICINE

## 2024-03-02 PROCEDURE — 96361 HYDRATE IV INFUSION ADD-ON: CPT

## 2024-03-02 PROCEDURE — 80053 COMPREHEN METABOLIC PANEL: CPT | Performed by: EMERGENCY MEDICINE

## 2024-03-02 PROCEDURE — 36415 COLL VENOUS BLD VENIPUNCTURE: CPT | Performed by: EMERGENCY MEDICINE

## 2024-03-02 PROCEDURE — 85730 THROMBOPLASTIN TIME PARTIAL: CPT | Performed by: EMERGENCY MEDICINE

## 2024-03-02 RX ORDER — ACETAMINOPHEN 10 MG/ML
1000 INJECTION, SOLUTION INTRAVENOUS EVERY 6 HOURS PRN
Status: DISCONTINUED | OUTPATIENT
Start: 2024-03-02 | End: 2024-03-03

## 2024-03-02 RX ORDER — ACETAMINOPHEN 10 MG/ML
1000 INJECTION, SOLUTION INTRAVENOUS ONCE
Status: COMPLETED | OUTPATIENT
Start: 2024-03-02 | End: 2024-03-02

## 2024-03-02 RX ORDER — ONDANSETRON 2 MG/ML
4 INJECTION INTRAMUSCULAR; INTRAVENOUS EVERY 8 HOURS PRN
Status: DISCONTINUED | OUTPATIENT
Start: 2024-03-02 | End: 2024-03-04 | Stop reason: HOSPADM

## 2024-03-02 RX ORDER — MORPHINE SULFATE 4 MG/ML
4 INJECTION, SOLUTION INTRAMUSCULAR; INTRAVENOUS ONCE
Status: DISCONTINUED | OUTPATIENT
Start: 2024-03-02 | End: 2024-03-02

## 2024-03-02 RX ORDER — ONDANSETRON 2 MG/ML
4 INJECTION INTRAMUSCULAR; INTRAVENOUS ONCE
Status: COMPLETED | OUTPATIENT
Start: 2024-03-02 | End: 2024-03-02

## 2024-03-02 RX ORDER — ENOXAPARIN SODIUM 100 MG/ML
40 INJECTION SUBCUTANEOUS DAILY
Status: DISCONTINUED | OUTPATIENT
Start: 2024-03-03 | End: 2024-03-04 | Stop reason: HOSPADM

## 2024-03-02 RX ORDER — SODIUM CHLORIDE, SODIUM LACTATE, POTASSIUM CHLORIDE, CALCIUM CHLORIDE 600; 310; 30; 20 MG/100ML; MG/100ML; MG/100ML; MG/100ML
100 INJECTION, SOLUTION INTRAVENOUS CONTINUOUS
Status: DISCONTINUED | OUTPATIENT
Start: 2024-03-02 | End: 2024-03-03

## 2024-03-02 RX ADMIN — SODIUM CHLORIDE 1000 ML: 0.9 INJECTION, SOLUTION INTRAVENOUS at 16:24

## 2024-03-02 RX ADMIN — ONDANSETRON 4 MG: 2 INJECTION INTRAMUSCULAR; INTRAVENOUS at 16:18

## 2024-03-02 RX ADMIN — SODIUM CHLORIDE, SODIUM LACTATE, POTASSIUM CHLORIDE, AND CALCIUM CHLORIDE 100 ML/HR: .6; .31; .03; .02 INJECTION, SOLUTION INTRAVENOUS at 21:30

## 2024-03-02 RX ADMIN — IOHEXOL 100 ML: 350 INJECTION, SOLUTION INTRAVENOUS at 17:33

## 2024-03-02 RX ADMIN — SODIUM CHLORIDE 1000 ML: 0.9 INJECTION, SOLUTION INTRAVENOUS at 17:32

## 2024-03-02 RX ADMIN — ACETAMINOPHEN 1000 MG: 10 INJECTION INTRAVENOUS at 16:18

## 2024-03-02 NOTE — ED PROVIDER NOTES
History  Chief Complaint   Patient presents with    Abdominal Pain     Abd pain across lower abd that began this AM, some nausea and chills      HPI  74 yo M presents with abdominal pain that started this morning, pain is bilateral lower quadrants and suprapubic area. Pain started this morning after attempting to have bowel movement. +Constipation. He reports chills and nausea. Denies urinary symptoms.  Prior to Admission Medications   Prescriptions Last Dose Informant Patient Reported? Taking?   FLUoxetine (PROzac) 20 mg capsule   No No   Sig: TAKE ONE CAPSULE BY MOUTH TWICE A DAY   co-enzyme Q-10 30 MG capsule  Self Yes No   Sig: Take 200 mg by mouth daily   lisinopril (ZESTRIL) 10 mg tablet   No No   Sig: Take 1 tablet (10 mg total) by mouth daily   rosuvastatin (CRESTOR) 10 MG tablet   No No   Sig: Take 1 tablet (10 mg total) by mouth daily   sildenafil (REVATIO) 20 mg tablet  Self No No   Sig: Take 2-3 tablets by mouth nightly      Facility-Administered Medications: None       Past Medical History:   Diagnosis Date    Arthritis     Cancer (HCC)     prostate    Hyperlipidemia     Hypertension     OCD (obsessive compulsive disorder)        Past Surgical History:   Procedure Laterality Date    FINGER AMPUTATION Left     partial middle and ring    FL RETROGRADE PYELOGRAM  04/05/2019    FL RETROGRADE PYELOGRAM  05/09/2019    KNEE ARTHROSCOPY Right     acl    KNEE SURGERY Right     menisectomy    SD CYSTO/URETERO W/LITHOTRIPSY &INDWELL STENT INSRT Left 04/05/2019    Procedure: CYSTOSCOPY URETEROSCOPY WITH  INSERTION STENT URETERAL;  Surgeon: Naren Paul MD;  Location: [facility];  Service: Urology    SD CYSTO/URETERO W/LITHOTRIPSY &INDWELL STENT INSRT Left 05/09/2019    Procedure: CYSTOSCOPY URETEROSCOPY WITH LITHOTRIPSY HOLMIUM LASER, RETROGRADE PYELOGRAM AND INSERTION STENT URETERAL;  Surgeon: Naren Paul MD;  Location: [facility];  Service: Urology    SD LAPS SURG [id number] RPBIC RAD W/NRV SPARING ROBOT  N/A 03/18/2019    Procedure: ROBOTIC RADICAL PROSTATECTOMY; BILATERAL PELVIC LYMPH NODE DISSECTION;  Surgeon: Naren Paul MD;  [facility];  Service: Urology       Family History   Problem Relation Age of Onset    No Known Problems Mother     No Known Problems Father     Cancer Sister     No Known Problems Brother      I have reviewed and agree with the history as documented.    E-Cigarette/Vaping    E-Cigarette Use Never User      E-Cigarette/Vaping Substances     Social History     Tobacco Use    Smoking status: Former    Smokeless tobacco: Never    Tobacco comments:     30+ years ago   Vaping Use    Vaping status: Never Used   Substance Use Topics    Alcohol use: No    Drug use: No       Review of Systems   Constitutional:  Positive for chills. Negative for fever.   HENT:  Negative for dental problem and ear pain.    Eyes:  Negative for pain and redness.   Respiratory:  Negative for cough and shortness of breath.    Cardiovascular:  Negative for chest pain and palpitations.   Gastrointestinal:  Positive for abdominal pain, constipation and nausea.   Endocrine: Negative for polydipsia and polyphagia.   Genitourinary:  Negative for dysuria and frequency.   Musculoskeletal:  Negative for arthralgias and joint swelling.   Skin:  Negative for color change and rash.   Neurological:  Negative for dizziness and headaches.   Psychiatric/Behavioral:  Negative for behavioral problems and confusion.    All other systems reviewed and are negative.      Physical Exam  Physical Exam  Vitals and nursing note reviewed.   Constitutional:       General: He is not in acute distress.     Appearance: He is well-developed. He is not diaphoretic.   HENT:      Head: Atraumatic.      Right Ear: External ear normal.      Left Ear: External ear normal.      Nose: Nose normal.   Eyes:      Conjunctiva/sclera: Conjunctivae normal.      Pupils: Pupils are equal, round, and reactive to light.   Neck:      Vascular: No JVD.    Cardiovascular:      Rate and Rhythm: Normal rate and regular rhythm.      Heart sounds: Normal heart sounds. No murmur heard.  Pulmonary:      Effort: Pulmonary effort is normal. No respiratory distress.      Breath sounds: Normal breath sounds. No wheezing.   Abdominal:      General: Bowel sounds are normal. There is no distension.      Palpations: Abdomen is soft.      Tenderness: There is abdominal tenderness in the right lower quadrant, suprapubic area and left lower quadrant.   Musculoskeletal:         General: Normal range of motion.      Cervical back: Normal range of motion and neck supple.   Skin:     General: Skin is warm and dry.      Capillary Refill: Capillary refill takes less than 2 seconds.   Neurological:      Mental Status: He is alert and oriented to person, place, and time.      Cranial Nerves: No cranial nerve deficit.   Psychiatric:         Behavior: Behavior normal.         Vital Signs  ED Triage Vitals   Temperature Pulse Respirations Blood Pressure SpO2   03/02/24 1554 03/02/24 1553 03/02/24 1553 03/02/24 1553 03/02/24 1553   98.4 °F (36.9 °C) 63 16 (!) 184/87 99 %      Temp Source Heart Rate Source Patient Position - Orthostatic VS BP Location FiO2 (%)   03/02/24 1554 03/02/24 1553 03/02/24 1553 03/02/24 1553 --   Oral Monitor Sitting Left arm       Pain Score       --                  Vitals:    03/02/24 1553 03/02/24 1700   BP: (!) 184/87 109/59   Pulse: 63 68   Patient Position - Orthostatic VS: Sitting Sitting         Visual Acuity      ED Medications  Medications   morphine injection 4 mg (4 mg Intravenous Not Given 3/2/24 1709)   sodium chloride 0.9 % bolus 1,000 mL (0 mL Intravenous Stopped 3/2/24 1732)   ondansetron (ZOFRAN) injection 4 mg (4 mg Intravenous Given 3/2/24 1618)   acetaminophen (Ofirmev) injection 1,000 mg (0 mg Intravenous Stopped 3/2/24 1653)   sodium chloride 0.9 % bolus 1,000 mL (0 mL Intravenous Stopped 3/2/24 1931)   iohexol (OMNIPAQUE) 350 MG/ML injection  (MULTI-DOSE) 100 mL (100 mL Intravenous Given 3/2/24 1733)       Diagnostic Studies  Results Reviewed       Procedure Component Value Units Date/Time    HS Troponin I 2hr [[id number]]  (Normal) Collected: 03/02/24 1853    Lab Status: Final result Specimen: Blood from Arm, Left Updated: 03/02/24 1921     hs TnI 2hr 2 ng/L      Delta 2hr hsTnI >0 ng/L     UA w Reflex to Microscopic w Reflex to Culture [[id number]]  (Abnormal) Collected: 03/02/24 1852    Lab Status: Final result Specimen: Urine, Clean Catch Updated: 03/02/24 1917     Color, UA Colorless     Clarity, UA Clear     Specific Gravity, UA >=1.050     pH, UA 5.5     Leukocytes, UA Negative     Nitrite, UA Negative     Protein, UA Negative mg/dl      Glucose, UA Negative mg/dl      Ketones, UA 20 (1+) mg/dl      Urobilinogen, UA <2.0 mg/dl      Bilirubin, UA Negative     Occult Blood, UA Negative    Lactic acid 2 Hours [[id number]]  (Normal) Collected: 03/02/24 1852    Lab Status: Final result Specimen: Blood from Arm, Left Updated: 03/02/24 1914     LACTIC ACID 1.4 mmol/L     Narrative:      Result may be elevated if tourniquet was used during collection.    HS Troponin I 4hr [[id number]]     Lab Status: No result Specimen: Blood     Comprehensive metabolic panel [[id number]]  (Abnormal) Collected: 03/02/24 1657    Lab Status: Final result Specimen: Blood from Arm, Left Updated: 03/02/24 1719     Sodium 137 mmol/L      Potassium 4.3 mmol/L      Chloride 103 mmol/L      CO2 23 mmol/L      ANION GAP 11 mmol/L      BUN 23 mg/dL      Creatinine 1.21 mg/dL      Glucose 173 mg/dL      Calcium 8.8 mg/dL      AST 18 U/L      ALT 21 U/L      Alkaline Phosphatase 53 U/L      Total Protein 6.8 g/dL      Albumin 4.0 g/dL      Total Bilirubin 0.59 mg/dL      eGFR 59 ml/min/1.73sq m     Narrative:      National Kidney Disease Foundation guidelines for Chronic Kidney Disease (CKD):     Stage 1 with normal or high GFR (GFR > 90 mL/min/1.73 square meters)    Stage 2 Mild CKD (GFR  = 60-89 mL/min/1.73 square meters)    Stage 3A Moderate CKD (GFR = 45-59 mL/min/1.73 square meters)    Stage 3B Moderate CKD (GFR = 30-44 mL/min/1.73 square meters)    Stage 4 Severe CKD (GFR = 15-29 mL/min/1.73 square meters)    Stage 5 End Stage CKD (GFR <15 mL/min/1.73 square meters)  Note: GFR calculation is accurate only with a steady state creatinine    Protime-INR [[id number]]  (Abnormal) Collected: 03/02/24 1617    Lab Status: Final result Specimen: Blood from Arm, Left Updated: 03/02/24 1709     Protime 15.6 seconds      INR 1.18    APTT [[id number]]  (Normal) Collected: 03/02/24 1617    Lab Status: Final result Specimen: Blood from Arm, Left Updated: 03/02/24 1709     PTT 29 seconds     FLU/RSV/COVID - if FLU/RSV clinically relevant [[id number]]  (Normal) Collected: 03/02/24 1617    Lab Status: Final result Specimen: Nares from Nose Updated: 03/02/24 1702     SARS-CoV-2 Negative     INFLUENZA A PCR Negative     INFLUENZA B PCR Negative     RSV PCR Negative    Narrative:      FOR PEDIATRIC PATIENTS - copy/paste COVID Guidelines URL to browser: [email or web address]    SARS-CoV-2 assay is a Nucleic Acid Amplification assay intended for the  qualitative detection of nucleic acid from SARS-CoV-2 in nasopharyngeal  swabs. Results are for the presumptive identification of SARS-CoV-2 RNA.    Positive results are indicative of infection with SARS-CoV-2, the virus  causing COVID-19, but do not rule out bacterial infection or co-infection  with other viruses. Laboratories within the [place] and its  territories are required to report all positive results to the appropriate  public health authorities. Negative results do not preclude SARS-CoV-2  infection and should not be used as the sole basis for treatment or other  patient management decisions. Negative results must be combined with  clinical observations, patient history, and epidemiological  information.  This test has not been FDA cleared or approved.    This test has been authorized by FDA under an Emergency Use Authorization  (EUA). This test is only authorized for the duration of time the  declaration that circumstances exist justifying the authorization of the  emergency use of an in vitro diagnostic tests for detection of SARS-CoV-2  virus and/or diagnosis of COVID-19 infection under section 564(b)(1) of  the Act, 21 U.S.C. 360bbb-3(b)(1), unless the authorization is terminated  or revoked sooner. The test has been validated but independent review by FDA  and CLIA is pending.    Test performed using [employer] GeneXpert: This RT-PCR assay targets N2,  a region unique to SARS-CoV-2. A conserved region in the E-gene was chosen  for pan-Sarbecovirus detection which includes SARS-CoV-2.    According to CMS-2020-01-R, this platform meets the definition of high-throughput technology.    RBC Morphology Reflex Test [[id number]] Collected: 03/02/24 1617    Lab Status: Final result Specimen: Blood from Arm, Left Updated: 03/02/24 1701    Manual Differential(PHLEBS Do Not Order) [[id number]]  (Abnormal) Collected: 03/02/24 1617    Lab Status: Final result Specimen: Blood from Arm, Left Updated: 03/02/24 1701     Segmented % 87 %      Lymphocytes % 8 %      Monocytes % 5 %      Eosinophils, % 0 %      Basophils % 0 %      Absolute Neutrophils 14.11 Thousand/uL      Lymphocytes Absolute 1.30 Thousand/uL      Monocytes Absolute 0.81 Thousand/uL      Eosinophils Absolute 0.00 Thousand/uL      Basophils Absolute 0.00 Thousand/uL      Total Counted --     RBC Morphology Normal     Platelet Estimate Adequate    CBC and differential [[id number]]  (Abnormal) Collected: 03/02/24 1617    Lab Status: Final result Specimen: Blood from Arm, Left Updated: 03/02/24 1700     WBC 16.22 Thousand/uL      RBC 5.64 Million/uL      Hemoglobin 16.8 g/dL      Hematocrit 49.3 %      MCV 87 fL      MCH 29.8 pg      MCHC 34.1 g/dL      RDW  12.0 %      MPV 9.1 fL      Platelets 344 Thousands/uL     Narrative:      This is an appended report.  These results have been appended to a previously verified report.    Lactic acid [[id number]]  (Abnormal) Collected: 03/02/24 1617    Lab Status: Final result Specimen: Blood from Arm, Left Updated: 03/02/24 1649     LACTIC ACID 3.5 mmol/L     Narrative:      Result may be elevated if tourniquet was used during collection.    Procalcitonin [[id number]]  (Normal) Collected: 03/02/24 1617    Lab Status: Final result Specimen: Blood from Arm, Left Updated: 03/02/24 1649     Procalcitonin <0.05 ng/ml     HS Troponin 0hr (reflex protocol) [[id number]]  (Normal) Collected: 03/02/24 1617    Lab Status: Final result Specimen: Blood from Arm, Left Updated: 03/02/24 1647     hs TnI 0hr <2 ng/L     Blood culture #2 [[id number]] Collected: 03/02/24 1617    Lab Status: In process Specimen: Blood from Arm, Right Updated: 03/02/24 1627    Blood culture #1 [[id number]] Collected: 03/02/24 1617    Lab Status: In process Specimen: Blood from Arm, Left Updated: 03/02/24 1621                   CT chest abdomen pelvis w contrast   Final Result by Vanessa Manuel MD (03/02 1937)      1) Several stacked, fluid-filled, and prominent loops of small bowel in the right hemiabdomen with adjacent mesenteric edema, with a focal area of luminal narrowing narrowing in one of the loops and a 2nd area of narrowing in a downstream segment,    located medially and more, and beyond which the rest of the small bowel is relatively collapsed. Overall the findings are concerning for an early small bowel obstruction, potentially a closed-loop obstruction given the configuration and likely 2    transition points. Emergent surgical consultation is recommended.      2) No loss of bowel wall enhancement or pneumatosis intestinalis to suggest bowel ischemia at this time, however mesenteric edema may indicate early ischemia. No pneumoperitoneum      3) Patent major  mesenteric arteries and veins.      4) Small abdominal ascites, mostly on the right, with some fluid also tracking into the pelvis. No organized collections to suggest an abscess.      5) Liquid stool is seen in the ascending, transverse, and proximal descending colon.      6) No acute pulmonary pathology.      7) Additional findings as above.      I personally discussed this study with JOHN AGUILLON on 3/2/2024 at  7:34 PM.                     Workstation performed: [id number]                    Procedures  Procedures         ED Course                               SBIRT 22yo+      Flowsheet Row Most Recent Value   Initial Alcohol Screen: US AUDIT-C     1. How often do you have a drink containing alcohol? 0 Filed at: 03/02/2024 1556   2. How many drinks containing alcohol do you have on a typical day you are drinking?  0 Filed at: 03/02/2024 1556   3a. Male UNDER 65: How often do you have five or more drinks on one occasion? 0 Filed at: 03/02/2024 1556   Audit-C Score 0 Filed at: 03/02/2024 1556   [place]: How many times in the past year have you...    Used an illegal drug or used a prescription medication for non-medical reasons? Never Filed at: 03/02/2024 1556                      Medical Decision Making  Amount and/or Complexity of Data Reviewed  Labs: ordered.  Radiology: ordered.    Risk  Prescription drug management.  Decision regarding hospitalization.           CT shows possible small bowel obstruction, lactic acid is normalized after IV fluids, patient feels improved, able to pass gas, pain controlled and no nausea.  I discussed CT findings with Dr. Ramos general surgery who recommends slim and admit, no surgical intervention at this time as patient is feeling improved and able to pass gas.  Will admit.  Disposition  Final diagnoses:   Abdominal pain     Time reflects when diagnosis was documented in both MDM as applicable and the Disposition within this note       Time User Action Codes Description Comment     3/2/2024  7:52 PM Sabrina Cid Add [R10.9] Abdominal pain           ED Disposition       ED Disposition   Admit    Condition   Stable    Date/Time   Sat Mar 2, 2024 1955    Comment   Case was discussed with Dr. Chaves and the patient's admission status was agreed to be Admission Status: inpatient status to the service of Dr. Chaves .               Follow-up Information    None         Patient's Medications   Discharge Prescriptions    No medications on file       No discharge procedures on file.    PDMP Review       None            ED Provider  Electronically Signed by             Sabrina Cid MD  03/02/24 1956

## 2024-03-03 LAB
ALBUMIN SERPL BCP-MCNC: 3.4 G/DL (ref 3.5–5)
ALP SERPL-CCNC: 45 U/L (ref 34–104)
ALT SERPL W P-5'-P-CCNC: 15 U/L (ref 7–52)
ANION GAP SERPL CALCULATED.3IONS-SCNC: 6 MMOL/L
AST SERPL W P-5'-P-CCNC: 13 U/L (ref 13–39)
BASOPHILS # BLD AUTO: 0.02 THOUSANDS/ÂΜL (ref 0–0.1)
BASOPHILS NFR BLD AUTO: 0 % (ref 0–1)
BILIRUB SERPL-MCNC: 0.65 MG/DL (ref 0.2–1)
BUN SERPL-MCNC: 17 MG/DL (ref 5–25)
CALCIUM ALBUM COR SERPL-MCNC: 8.5 MG/DL (ref 8.3–10.1)
CALCIUM SERPL-MCNC: 8 MG/DL (ref 8.4–10.2)
CHLORIDE SERPL-SCNC: 108 MMOL/L (ref 96–108)
CO2 SERPL-SCNC: 24 MMOL/L (ref 21–32)
CREAT SERPL-MCNC: 1.07 MG/DL (ref 0.6–1.3)
EOSINOPHIL # BLD AUTO: 0.08 THOUSAND/ÂΜL (ref 0–0.61)
EOSINOPHIL NFR BLD AUTO: 1 % (ref 0–6)
ERYTHROCYTE [DISTWIDTH] IN BLOOD BY AUTOMATED COUNT: 12.2 % (ref 11.6–15.1)
GFR SERPL CREATININE-BSD FRML MDRD: 68 ML/MIN/1.73SQ M
GLUCOSE SERPL-MCNC: 93 MG/DL (ref 65–140)
HCT VFR BLD AUTO: 38.2 % (ref 36.5–49.3)
HGB BLD-MCNC: 12.8 G/DL (ref 12–17)
IMM GRANULOCYTES # BLD AUTO: 0.01 THOUSAND/UL (ref 0–0.2)
IMM GRANULOCYTES NFR BLD AUTO: 0 % (ref 0–2)
LYMPHOCYTES # BLD AUTO: 1.2 THOUSANDS/ÂΜL (ref 0.6–4.47)
LYMPHOCYTES NFR BLD AUTO: 15 % (ref 14–44)
MCH RBC QN AUTO: 29.5 PG (ref 26.8–34.3)
MCHC RBC AUTO-ENTMCNC: 33.5 G/DL (ref 31.4–37.4)
MCV RBC AUTO: 88 FL (ref 82–98)
MONOCYTES # BLD AUTO: 0.81 THOUSAND/ÂΜL (ref 0.17–1.22)
MONOCYTES NFR BLD AUTO: 10 % (ref 4–12)
NEUTROPHILS # BLD AUTO: 6.11 THOUSANDS/ÂΜL (ref 1.85–7.62)
NEUTS SEG NFR BLD AUTO: 74 % (ref 43–75)
NRBC BLD AUTO-RTO: 0 /100 WBCS
PLATELET # BLD AUTO: 242 THOUSANDS/UL (ref 149–390)
PMV BLD AUTO: 9.1 FL (ref 8.9–12.7)
POTASSIUM SERPL-SCNC: 3.8 MMOL/L (ref 3.5–5.3)
PROT SERPL-MCNC: 5.6 G/DL (ref 6.4–8.4)
RBC # BLD AUTO: 4.34 MILLION/UL (ref 3.88–5.62)
SODIUM SERPL-SCNC: 138 MMOL/L (ref 135–147)
WBC # BLD AUTO: 8.23 THOUSAND/UL (ref 4.31–10.16)

## 2024-03-03 PROCEDURE — 80053 COMPREHEN METABOLIC PANEL: CPT | Performed by: STUDENT IN AN ORGANIZED HEALTH CARE EDUCATION/TRAINING PROGRAM

## 2024-03-03 PROCEDURE — 99232 SBSQ HOSP IP/OBS MODERATE 35: CPT

## 2024-03-03 PROCEDURE — 85025 COMPLETE CBC W/AUTO DIFF WBC: CPT | Performed by: STUDENT IN AN ORGANIZED HEALTH CARE EDUCATION/TRAINING PROGRAM

## 2024-03-03 RX ORDER — FLUOXETINE HYDROCHLORIDE 20 MG/1
40 CAPSULE ORAL DAILY
Status: DISCONTINUED | OUTPATIENT
Start: 2024-03-03 | End: 2024-03-04 | Stop reason: HOSPADM

## 2024-03-03 RX ORDER — ACETAMINOPHEN 325 MG/1
975 TABLET ORAL EVERY 8 HOURS PRN
Status: DISCONTINUED | OUTPATIENT
Start: 2024-03-03 | End: 2024-03-04 | Stop reason: HOSPADM

## 2024-03-03 RX ORDER — PRAVASTATIN SODIUM 80 MG/1
80 TABLET ORAL
Status: DISCONTINUED | OUTPATIENT
Start: 2024-03-03 | End: 2024-03-04 | Stop reason: HOSPADM

## 2024-03-03 RX ORDER — LISINOPRIL 10 MG/1
10 TABLET ORAL DAILY
Status: DISCONTINUED | OUTPATIENT
Start: 2024-03-03 | End: 2024-03-04 | Stop reason: HOSPADM

## 2024-03-03 RX ADMIN — LISINOPRIL 10 MG: 10 TABLET ORAL at 15:47

## 2024-03-03 RX ADMIN — ENOXAPARIN SODIUM 40 MG: 40 INJECTION SUBCUTANEOUS at 09:37

## 2024-03-03 RX ADMIN — FLUOXETINE HYDROCHLORIDE 40 MG: 20 CAPSULE ORAL at 15:47

## 2024-03-03 RX ADMIN — PRAVASTATIN SODIUM 80 MG: 80 TABLET ORAL at 15:48

## 2024-03-03 NOTE — PROGRESS NOTES
[facility]   PROGRESS NOTE- Zach Encarnacion, 1950, 73 y.o. male MRN: [id number]   Unit/Bed#: /-01 Encounter: [id number]   Primary Care Physician: Marciano Sultana PA-C   Date and Time Admitted to Hospital: 3/2/2024  3:55 PM     Assessment/Plan:   SBO (small bowel obstruction) (HCC)  Assessment & Plan  72 yo M with lower abdominal pain since this morning with nausea and chills. No vomitting. Last BM this morning  CT shows possible SBO. However No loss of bowel wall enhancement or pneumatosis intestinalis to suggest bowel ischemia. WBC 16k and lactic initially 3.5 s/p IVF and normalized.   Abdominal pain significantly improved with nausea resolved. Tolerated CLD well.     Plan:  Monitor clinically with serial abdominal exams  Advance diet to surgical soft  Zofran IV as needed for n/v  Appreciate GS recommendations, no surgical intervention at this time    OCD (obsessive compulsive disorder)  Assessment & Plan  Continue PTA Prozac    Stage 3 chronic kidney disease (HCC)  Assessment & Plan  Lab Results   Component Value Date    EGFR 68 03/03/2024    EGFR 59 03/02/2024    EGFR 56 02/23/2024    CREATININE 1.07 03/03/2024    CREATININE 1.21 03/02/2024    CREATININE 1.27 02/23/2024     Cr at baseline   Monitor BMP  Avoid relative hypotension    Hyperlipidemia, mixed  Assessment & Plan  Continue PTA statin       VTE Pharmacologic Prophylaxis: VTE Score: 3 Moderate Risk (Score 3-4) - Pharmacological DVT Prophylaxis Ordered: enoxaparin (Lovenox).    Patient Centered Rounds:  Performed bedside rounds with nursing staff.   Discussions with Specialists or Other Care Team Provider: General surgery  Education and Discussions with Family / Patient: Updated  (wife) at bedside.    Current Length of Stay: 1 day(s)  Current Patient Status: Inpatient   Discharge Plan: Anticipate discharge tomorrow to home.    Code Status: Level 1 - Full Code    Subjective:   Patient was seen and  examined at bedside this a.m. in no acute distress. He reports abdominal pain significantly improved essentially resolved and has had no nausea or vomiting. He denies fever, chills, chest pain, SOB, constipation or diarrhea. He remains hemodynamically stable with no acute concerns at this time.    Objective:     Vitals:   Temp (24hrs), Av.4 °F (36.9 °C), Min:98.3 °F (36.8 °C), Max:98.5 °F (36.9 °C)    Temp:  [98.3 °F (36.8 °C)-98.5 °F (36.9 °C)] 98.3 °F (36.8 °C)  HR:  [59-68] 65  Resp:  [15-20] 15  BP: (105-132)/(59-69) 132/69  SpO2:  [92 %-97 %] 97 %  Body mass index is 27.38 kg/m².     Input and Output Summary (last 24 hours):     Intake/Output Summary (Last 24 hours) at 3/3/2024 1605  Last data filed at 3/3/2024 0802  Gross per 24 hour   Intake 3099 ml   Output --   Net 3099 ml       Physical Exam  Constitutional:       General: He is not in acute distress.     Appearance: He is not toxic-appearing.   HENT:      Head: Normocephalic and atraumatic.      Mouth/Throat:      Mouth: Mucous membranes are moist.   Cardiovascular:      Rate and Rhythm: Normal rate and regular rhythm.      Heart sounds: Normal heart sounds.   Pulmonary:      Effort: No respiratory distress.      Breath sounds: Normal breath sounds. No wheezing, rhonchi or rales.   Abdominal:      General: Bowel sounds are normal. There is no distension.      Palpations: Abdomen is soft.      Tenderness: There is abdominal tenderness (very mild) in the right lower quadrant.   Musculoskeletal:         General: Normal range of motion.      Cervical back: Neck supple.      Right lower leg: No edema.      Left lower leg: No edema.   Skin:     General: Skin is warm and dry.      Findings: No lesion or rash.   Neurological:      General: No focal deficit present.      Mental Status: He is alert and oriented to person, place, and time.      Motor: No weakness.   Psychiatric:         Behavior: Behavior normal.          Additional Data:     Labs:  Results from  last 7 days   Lab Units 03/03/24  0618   WBC Thousand/uL 8.23   HEMOGLOBIN g/dL 12.8   HEMATOCRIT % 38.2   PLATELETS Thousands/uL 242   NEUTROS PCT % 74   LYMPHS PCT % 15   MONOS PCT % 10   EOS PCT % 1     Results from last 7 days   Lab Units 03/03/24  0452   SODIUM mmol/L 138   POTASSIUM mmol/L 3.8   CHLORIDE mmol/L 108   CO2 mmol/L 24   BUN mg/dL 17   CREATININE mg/dL 1.07   ANION GAP mmol/L 6   CALCIUM mg/dL 8.0*   ALBUMIN g/dL 3.4*   TOTAL BILIRUBIN mg/dL 0.65   ALK PHOS U/L 45   ALT U/L 15   AST U/L 13   GLUCOSE RANDOM mg/dL 93     Results from last 7 days   Lab Units 03/02/24  1617   INR  1.18             Results from last 7 days   Lab Units 03/02/24  1852 03/02/24  1617   LACTIC ACID mmol/L 1.4 3.5*   PROCALCITONIN ng/ml  --  <0.05       Lines/Drains:  Invasive Devices       Peripheral Intravenous Line  Duration             Peripheral IV 03/02/24 Left Antecubital <1 day              Drain  Duration             Ureteral Drain/Stent Left ureter 6 Fr. 1760 days                        Imaging: Reviewed pertinent radiology reports from this admission.   Recent Cultures (last 7 days):   Results from last 7 days   Lab Units 03/02/24  1617   BLOOD CULTURE  Received in Microbiology Lab. Culture in Progress.  Received in Microbiology Lab. Culture in Progress.       Last 24 Hours Medication List:   Current Facility-Administered Medications   Medication Dose Route Frequency Provider Last Rate    acetaminophen  975 mg Oral Q8H PRN Maddison Rebolledo MD      enoxaparin  40 mg Subcutaneous Daily Serge Garrido DO      FLUoxetine  40 mg Oral Daily Maddison Rebolledo MD      lisinopril  10 mg Oral Daily Maddison Rebolledo MD      ondansetron  4 mg Intravenous Q8H PRN Serge Garrido,       pravastatin  80 mg Oral Daily With Dinner Maddison Rebolledo MD          Today, Patient Was Seen By: Maddison Rebolledo MD    **Please Note: This note may have been constructed using a voice recognition system.**

## 2024-03-03 NOTE — ASSESSMENT & PLAN NOTE
Lab Results   Component Value Date    EGFR 68 03/04/2024    EGFR 68 03/03/2024    EGFR 59 03/02/2024    CREATININE 1.08 03/04/2024    CREATININE 1.07 03/03/2024    CREATININE 1.21 03/02/2024     Cr at baseline   Monitor BMP  Avoid relative hypotension

## 2024-03-03 NOTE — CONSULTS
Consultation - General Surgery  Zach Encarnacion 73 y.o. male MRN: [id number]  Unit/Bed#: -01 Encounter: [id number]                                                  Inpatient consult to Acute Care Surgery  Consult performed by: Carmen Wang PA-C  Consult ordered by: Serge Garrido, DO        Assessment   Zach Encarnacion is a 73 y.o. year old male with abdominal pain nausea and vomiting  Enteritis versus small bowel obstruction  Hypertension  CKD 3    Patient presented with sudden onset of abdominal pain with associated nausea and vomiting.  Last bowel movement yesterday morning.  No history of bowel obstruction in the past.  Patient had leukocytosis of 16.2 to and lactic acid of 3.5 on admission.  This resolved with IV fluids.  CT showed concern for possible small bowel obstruction with distended fluid-filled small bowel loops.  Patient symptoms have resolved today but he is not yet passing flatus    Plan  No acute surgical intervention.  Likely enteritis or viral picture  Okay to trial clear liquid diet.  Monitor toleration  If patient is tolerating diet may advance as tolerated  Continue gentle IV fluids until patient tolerating oral intake  Courage out of bed and ambulation  Continue with abdominal exams  Monitor labs and vitals  Analgesia and antiemetics, as needed  DVT prophylaxis  Rest of care per primary team  Nothing further from surgery standpoint.  We will sign off.  Please contact with any further questions or need to see patient again    ______________________________________________________________________    CHIEF COMPLAINT: Abdominal pain, nausea and vomiting    HPI: Zach Encarnacion is a 73 y.o. year old male with PMHx of hypertension, CKD 3, kidney stones,  And history of prostate cancer s/p robotic prostatectomy.  Patient presented with lower abdominal pain, nausea and vomiting yesterday.  Patient states pain started after having bowel movement yesterday and then became worse.  Denies any  fevers or chills.  Denies any bowel movement since yesterday.  Denies passing flatus today.  Denies any shortness of breath or chest pain.  Today he is feeling much better and denies any abdominal pain, nausea or vomiting.  His abdomen is soft and nontender on exam.  He has never had an episode such as this in the past.    Review of Systems   Constitutional:  Positive for appetite change. Negative for activity change, chills and fever.   HENT: Negative.  Negative for congestion, postnasal drip and sore throat.    Eyes: Negative.    Respiratory: Negative.  Negative for cough, shortness of breath and wheezing.    Cardiovascular:  Negative for chest pain and palpitations.   Gastrointestinal:  Negative for abdominal distention, abdominal pain, constipation, diarrhea, nausea and vomiting.   Endocrine: Negative.    Genitourinary: Negative.  Negative for difficulty urinating, dysuria and frequency.   Musculoskeletal: Negative.    Skin: Negative.  Negative for color change and wound.   Neurological: Negative.  Negative for dizziness, light-headedness and headaches.   Hematological: Negative.    Psychiatric/Behavioral: Negative.     All other systems reviewed and are negative.    Meds/Allergies   No Known Allergies   all current active meds have been reviewed       Historical Information   Past Medical History:   Diagnosis Date    Arthritis     Cancer (HCC)     prostate    Hyperlipidemia     Hypertension     OCD (obsessive compulsive disorder)      Past Surgical History:   Procedure Laterality Date    FINGER AMPUTATION Left     partial middle and ring    FL RETROGRADE PYELOGRAM  04/05/2019    FL RETROGRADE PYELOGRAM  05/09/2019    KNEE ARTHROSCOPY Right     acl    KNEE SURGERY Right     menisectomy    IN CYSTO/URETERO W/LITHOTRIPSY &INDWELL STENT INSRT Left 04/05/2019    Procedure: CYSTOSCOPY URETEROSCOPY WITH  INSERTION STENT URETERAL;  Surgeon: Naren Paul MD;  [facility];  Service: Urology    IN  CYSTO/URETERO W/LITHOTRIPSY &INDWELL STENT INSRT Left 05/09/2019    Procedure: CYSTOSCOPY URETEROSCOPY WITH LITHOTRIPSY HOLMIUM LASER, RETROGRADE PYELOGRAM AND INSERTION STENT URETERAL;  Surgeon: Naren Paul MD;  [facility];  Service: Urology    IL LAPS SURG [id number] RPBIC RAD W/NRV SPARING ROBOT N/A 03/18/2019    Procedure: ROBOTIC RADICAL PROSTATECTOMY; BILATERAL PELVIC LYMPH NODE DISSECTION;  Surgeon: Naren Paul MD;  [facility];  Service: Urology     Social History   Social History     Substance and Sexual Activity   Alcohol Use No     Social History     Substance and Sexual Activity   Drug Use No     Social History     Tobacco Use   Smoking Status Former   Smokeless Tobacco Never   Tobacco Comments    30+ years ago       Family History: non-contributory      Objective   Lab Results:   Recent Results (from the past 36 hour(s))   CBC and differential    Collection Time: 03/02/24  4:17 PM   Result Value Ref Range    WBC 16.22 (H) 4.31 - 10.16 Thousand/uL    RBC 5.64 (H) 3.88 - 5.62 Million/uL    Hemoglobin 16.8 12.0 - 17.0 g/dL    Hematocrit 49.3 36.5 - 49.3 %    MCV 87 82 - 98 fL    MCH 29.8 26.8 - 34.3 pg    MCHC 34.1 31.4 - 37.4 g/dL    RDW 12.0 11.6 - 15.1 %    MPV 9.1 8.9 - 12.7 fL    Platelets 344 149 - 390 Thousands/uL   Lactic acid    Collection Time: 03/02/24  4:17 PM   Result Value Ref Range    LACTIC ACID 3.5 (HH) 0.5 - 2.0 mmol/L   Procalcitonin    Collection Time: 03/02/24  4:17 PM   Result Value Ref Range    Procalcitonin <0.05 <=0.25 ng/ml   Protime-INR    Collection Time: 03/02/24  4:17 PM   Result Value Ref Range    Protime 15.6 (H) 11.6 - 14.5 seconds    INR 1.18 0.84 - 1.19   APTT    Collection Time: 03/02/24  4:17 PM   Result Value Ref Range    PTT 29 23 - 37 seconds   Blood culture #1    Collection Time: 03/02/24  4:17 PM    Specimen: Arm, Left; Blood   Result Value Ref Range    Blood Culture Received in Microbiology Lab. Culture in Progress.    FLU/RSV/COVID - if  "FLU/RSV clinically relevant    Collection Time: 03/02/24  4:17 PM    Specimen: Nose; Nares   Result Value Ref Range    SARS-CoV-2 Negative Negative    INFLUENZA A PCR Negative Negative    INFLUENZA B PCR Negative Negative    RSV PCR Negative Negative   HS Troponin 0hr (reflex protocol)    Collection Time: 03/02/24  4:17 PM   Result Value Ref Range    hs TnI 0hr <2 \"Refer to ACS Flowchart\"- see link ng/L   Blood culture #2    Collection Time: 03/02/24  4:17 PM    Specimen: Arm, Right; Blood   Result Value Ref Range    Blood Culture Received in Microbiology Lab. Culture in Progress.    Manual Differential(PHLEBS Do Not Order)    Collection Time: 03/02/24  4:17 PM   Result Value Ref Range    Segmented % 87 (H) 43 - 75 %    Lymphocytes % 8 (L) 14 - 44 %    Monocytes % 5 4 - 12 %    Eosinophils, % 0 0 - 6 %    Basophils % 0 0 - 1 %    Absolute Neutrophils 14.11 (H) 1.85 - 7.62 Thousand/uL    Lymphocytes Absolute 1.30 0.60 - 4.47 Thousand/uL    Monocytes Absolute 0.81 0.00 - 1.22 Thousand/uL    Eosinophils Absolute 0.00 0.00 - 0.40 Thousand/uL    Basophils Absolute 0.00 0.00 - 0.10 Thousand/uL    Total Counted      RBC Morphology Normal     Platelet Estimate Adequate Adequate   Comprehensive metabolic panel    Collection Time: 03/02/24  4:57 PM   Result Value Ref Range    Sodium 137 135 - 147 mmol/L    Potassium 4.3 3.5 - 5.3 mmol/L    Chloride 103 96 - 108 mmol/L    CO2 23 21 - 32 mmol/L    ANION GAP 11 mmol/L    BUN 23 5 - 25 mg/dL    Creatinine 1.21 0.60 - 1.30 mg/dL    Glucose 173 (H) 65 - 140 mg/dL    Calcium 8.8 8.4 - 10.2 mg/dL    AST 18 13 - 39 U/L    ALT 21 7 - 52 U/L    Alkaline Phosphatase 53 34 - 104 U/L    Total Protein 6.8 6.4 - 8.4 g/dL    Albumin 4.0 3.5 - 5.0 g/dL    Total Bilirubin 0.59 0.20 - 1.00 mg/dL    eGFR 59 ml/min/1.73sq m   UA w Reflex to Microscopic w Reflex to Culture    Collection Time: 03/02/24  6:52 PM    Specimen: Urine, Clean Catch   Result Value Ref Range    Color, UA Colorless     " "Clarity, UA Clear     Specific Gravity, UA >=1.050 (H) 1.003 - 1.030    pH, UA 5.5 4.5, 5.0, 5.5, 6.0, 6.5, 7.0, 7.5, 8.0    Leukocytes, UA Negative Negative    Nitrite, UA Negative Negative    Protein, UA Negative Negative mg/dl    Glucose, UA Negative Negative mg/dl    Ketones, UA 20 (1+) (A) Negative mg/dl    Urobilinogen, UA <2.0 <2.0 mg/dl mg/dl    Bilirubin, UA Negative Negative    Occult Blood, UA Negative Negative   Lactic acid 2 Hours    Collection Time: 03/02/24  6:52 PM   Result Value Ref Range    LACTIC ACID 1.4 0.5 - 2.0 mmol/L   HS Troponin I 2hr    Collection Time: 03/02/24  6:53 PM   Result Value Ref Range    hs TnI 2hr 2 \"Refer to ACS Flowchart\"- see link ng/L    Delta 2hr hsTnI >0 <20 ng/L   Comprehensive metabolic panel    Collection Time: 03/03/24  4:52 AM   Result Value Ref Range    Sodium 138 135 - 147 mmol/L    Potassium 3.8 3.5 - 5.3 mmol/L    Chloride 108 96 - 108 mmol/L    CO2 24 21 - 32 mmol/L    ANION GAP 6 mmol/L    BUN 17 5 - 25 mg/dL    Creatinine 1.07 0.60 - 1.30 mg/dL    Glucose 93 65 - 140 mg/dL    Calcium 8.0 (L) 8.4 - 10.2 mg/dL    Corrected Calcium 8.5 8.3 - 10.1 mg/dL    AST 13 13 - 39 U/L    ALT 15 7 - 52 U/L    Alkaline Phosphatase 45 34 - 104 U/L    Total Protein 5.6 (L) 6.4 - 8.4 g/dL    Albumin 3.4 (L) 3.5 - 5.0 g/dL    Total Bilirubin 0.65 0.20 - 1.00 mg/dL    eGFR 68 ml/min/1.73sq m   CBC and differential    Collection Time: 03/03/24  6:18 AM   Result Value Ref Range    WBC 8.23 4.31 - 10.16 Thousand/uL    RBC 4.34 3.88 - 5.62 Million/uL    Hemoglobin 12.8 12.0 - 17.0 g/dL    Hematocrit 38.2 36.5 - 49.3 %    MCV 88 82 - 98 fL    MCH 29.5 26.8 - 34.3 pg    MCHC 33.5 31.4 - 37.4 g/dL    RDW 12.2 11.6 - 15.1 %    MPV 9.1 8.9 - 12.7 fL    Platelets 242 149 - 390 Thousands/uL    nRBC 0 /100 WBCs    Neutrophils Relative 74 43 - 75 %    Immat GRANS % 0 0 - 2 %    Lymphocytes Relative 15 14 - 44 %    Monocytes Relative 10 4 - 12 %    Eosinophils Relative 1 0 - 6 %    Basophils " "Relative 0 0 - 1 %    Neutrophils Absolute 6.11 1.85 - 7.62 Thousands/µL    Immature Grans Absolute 0.01 0.00 - 0.20 Thousand/uL    Lymphocytes Absolute 1.20 0.60 - 4.47 Thousands/µL    Monocytes Absolute 0.81 0.17 - 1.22 Thousand/µL    Eosinophils Absolute 0.08 0.00 - 0.61 Thousand/µL    Basophils Absolute 0.02 0.00 - 0.10 Thousands/µL         Intake/Output Summary (Last 24 hours) at 3/3/2024 0823  Last data filed at 3/2/2024 1931  Gross per 24 hour   Intake 2100 ml   Output --   Net 2100 ml     Invasive Devices       Peripheral Intravenous Line  Duration             Peripheral IV 03/02/24 Left Antecubital <1 day              Drain  Duration             Ureteral Drain/Stent Left ureter 6 Fr. 1759 days                    Physical Exam  Vitals: /63   Pulse 65   Temp 98.3 °F (36.8 °C)   Resp 15   Ht 5' 9\" (1.753 m)   Wt 84.1 kg (185 lb 6.5 oz)   SpO2 95%   BMI 27.38 kg/m²   GEN: A & O x 3, cooperative, No acute distress, appears stated age  HEENT: pupils equal and round, EOMI, sclera anicterus, oral mucosa pink/moist  NECK: supple  LUNGS: clear throughout  ABD: +BS, abdomen soft and nondistended, nontender to palpation, no guarding, no rigidity  EXTREM: FROM no joint deformities. +pulses  SKIN: warm, dry, no rash, no jaundice  NEURO: CN II -XII intact, no tremor, affect appropriate    Imaging Studies:   CT chest abdomen pelvis w contrast  Result Date: 3/2/2024  Impression: 1) Several stacked, fluid-filled, and prominent loops of small bowel in the right hemiabdomen with adjacent mesenteric edema, with a focal area of luminal narrowing narrowing in one of the loops and a 2nd area of narrowing in a downstream segment, located medially and more, and beyond which the rest of the small bowel is relatively collapsed. Overall the findings are concerning for an early small bowel obstruction, potentially a closed-loop obstruction given the configuration and likely 2 transition points. Emergent surgical " consultation is recommended. 2) No loss of bowel wall enhancement or pneumatosis intestinalis to suggest bowel ischemia at this time, however mesenteric edema may indicate early ischemia. No pneumoperitoneum 3) Patent major mesenteric arteries and veins. 4) Small abdominal ascites, mostly on the right, with some fluid also tracking into the pelvis. No organized collections to suggest an abscess. 5) Liquid stool is seen in the ascending, transverse, and proximal descending colon. 6) No acute pulmonary pathology. 7) Additional findings as above. I personally discussed this study with JOHN AGUILLON on 3/2/2024 at  7:34 PM. Workstation performed: [id number]         Carmen Wang PA-C  3/3/2024

## 2024-03-03 NOTE — H&P
formerly [facility]  H&P  Name: Zach Encarnacion 73 y.o. male I MRN: [id number]  Unit/Bed#: -01 I Date of Admission: 3/2/2024   Date of Service: 3/2/2024 I Hospital Day: 0      Assessment/Plan   SBO (small bowel obstruction) (HCC)  Assessment & Plan  74 yo M with lower abdominal pain since this morning with nausea and chills. No vomitting. Last BM this morning  CT shows possible SBO. However No loss of bowel wall enhancement or pneumatosis intestinalis to suggest bowel ischemia  WBC 16k and lactic initially 3.5 s/p IVF and normalized  At the time of admission patient is passing gas, no nausea/vomiting   General surgery consulted - appreciate reccs  Requested for SLIM admit for observation  No surgical intervention at this time  Maintain NPO status, advance to clear liquids as able  Continue IVF w  cc x 10 hr  PRN zofran for nausea   Tylenol for pain control, avoiding opiods  Holding off on NGT tube at this time, consider insertion should pain worsen / N/V    * Abdominal pain  Assessment & Plan  See a/p above    OCD (obsessive compulsive disorder)  Assessment & Plan  Hold home prozac at this time    Stage 3 chronic kidney disease (HCC)  Assessment & Plan  Lab Results   Component Value Date    EGFR 59 03/02/2024    EGFR 56 02/23/2024    EGFR 55 08/11/2023    CREATININE 1.21 03/02/2024    CREATININE 1.27 02/23/2024    CREATININE 1.28 08/11/2023   Cr at baseline   Monitor BMP  Avoid relative hypotension    Prostate cancer (HCC)  Assessment & Plan  S/p prostatectomy   O/p follow up    Hyperlipidemia, mixed  Assessment & Plan  Holding home crestor 2/2 NPO status    Essential hypertension  Assessment & Plan  Holding home lisinopril 2/2 NPO status       VTE Pharmacologic Prophylaxis: VTE Score: 3 Moderate Risk (Score 3-4) - Pharmacological DVT Prophylaxis Ordered: enoxaparin (Lovenox).  Code Status: Level 1 - Full Code   Discussion with family: Updated  (wife) at  bedside.    Anticipated Length of Stay: Patient will be admitted on an inpatient basis with an anticipated length of stay of greater than 2 midnights secondary to SBO, abdominal pain.    Total Time Spent on Date of Encounter in care of patient: 35 mins. This time was spent on one or more of the following: performing physical exam; counseling and coordination of care; obtaining or reviewing history; documenting in the medical record; reviewing/ordering tests, medications or procedures; communicating with other healthcare professionals and discussing with patient's family/caregivers.    Chief Complaint: abominal pain, nausea constipation     History of Present Illness:  Zach Encarnacion is a 73 y.o. male with a PMH  HTN, CKD 3, prostate cancer s/p prostatectomy, HLD, OC who presents with 1 day onset RLQ abdominal pain since this morning with nausea and chills. Last bowel movement reported this morning. CT shows possible early SBO however No loss of bowel wall enhancement or pneumatosis intestinalis to suggest bowel ischemia. Additional work up was remarkable for leukocytosis of WBC 16k and elevated lactic initially 3.5 without metabolic acidosis. Electolytes wnl. Patient afebrile. Patient received NS 2L bolus as well as tylenol IV and zofran. On recheck lactate had normalized after fluids. Patient report he began passing gas, no nausea/vomiting since ED arrival. General surgery was curbsided and requested for SLIM admit for observation, no surgical intervention at this time    Patient examined at bedside in no acute distress. Well appearing. Reports feeling significantly better since 4pm today. Some mild discomfort in RLQ reported to palpation. No nausea at the the moment. Reports mild distention, unchanged since this morning    Review of Systems:  Review of Systems   Constitutional:  Negative for chills and fever.   HENT:  Negative for ear pain and sore throat.    Eyes:  Negative for pain and visual disturbance.    Respiratory:  Negative for cough and shortness of breath.    Cardiovascular:  Negative for chest pain and palpitations.   Gastrointestinal:  Positive for abdominal distention, constipation and nausea. Negative for abdominal pain and vomiting.   Genitourinary:  Negative for dysuria and hematuria.   Musculoskeletal:  Negative for arthralgias and back pain.   Skin:  Negative for color change and rash.   Neurological:  Negative for seizures and syncope.   All other systems reviewed and are negative.      Past Medical and Surgical History:   Past Medical History:   Diagnosis Date    Arthritis     Cancer (HCC)     prostate    Hyperlipidemia     Hypertension     OCD (obsessive compulsive disorder)        Past Surgical History:   Procedure Laterality Date    FINGER AMPUTATION Left     partial middle and ring    FL RETROGRADE PYELOGRAM  04/05/2019    FL RETROGRADE PYELOGRAM  05/09/2019    KNEE ARTHROSCOPY Right     acl    KNEE SURGERY Right     menisectomy    [place] CYSTO/URETERO W/LITHOTRIPSY &INDWELL STENT INSRT Left 04/05/2019    Procedure: CYSTOSCOPY URETEROSCOPY WITH  INSERTION STENT URETERAL;  Surgeon: Naren Paul MD;  [facility];  Service: Urology    NV CYSTO/URETERO W/LITHOTRIPSY &INDWELL STENT INSRT Left 05/09/2019    Procedure: CYSTOSCOPY URETEROSCOPY WITH LITHOTRIPSY HOLMIUM LASER, RETROGRADE PYELOGRAM AND INSERTION STENT URETERAL;  Surgeon: Naren Paul MD;  [facility];  Service: Urology    NV LAPS SURG [id number] RPBIC RAD W/NRV SPARING ROBOT N/A 03/18/2019    Procedure: ROBOTIC RADICAL PROSTATECTOMY; BILATERAL PELVIC LYMPH NODE DISSECTION;  Surgeon: Naren Paul MD;  [facility];  Service: Urology       Meds/Allergies:  Prior to Admission medications    Medication Sig Start Date End Date Taking? Authorizing Provider   co-enzyme Q-10 30 MG capsule Take 200 mg by mouth daily    Historical Provider, MD   FLUoxetine (PROzac) 20 mg capsule TAKE ONE CAPSULE BY MOUTH TWICE A DAY  12/19/23   Marciano Sultana PA-C   lisinopril (ZESTRIL) 10 mg tablet Take 1 tablet (10 mg total) by mouth daily 2/7/24   Marciano Sultana PA-C   rosuvastatin (CRESTOR) 10 MG tablet Take 1 tablet (10 mg total) by mouth daily 2/7/24   Marciano Sultana PA-C   sildenafil (REVATIO) 20 mg tablet Take 2-3 tablets by mouth nightly 9/18/19   Naren Paul MD     I have reviewed home medications with patient personally.    Allergies: No Known Allergies    Social History:  Marital Status: /Civil Union   Occupation: retired  Patient Pre-hospital Living Situation: Home, With spouse  Patient Pre-hospital Level of Mobility: walks  Patient Pre-hospital Diet Restrictions: none  Substance Use History:   Social History     Substance and Sexual Activity   Alcohol Use No     Social History     Tobacco Use   Smoking Status Former   Smokeless Tobacco Never   Tobacco Comments    30+ years ago     Social History     Substance and Sexual Activity   Drug Use No       Family History:  Family History   Problem Relation Age of Onset    No Known Problems Mother     No Known Problems Father     Cancer Sister     No Known Problems Brother        Physical Exam:     Vitals:   Blood Pressure: 109/59 (03/02/24 1700)  Pulse: 68 (03/02/24 1700)  Temperature: 98.4 °F (36.9 °C) (03/02/24 1700)  Temp Source: Oral (03/02/24 1700)  Respirations: 20 (03/02/24 1700)  SpO2: 92 % (03/02/24 1700)    Physical Exam  Vitals and nursing note reviewed.   Constitutional:       General: He is not in acute distress.     Appearance: He is well-developed.   HENT:      Head: Normocephalic and atraumatic.   Eyes:      Extraocular Movements: Extraocular movements intact.      Conjunctiva/sclera: Conjunctivae normal.      Pupils: Pupils are equal, round, and reactive to light.   Cardiovascular:      Rate and Rhythm: Normal rate and regular rhythm.      Heart sounds: No murmur heard.  Pulmonary:      Effort: Pulmonary effort is normal. No respiratory distress.      Breath  sounds: Normal breath sounds.   Abdominal:      General: Bowel sounds are increased. There is distension.      Palpations: Abdomen is soft.      Tenderness: There is abdominal tenderness in the right lower quadrant. There is no guarding or rebound.   Musculoskeletal:         General: No swelling.      Cervical back: Neck supple.      Right lower leg: No edema.      Left lower leg: No edema.   Skin:     General: Skin is warm and dry.      Capillary Refill: Capillary refill takes less than 2 seconds.   Neurological:      General: No focal deficit present.      Mental Status: He is alert and oriented to person, place, and time.   Psychiatric:         Mood and Affect: Mood normal.          Additional Data:     Lab Results:  Results from last 7 days   Lab Units 03/02/24  1617   WBC Thousand/uL 16.22*   HEMOGLOBIN g/dL 16.8   HEMATOCRIT % 49.3   PLATELETS Thousands/uL 344   LYMPHO PCT % 8*   MONO PCT % 5   EOS PCT % 0     Results from last 7 days   Lab Units 03/02/24  1657   SODIUM mmol/L 137   POTASSIUM mmol/L 4.3   CHLORIDE mmol/L 103   CO2 mmol/L 23   BUN mg/dL 23   CREATININE mg/dL 1.21   ANION GAP mmol/L 11   CALCIUM mg/dL 8.8   ALBUMIN g/dL 4.0   TOTAL BILIRUBIN mg/dL 0.59   ALK PHOS U/L 53   ALT U/L 21   AST U/L 18   GLUCOSE RANDOM mg/dL 173*     Results from last 7 days   Lab Units 03/02/24  1617   INR  1.18             Results from last 7 days   Lab Units 03/02/24  1852 03/02/24  1617   LACTIC ACID mmol/L 1.4 3.5*   PROCALCITONIN ng/ml  --  <0.05       Lines/Drains:  Invasive Devices       Peripheral Intravenous Line  Duration             Peripheral IV 03/02/24 Left Antecubital <1 day              Drain  Duration             Ureteral Drain/Stent Left ureter 6 Fr. 1759 days                        Imaging: Reviewed radiology reports from this admission including: abdominal/pelvic CT  CT chest abdomen pelvis w contrast   Final Result by Vanessa Manuel MD (03/02 1937)      1) Several stacked, fluid-filled, and  prominent loops of small bowel in the right hemiabdomen with adjacent mesenteric edema, with a focal area of luminal narrowing narrowing in one of the loops and a 2nd area of narrowing in a downstream segment,    located medially and more, and beyond which the rest of the small bowel is relatively collapsed. Overall the findings are concerning for an early small bowel obstruction, potentially a closed-loop obstruction given the configuration and likely 2    transition points. Emergent surgical consultation is recommended.      2) No loss of bowel wall enhancement or pneumatosis intestinalis to suggest bowel ischemia at this time, however mesenteric edema may indicate early ischemia. No pneumoperitoneum      3) Patent major mesenteric arteries and veins.      4) Small abdominal ascites, mostly on the right, with some fluid also tracking into the pelvis. No organized collections to suggest an abscess.      5) Liquid stool is seen in the ascending, transverse, and proximal descending colon.      6) No acute pulmonary pathology.      7) Additional findings as above.      I personally discussed this study with JOHN AGUILLON on 3/2/2024 at  7:34 PM.                     Workstation performed: [id number]             EKG and Other Studies Reviewed on Admission:   EKG: No EKG obtained.    ** Please Note: This note has been constructed using a voice recognition system. **

## 2024-03-03 NOTE — UTILIZATION REVIEW
Initial Clinical Review    Admission: Date/Time/Statement:   Admission Orders (From admission, onward)       Ordered        03/02/24 1955  INPATIENT ADMISSION  Once                          Orders Placed This Encounter   Procedures    INPATIENT ADMISSION     Standing Status:   Standing     Number of Occurrences:   1     Order Specific Question:   Level of Care     Answer:   Med Surg [16]     Order Specific Question:   Estimated length of stay     Answer:   More than 2 Midnights     Order Specific Question:   Certification     Answer:   I certify that inpatient services are medically necessary for this patient for a duration of greater than two midnights. See H&P and MD Progress Notes for additional information about the patient's course of treatment.     ED Arrival Information       Expected   -    Arrival   3/2/2024 15:50    Acuity   Emergent              Means of arrival   Walk-In    Escorted by   Spouse    Service   Hospitalist    Admission type   Emergency              Arrival complaint   Abdominal Pain             Chief Complaint   Patient presents with    Abdominal Pain     Abd pain across lower abd that began this AM, some nausea and chills        Initial Presentation: 73 y.o. male to ED presents for RLQ abdominal pain x1 day with nausea and chills. Last BM this am. CT shows possible early SBO however No loss of bowel wall enhancement or pneumatosis intestinalis to suggest bowel ischemia. Additional work up was remarkable for leukocytosis of WBC 16k and elevated lactic initially 3.5. Pt given NS 2L bolus as well as tylenol IV and zofran. On recheck lactate had normalized after fluids. PMH for  HTN, CKD 3, prostate cancer s/p prostatectomy, HLD, OC.   Admit Inpatient level of care for SBO and Abdominal pain. WBC 16k and lactic initially 3.5 s/p IVF and normalized. NPO, advance to clear liquids as able. Continue IVFs. No surgical intervention at this time. Zofran prn for nausea. Pain control.  Hold off on NGT  tube at this time. On exam; abd tenderness and RLQ. Abd distention.  CT shows possible SBO.     Date: 3/3   Day 2:   General-Surgery cons; Enteritis versus small bowel obstruction. Abdominal pain, nausea and vomiting. Trial clear liquid diet. Monitor toleration. Continue IVFs. Continue with abdominal exams. No acute surgical intervention. Analgesia and antiemetics, as needed   CT showed concern for possible small bowel obstruction with distended fluid-filled small bowel loops     ED Triage Vitals   Temperature Pulse Respirations Blood Pressure SpO2   03/02/24 1554 03/02/24 1553 03/02/24 1553 03/02/24 1553 03/02/24 1553   98.4 °F (36.9 °C) 63 16 (!) 184/87 99 %      Temp Source Heart Rate Source Patient Position - Orthostatic VS BP Location FiO2 (%)   03/02/24 1554 03/02/24 1553 03/02/24 1553 03/02/24 1553 --   Oral Monitor Sitting Left arm       Pain Score       03/02/24 2043       No Pain          Wt Readings from Last 1 Encounters:   03/02/24 84.1 kg (185 lb 6.5 oz)     Additional Vital Signs:   3/03/24 14:44:56 98.3 °F (36.8 °C) 65 -- 132/69 90 97 % -- --   03/03/24 07:29:06 -- 65 15 109/63 78 95 % -- --   03/02/24 23:26:56 98.3 °F (36.8 °C) 59 -- 105/59 74 93 % -- --   03/02/24 20:59:03 98.5 °F (36.9 °C) 64 18 116/69 85 95 % None (Room air) Sitting   03/02/24 2043 -- -- -- -- -- -- None (Room air) --   03/02/24 1700 98.4 °F (36.9 °C) 68 20 109/59 79 92 % None (Room air) Sitting   03/02/24 1556 -- -- -- -- -- -- None (Room air)      Pertinent Labs/Diagnostic Test Results:   CT chest abdomen pelvis w contrast   Final Result by Vanessa Manuel MD (03/02 1937)      1) Several stacked, fluid-filled, and prominent loops of small bowel in the right hemiabdomen with adjacent mesenteric edema, with a focal area of luminal narrowing narrowing in one of the loops and a 2nd area of narrowing in a downstream segment,    located medially and more, and beyond which the rest of the small bowel is relatively collapsed. Overall  the findings are concerning for an early small bowel obstruction, potentially a closed-loop obstruction given the configuration and likely 2    transition points. Emergent surgical consultation is recommended.      2) No loss of bowel wall enhancement or pneumatosis intestinalis to suggest bowel ischemia at this time, however mesenteric edema may indicate early ischemia. No pneumoperitoneum      3) Patent major mesenteric arteries and veins.      4) Small abdominal ascites, mostly on the right, with some fluid also tracking into the pelvis. No organized collections to suggest an abscess.      5) Liquid stool is seen in the ascending, transverse, and proximal descending colon.      6) No acute pulmonary pathology.      7) Additional findings as above.      I personally discussed this study with JOHN AGUILLON on 3/2/2024 at  7:34 PM.                     Workstation performed: [id number]           Results from last 7 days   Lab Units 03/02/24  1617   SARS-COV-2  Negative     Results from last 7 days   Lab Units 03/03/24  0618 03/02/24  1617   WBC Thousand/uL 8.23 16.22*   HEMOGLOBIN g/dL 12.8 16.8   HEMATOCRIT % 38.2 49.3   PLATELETS Thousands/uL 242 344   NEUTROS ABS Thousands/µL 6.11  --          Results from last 7 days   Lab Units 03/03/24  0452 03/02/24  1657   SODIUM mmol/L 138 137   POTASSIUM mmol/L 3.8 4.3   CHLORIDE mmol/L 108 103   CO2 mmol/L 24 23   ANION GAP mmol/L 6 11   BUN mg/dL 17 23   CREATININE mg/dL 1.07 1.21   EGFR ml/min/1.73sq m 68 59   CALCIUM mg/dL 8.0* 8.8     Results from last 7 days   Lab Units 03/03/24  0452 03/02/24  1657   AST U/L 13 18   ALT U/L 15 21   ALK PHOS U/L 45 53   TOTAL PROTEIN g/dL 5.6* 6.8   ALBUMIN g/dL 3.4* 4.0   TOTAL BILIRUBIN mg/dL 0.65 0.59         Results from last 7 days   Lab Units 03/03/24  0452 03/02/24  1657   GLUCOSE RANDOM mg/dL 93 173*           Results from last 7 days   Lab Units 03/02/24  1853 03/02/24  1617   HS TNI 0HR ng/L  --  <2   HS TNI 2HR ng/L 2  --     HSTNI D2 ng/L >0  --          Results from last 7 days   Lab Units 03/02/24  1617   PROTIME seconds 15.6*   INR  1.18   PTT seconds 29         Results from last 7 days   Lab Units 03/02/24  1617   PROCALCITONIN ng/ml <0.05     Results from last 7 days   Lab Units 03/02/24  1852 03/02/24  1617   LACTIC ACID mmol/L 1.4 3.5*           Results from last 7 days   Lab Units 03/02/24  1852   CLARITY UA  Clear   COLOR UA  Colorless   SPEC GRAV UA  >=1.050*   PH UA  5.5   GLUCOSE UA mg/dl Negative   KETONES UA mg/dl 20 (1+)*   BLOOD UA  Negative   PROTEIN UA mg/dl Negative   NITRITE UA  Negative   BILIRUBIN UA  Negative   UROBILINOGEN UA (BE) mg/dl <2.0   LEUKOCYTES UA  Negative     Results from last 7 days   Lab Units 03/02/24  1617   INFLUENZA A PCR  Negative   INFLUENZA B PCR  Negative   RSV PCR  Negative       Results from last 7 days   Lab Units 03/02/24  1617   BLOOD CULTURE  Received in Microbiology Lab. Culture in Progress.  Received in Microbiology Lab. Culture in Progress.         ED Treatment:   Medication Administration from 03/02/2024 1550 to 03/02/2024 2011         Date/Time Order Dose Route Action     03/02/2024 1624 EST sodium chloride 0.9 % bolus 1,000 mL 1,000 mL Intravenous New Bag     03/02/2024 1618 EST ondansetron (ZOFRAN) injection 4 mg 4 mg Intravenous Given     03/02/2024 1618 EST acetaminophen (Ofirmev) injection 1,000 mg 1,000 mg Intravenous New Bag     03/02/2024 1732 EST sodium chloride 0.9 % bolus 1,000 mL 1,000 mL Intravenous New Bag     03/02/2024 1733 EST iohexol (OMNIPAQUE) 350 MG/ML injection (MULTI-DOSE) 100 mL 100 mL Intravenous Given          Past Medical History:   Diagnosis Date    Arthritis     Cancer (HCC)     prostate    Hyperlipidemia     Hypertension     OCD (obsessive compulsive disorder)      Present on Admission:   Essential hypertension   Hyperlipidemia, mixed   Prostate cancer (HCC)   Stage 3 chronic kidney disease (HCC)   Abdominal pain   SBO (small bowel obstruction)  ([facility])   OCD (obsessive compulsive disorder)      Admitting Diagnosis: SBO (small bowel obstruction) ([facility]) [K56.609]  Abdominal pain [R10.9]  Age/Sex: 73 y.o. male    Admission Orders:  Scheduled Medications:  enoxaparin, 40 mg, Subcutaneous, Daily  FLUoxetine, 40 mg, Oral, Daily  lisinopril, 10 mg, Oral, Daily  pravastatin, 80 mg, Oral, Daily With Dinner      Continuous IV Infusions:   lactated ringers infusion  Rate: 100 mL/hr Dose: 100 mL/hr  Freq: Continuous Route: IV  Last Dose: Stopped (03/03/24 0802)  Start: 03/02/24 2015 End: 03/03/24 0729     PRN Meds:  acetaminophen, 975 mg, Oral, Q8H PRN  ondansetron, 4 mg, Intravenous, Q8H PRN        IP CONSULT TO ACUTE CARE SURGERY    Network Utilization Review Department  ATTENTION: Please call with any questions or concerns to [phone] and carefully listen to the prompts so that you are directed to the right person. All voicemails are confidential.   For Discharge needs, contact Care Management DC Support Team at [phone] opt. [phone]  Send all requests for admission clinical reviews, approved or denied determinations and any other requests to dedicated fax number below belonging to the campus where the patient is receiving treatment. List of dedicated fax numbers for the Facilities:  FACILITY NAME UR FAX NUMBER   ADMISSION DENIALS (Administrative/Medical Necessity) [phone]   DISCHARGE SUPPORT TEAM (NETWORK) [phone]   PARENT CHILD HEALTH (Maternity/NICU/Pediatrics) [phone]   [facility] [phone]   [facility] [phone]   [facility] [phone]   [facility] [phone]   [facility] [phone]   [facility] [phone]   [facility] [phone]   [facility] [phone]   [facility] [phone]    [facility] [phone]   [facility] [phone]   [facility] [phone]

## 2024-03-03 NOTE — ASSESSMENT & PLAN NOTE
Lab Results   Component Value Date    EGFR 59 03/02/2024    EGFR 56 02/23/2024    EGFR 55 08/11/2023    CREATININE 1.21 03/02/2024    CREATININE 1.27 02/23/2024    CREATININE 1.28 08/11/2023   Cr at baseline   Monitor BMP  Avoid relative hypotension

## 2024-03-03 NOTE — ASSESSMENT & PLAN NOTE
74 yo M with lower abdominal pain since this morning with nausea and chills. No vomitting. Last BM this morning  CT shows possible SBO. However No loss of bowel wall enhancement or pneumatosis intestinalis to suggest bowel ischemia. WBC 16k and lactic initially 3.5 s/p IVF and normalized.   Abdominal pain significantly improved with nausea resolved. Tolerated CLD well.     Plan:  Overall clinically improved  Advance diet to surgical soft  Zofran IV as needed for n/v discontinue  General surgery consulted, no surgical intervention at this time has since signed off

## 2024-03-04 VITALS
OXYGEN SATURATION: 96 % | RESPIRATION RATE: 18 BRPM | HEART RATE: 65 BPM | BODY MASS INDEX: 27.46 KG/M2 | SYSTOLIC BLOOD PRESSURE: 134 MMHG | DIASTOLIC BLOOD PRESSURE: 72 MMHG | TEMPERATURE: 98.2 F | HEIGHT: 69 IN | WEIGHT: 185.41 LBS

## 2024-03-04 PROBLEM — I10 ESSENTIAL HYPERTENSION: Chronic | Status: ACTIVE | Noted: 2018-10-04

## 2024-03-04 LAB
ANION GAP SERPL CALCULATED.3IONS-SCNC: 4 MMOL/L
BUN SERPL-MCNC: 16 MG/DL (ref 5–25)
CALCIUM SERPL-MCNC: 8 MG/DL (ref 8.4–10.2)
CHLORIDE SERPL-SCNC: 110 MMOL/L (ref 96–108)
CO2 SERPL-SCNC: 27 MMOL/L (ref 21–32)
CREAT SERPL-MCNC: 1.08 MG/DL (ref 0.6–1.3)
GFR SERPL CREATININE-BSD FRML MDRD: 68 ML/MIN/1.73SQ M
GLUCOSE SERPL-MCNC: 89 MG/DL (ref 65–140)
MAGNESIUM SERPL-MCNC: 2.2 MG/DL (ref 1.9–2.7)
PHOSPHATE SERPL-MCNC: 2.3 MG/DL (ref 2.3–4.1)
POTASSIUM SERPL-SCNC: 4.2 MMOL/L (ref 3.5–5.3)
SODIUM SERPL-SCNC: 141 MMOL/L (ref 135–147)

## 2024-03-04 PROCEDURE — 84100 ASSAY OF PHOSPHORUS: CPT

## 2024-03-04 PROCEDURE — 80048 BASIC METABOLIC PNL TOTAL CA: CPT

## 2024-03-04 PROCEDURE — 83735 ASSAY OF MAGNESIUM: CPT

## 2024-03-04 PROCEDURE — 99239 HOSP IP/OBS DSCHRG MGMT >30: CPT | Performed by: NURSE PRACTITIONER

## 2024-03-04 RX ORDER — FLUOXETINE HYDROCHLORIDE 20 MG/1
40 CAPSULE ORAL DAILY
Start: 2024-03-04

## 2024-03-04 RX ORDER — LISINOPRIL 10 MG/1
10 TABLET ORAL DAILY
Qty: 30 TABLET | Refills: 0 | Status: SHIPPED | OUTPATIENT
Start: 2024-03-04

## 2024-03-04 RX ORDER — ROSUVASTATIN CALCIUM 10 MG/1
10 TABLET, COATED ORAL DAILY
Qty: 30 TABLET | Refills: 0 | Status: SHIPPED | OUTPATIENT
Start: 2024-03-04

## 2024-03-04 RX ADMIN — LISINOPRIL 10 MG: 10 TABLET ORAL at 09:29

## 2024-03-04 RX ADMIN — ENOXAPARIN SODIUM 40 MG: 40 INJECTION SUBCUTANEOUS at 09:29

## 2024-03-04 RX ADMIN — FLUOXETINE HYDROCHLORIDE 40 MG: 20 CAPSULE ORAL at 09:29

## 2024-03-04 NOTE — PLAN OF CARE
Problem: PAIN - ADULT  Goal: Verbalizes/displays adequate comfort level or baseline comfort level  Description: Interventions:  - Encourage patient to monitor pain and request assistance  - Assess pain using appropriate pain scale  - Administer analgesics based on type and severity of pain and evaluate response  - Implement non-pharmacological measures as appropriate and evaluate response  - Consider cultural and social influences on pain and pain management  - Notify physician/advanced practitioner if interventions unsuccessful or patient reports new pain  Outcome: Adequate for Discharge     Problem: INFECTION - ADULT  Goal: Absence or prevention of progression during hospitalization  Description: INTERVENTIONS:  - Assess and monitor for signs and symptoms of infection  - Monitor lab/diagnostic results  - Monitor all insertion sites, i.e. indwelling lines, tubes, and drains  - Monitor endotracheal if appropriate and nasal secretions for changes in amount and color  - [place] appropriate cooling/warming therapies per order  - Administer medications as ordered  - Instruct and encourage patient and family to use good hand hygiene technique  - Identify and instruct in appropriate isolation precautions for identified infection/condition  Outcome: Adequate for Discharge     Problem: SAFETY ADULT  Goal: Patient will remain free of falls  Description: INTERVENTIONS:  - Educate patient/family on patient safety including physical limitations  - Instruct patient to call for assistance with activity   - Consult OT/PT to assist with strengthening/mobility   - Keep Call bell within reach  - Keep bed low and locked with side rails adjusted as appropriate  - Keep care items and personal belongings within reach  - Initiate and maintain comfort rounds  - Make Fall Risk Sign visible to staff  - Offer Toileting every 2 Hours, in advance of need  - Apply yellow socks and bracelet for high fall risk patients  - Consider moving  patient to room near nurses station  Outcome: Adequate for Discharge  Goal: Maintain or return to baseline ADL function  Description: INTERVENTIONS:  -  Assess patient's ability to carry out ADLs; assess patient's baseline for ADL function and identify physical deficits which impact ability to perform ADLs (bathing, care of mouth/teeth, toileting, grooming, dressing, etc.)  - Assess/evaluate cause of self-care deficits   - Assess range of motion  - Assess patient's mobility; develop plan if impaired  - Assess patient's need for assistive devices and provide as appropriate  - Encourage maximum independence but intervene and supervise when necessary  - Involve family in performance of ADLs  - Assess for home care needs following discharge   - Consider OT consult to assist with ADL evaluation and planning for discharge  - Provide patient education as appropriate  Outcome: Adequate for Discharge  Goal: Maintains/Returns to pre admission functional level  Description: INTERVENTIONS:  - Perform AM-PAC 6 Click Basic Mobility/ Daily Activity assessment daily.  - Set and communicate daily mobility goal to care team and patient/family/caregiver.   - Collaborate with rehabilitation services on mobility goals if consulted  - Perform Range of Motion 4 times a day.  - Reposition patient every 2 hours.  - Dangle patient 3 times a day  - Stand patient 3 times a day  - Ambulate patient 3 times a day  - Out of bed to chair 3 times a day   - Out of bed for meals 3 times a day  - Out of bed for toileting  - Record patient progress and toleration of activity level   Outcome: Adequate for Discharge     Problem: DISCHARGE PLANNING  Goal: Discharge to home or other facility with appropriate resources  Description: INTERVENTIONS:  - Identify barriers to discharge w/patient and caregiver  - Arrange for needed discharge resources and transportation as appropriate  - Identify discharge learning needs (meds, wound care, etc.)  - Arrange for  interpretive services to assist at discharge as needed  - Refer to Case Management Department for coordinating discharge planning if the patient needs post-hospital services based on physician/advanced practitioner order or complex needs related to functional status, cognitive ability, or social support system  Outcome: Adequate for Discharge     Problem: Knowledge Deficit  Goal: Patient/family/caregiver demonstrates understanding of disease process, treatment plan, medications, and discharge instructions  Description: Complete learning assessment and assess knowledge base.  Interventions:  - Provide teaching at level of understanding  - Provide teaching via preferred learning methods  Outcome: Adequate for Discharge     Problem: Nutrition/Hydration-ADULT  Goal: Nutrient/Hydration intake appropriate for improving, restoring or maintaining nutritional needs  Description: Monitor and assess patient's nutrition/hydration status for malnutrition. Collaborate with interdisciplinary team and initiate plan and interventions as ordered.  Monitor patient's weight and dietary intake as ordered or per policy. Utilize nutrition screening tool and intervene as necessary. Determine patient's food preferences and provide high-protein, high-caloric foods as appropriate.     INTERVENTIONS:  - Monitor oral intake, urinary output, labs, and treatment plans  - Assess nutrition and hydration status and recommend course of action  - Evaluate amount of meals eaten  - Assist patient with eating if necessary   - Allow adequate time for meals  - Recommend/ encourage appropriate diets, oral nutritional supplements, and vitamin/mineral supplements  - Order, calculate, and assess calorie counts as needed  - Recommend, monitor, and adjust tube feedings and TPN/PPN based on assessed needs  - Assess need for intravenous fluids  - Provide specific nutrition/hydration education as appropriate  - Include patient/family/caregiver in decisions related  to nutrition  Outcome: Adequate for Discharge     Problem: GASTROINTESTINAL - ADULT  Goal: Minimal or absence of nausea and/or vomiting  Description: INTERVENTIONS:  - Administer IV fluids if ordered to ensure adequate hydration  - Maintain NPO status until nausea and vomiting are resolved  - Nasogastric tube if ordered  - Administer ordered antiemetic medications as needed  - Provide nonpharmacologic comfort measures as appropriate  - Advance diet as tolerated, if ordered  - Consider nutrition services referral to assist patient with adequate nutrition and appropriate food choices  Outcome: Adequate for Discharge  Goal: Maintains or returns to baseline bowel function  Description: INTERVENTIONS:  - Assess bowel function  - Encourage oral fluids to ensure adequate hydration  - Administer IV fluids if ordered to ensure adequate hydration  - Administer ordered medications as needed  - Encourage mobilization and activity  - Consider nutritional services referral to assist patient with adequate nutrition and appropriate food choices  Outcome: Adequate for Discharge

## 2024-03-04 NOTE — DISCHARGE SUMMARY
[facility]  Discharge- Zach Encarnacion 1950, 73 y.o. male MRN: [id number]  Unit/Bed#: -[facility] Encounter: [id number]  Primary Care Provider: Marciano Sultana PA-C   Date and time admitted to hospital: 3/2/2024  3:55 PM    * SBO (small bowel obstruction) (HCC)  Assessment & Plan  74 yo M with lower abdominal pain since this morning with nausea and chills. No vomitting. Last BM this morning  CT shows possible SBO. However No loss of bowel wall enhancement or pneumatosis intestinalis to suggest bowel ischemia. WBC 16k and lactic initially 3.5 s/p IVF and normalized.   Abdominal pain significantly improved with nausea resolved. Tolerated CLD well.     Plan:  Overall clinically improved  Advance diet to surgical soft  Zofran IV as needed for n/v discontinue  General surgery consulted, no surgical intervention at this time has since signed off     OCD (obsessive compulsive disorder)  Assessment & Plan  Continue Prozac    Stage 3 chronic kidney disease (HCC)  Assessment & Plan  Lab Results   Component Value Date    EGFR 68 03/04/2024    EGFR 68 03/03/2024    EGFR 59 03/02/2024    CREATININE 1.08 03/04/2024    CREATININE 1.07 03/03/2024    CREATININE 1.21 03/02/2024     Cr at baseline   Monitor BMP  Avoid relative hypotension    Hyperlipidemia, mixed  Assessment & Plan  Continue statin    Essential hypertension  Assessment & Plan  Holding home lisinopril 2/2 NPO status  Now resumed with parameters         Medical Problems       Resolved Problems  Date Reviewed: 3/2/2024   None       Discharging Physician / Practitioner: OTILIA Escobedo  PCP: Marciano Sultana PA-C  Admission Date:   Admission Orders (From admission, onward)       Ordered        03/02/24 1955  INPATIENT ADMISSION  Once                          Discharge Date: 03/04/24    Consultations During Hospital Stay:  Acute general surgery    Procedures Performed:   None    Significant Findings / Test Results:   SBO versus  "gastroenteritis    Incidental Findings:   Incidental finding of CT findings discussed with patient  I reviewed the above mentioned incidental findings with the patient and/or family and they expressed understanding.    Test Results Pending at Discharge (will require follow up):   None     Outpatient Tests Requested:  PCP    Complications: None    Reason for Admission: SBO versus gastroenteritis    Hospital Course:   Zach Encarnacion is a 73 y.o. male patient who originally presented to the hospital on 3/2/2024 due to reported lower abdominal pain since this morning nausea chills no vomiting less BM day of admission did move them again on 3/4 CT concerning findings of possible SBO patient was evaluated by surgery had minimal concern of an SBO more concern for gastroenteritis patient's symptoms overall self resolved with a little bit of bowel rest and slow diet advancement overall on day of discharge 3/4 patient was asymptomatic a bowel movement this morning and overall is tolerating his diet.  Patient will follow-up with his PCP outpatient.      Please see above list of diagnoses and related plan for additional information.     Condition at Discharge: good    Discharge Day Visit / Exam:   Subjective:    Patient denies nausea this morning overall states abdominal pain has resided and he moved his bowels and tolerated food this morning overall states he has been happy with his care and glad his bowel function has returned and the symptoms are gone.  Vitals: Blood Pressure: 134/72 (03/04/24 0928)  Pulse: 65 (03/04/24 0928)  Temperature: 98.2 °F (36.8 °C) (03/04/24 0720)  Temp Source: Oral (03/03/24 2145)  Respirations: 18 (03/04/24 0928)  Height: 5' 9\" (175.3 cm) (03/02/24 2059)  Weight - Scale: 84.1 kg (185 lb 6.5 oz) (03/02/24 2059)  SpO2: 96 % (03/04/24 0928)  Exam:   Physical Exam  Vitals and nursing note reviewed.   Constitutional:       General: He is not in acute distress.     Appearance: He is well-developed. "   HENT:      Head: Normocephalic and atraumatic.   Eyes:      Conjunctiva/sclera: Conjunctivae normal.   Cardiovascular:      Rate and Rhythm: Normal rate and regular rhythm.      Heart sounds: No murmur heard.  Pulmonary:      Effort: Pulmonary effort is normal. No respiratory distress.      Breath sounds: Normal breath sounds.   Abdominal:      Palpations: Abdomen is soft.      Tenderness: There is no abdominal tenderness.   Musculoskeletal:         General: No swelling.      Cervical back: Neck supple.   Skin:     General: Skin is warm and dry.      Capillary Refill: Capillary refill takes less than 2 seconds.   Neurological:      Mental Status: He is alert and oriented to person, place, and time.   Psychiatric:         Mood and Affect: Mood normal.          Discussion with Family: Updated  (wife) at bedside.    Discharge instructions/Information to patient and family:   See after visit summary for information provided to patient and family.      Provisions for Follow-Up Care:  See after visit summary for information related to follow-up care and any pertinent home health orders.      Mobility at time of Discharge:   Basic Mobility Inpatient Raw Score: 24  JH-HLM Goal: 8: Walk 250 feet or more  JH-HLM Achieved: 8: Walk 250 feet ot more  HLM Goal achieved. Continue to encourage appropriate mobility.     Disposition:   Home    Planned Readmission: none     Discharge Statement:  I spent 36 minutes discharging the patient. This time was spent on the day of discharge. I had direct contact with the patient on the day of discharge. Greater than 50% of the total time was spent examining patient, answering all patient questions, arranging and discussing plan of care with patient as well as directly providing post-discharge instructions.  Additional time then spent on discharge activities.    Discharge Medications:  See after visit summary for reconciled discharge medications provided to patient and/or  family.      **Please Note: This note may have been constructed using a voice recognition system**

## 2024-03-04 NOTE — PLAN OF CARE
Problem: INFECTION - ADULT  Goal: Absence or prevention of progression during hospitalization  Description: INTERVENTIONS:  - Assess and monitor for signs and symptoms of infection  - Monitor lab/diagnostic results  - Monitor all insertion sites, i.e. indwelling lines, tubes, and drains  - Monitor endotracheal if appropriate and nasal secretions for changes in amount and color  - [place]us[place] appropriate cooling/warming therapies per order  - Administer medications as ordered  - Instruct and encourage patient and family to use good hand hygiene technique  - Identify and instruct in appropriate isolation precautions for identified infection/condition  Outcome: Progressing     Problem: Nutrition/Hydration-ADULT  Goal: Nutrient/Hydration intake appropriate for improving, restoring or maintaining nutritional needs  Description: Monitor and assess patient's nutrition/hydration status for malnutrition. Collaborate with interdisciplinary team and initiate plan and interventions as ordered.  Monitor patient's weight and dietary intake as ordered or per policy. Utilize nutrition screening tool and intervene as necessary. Determine patient's food preferences and provide high-protein, high-caloric foods as appropriate.     INTERVENTIONS:  - Monitor oral intake, urinary output, labs, and treatment plans  - Assess nutrition and hydration status and recommend course of action  - Evaluate amount of meals eaten  - Assist patient with eating if necessary   - Allow adequate time for meals  - Recommend/ encourage appropriate diets, oral nutritional supplements, and vitamin/mineral supplements  - Order, calculate, and assess calorie counts as needed  - Recommend, monitor, and adjust tube feedings and TPN/PPN based on assessed needs  - Assess need for intravenous fluids  - Provide specific nutrition/hydration education as appropriate  - Include patient/family/caregiver in decisions related to nutrition  Outcome: Progressing     Problem:  GASTROINTESTINAL - ADULT  Goal: Minimal or absence of nausea and/or vomiting  Description: INTERVENTIONS:  - Administer IV fluids if ordered to ensure adequate hydration  - Maintain NPO status until nausea and vomiting are resolved  - Nasogastric tube if ordered  - Administer ordered antiemetic medications as needed  - Provide nonpharmacologic comfort measures as appropriate  - Advance diet as tolerated, if ordered  - Consider nutrition services referral to assist patient with adequate nutrition and appropriate food choices  Outcome: Progressing

## 2024-03-04 NOTE — PROGRESS NOTES
Spiritual Care Progress Note    3/4/2024  Patient: Zach Encarnacion : 1950  Admission Date & Time: 3/2/2024 1555  MRN: [id number] CSN: [id number]    Eucharistic  provided prayer.     24 1000   Clinical Encounter Type   Visited With Patient   Routine Visit Introduction   Referral From Other (Comment)  (Islam census)   Orthodoxy Encounters   Orthodoxy Needs Prayer

## 2024-03-04 NOTE — INCIDENTAL FINDINGS
The following findings require follow up:  Radiographic finding   Finding:  Several stacked, fluid-filled, and prominent loops of small bowel in the right hemiabdomen with adjacent mesenteric edema, with a focal area of luminal narrowing narrowing in one of the loops and a 2nd area of narrowing in a downstream segment,   located medially and more, and beyond which the rest of the small bowel is relatively collapsed. Overall the findings are concerning for an early small bowel obstruction, potentially a closed-loop obstruction given the configuration and likely 2   transition points. Emergent surgical consultation is recommended.     2) No loss of bowel wall enhancement or pneumatosis intestinalis to suggest bowel ischemia at this time, however mesenteric edema may indicate early ischemia. No pneumoperitoneum   Follow up required: yes   Follow up should be done within 1-2 week(s)    Please notify the following clinician to assist with the follow up:   Marciano Sultana PA-C

## 2024-03-04 NOTE — UTILIZATION REVIEW
Continued Stay Review    Date: 3/4                          Current Patient Class: IP   Current Level of Care: MS    HPI:73 y.o. male initially admitted on  3/2    Assessment/Plan:     Vital Signs:   03/04/24 0938 -- -- -- -- -- -- None (Room air) --   03/04/24 09:28:23 -- 65 -- 134/72 93 96 % -- --   03/04/24 07:20:59 98.2 °F (36.8 °C) 56 18 111/69 83 93 % --        Pertinent Labs/Diagnostic Results:   Results from last 7 days   Lab Units 03/02/24  1617   SARS-COV-2  Negative     Results from last 7 days   Lab Units 03/03/24  0618 03/02/24  1617   WBC Thousand/uL 8.23 16.22*   HEMOGLOBIN g/dL 12.8 16.8   HEMATOCRIT % 38.2 49.3   PLATELETS Thousands/uL 242 344   NEUTROS ABS Thousands/µL 6.11  --          Results from last 7 days   Lab Units 03/04/24  0523 03/03/24 0452 03/02/24  1657   SODIUM mmol/L 141 138 137   POTASSIUM mmol/L 4.2 3.8 4.3   CHLORIDE mmol/L 110* 108 103   CO2 mmol/L 27 24 23   ANION GAP mmol/L 4 6 11   BUN mg/dL 16 17 23   CREATININE mg/dL 1.08 1.07 1.21   EGFR ml/min/1.73sq m 68 68 59   CALCIUM mg/dL 8.0* 8.0* 8.8   MAGNESIUM mg/dL 2.2  --   --    PHOSPHORUS mg/dL 2.3  --   --      Results from last 7 days   Lab Units 03/03/24  0452 03/02/24  1657   AST U/L 13 18   ALT U/L 15 21   ALK PHOS U/L 45 53   TOTAL PROTEIN g/dL 5.6* 6.8   ALBUMIN g/dL 3.4* 4.0   TOTAL BILIRUBIN mg/dL 0.65 0.59         Results from last 7 days   Lab Units 03/04/24  0523 03/03/24  0452 03/02/24  1657   GLUCOSE RANDOM mg/dL 89 93 173*     Results from last 7 days   Lab Units 03/02/24  1853 03/02/24  1617   HS TNI 0HR ng/L  --  <2   HS TNI 2HR ng/L 2  --    HSTNI D2 ng/L >0  --          Results from last 7 days   Lab Units 03/02/24  1617   PROTIME seconds 15.6*   INR  1.18   PTT seconds 29         Results from last 7 days   Lab Units 03/02/24  1617   PROCALCITONIN ng/ml <0.05     Results from last 7 days   Lab Units 03/02/24  1852 03/02/24  1617   LACTIC ACID mmol/L 1.4 3.5*         Results from last 7 days   Lab Units  03/02/24  1852   CLARITY UA  Clear   COLOR UA  Colorless   SPEC GRAV UA  >=1.050*   PH UA  5.5   GLUCOSE UA mg/dl Negative   KETONES UA mg/dl 20 (1+)*   BLOOD UA  Negative   PROTEIN UA mg/dl Negative   NITRITE UA  Negative   BILIRUBIN UA  Negative   UROBILINOGEN UA (BE) mg/dl <2.0   LEUKOCYTES UA  Negative     Results from last 7 days   Lab Units 03/02/24  1617   INFLUENZA A PCR  Negative   INFLUENZA B PCR  Negative   RSV PCR  Negative         Results from last 7 days   Lab Units 03/02/24  1617   BLOOD CULTURE  No Growth at 24 hrs.  No Growth at 24 hrs.           Medications:   Scheduled Medications:  enoxaparin, 40 mg, Subcutaneous, Daily  FLUoxetine, 40 mg, Oral, Daily  lisinopril, 10 mg, Oral, Daily  pravastatin, 80 mg, Oral, Daily With Dinner      Continuous IV Infusions:     PRN Meds:  acetaminophen, 975 mg, Oral, Q8H PRN  ondansetron, 4 mg, Intravenous, Q8H PRN        Discharge Plan: TBD     Network Utilization Review Department  ATTENTION: Please call with any questions or concerns to [phone] and carefully listen to the prompts so that you are directed to the right person. All voicemails are confidential.   For Discharge needs, contact Care Management DC Support Team at [phone] opt. 2  Send all requests for admission clinical reviews, approved or denied determinations and any other requests to dedicated fax number below belonging to the campus where the patient is receiving treatment. List of dedicated fax numbers for the Facilities:  FACILITY NAME UR FAX NUMBER   ADMISSION DENIALS (Administrative/Medical Necessity) [phone]   DISCHARGE SUPPORT TEAM (NETWORK) [phone]   PARENT CHILD HEALTH (Maternity/NICU/Pediatrics) [phone]   [facility] [phone]   General acute hospital [phone]   [facility] [phone]   [facility] [phone]   [facility] [phone]    [facility] [phone]   [facility] [phone]   [facility] [phone]   [facility] [phone]   [facility] [phone]   [facility] [phone]   [facility] [phone]

## 2024-03-05 ENCOUNTER — TRANSITIONAL CARE MANAGEMENT (OUTPATIENT)
Dept: FAMILY MEDICINE CLINIC | Facility: CLINIC | Age: 74
End: 2024-03-05

## 2024-03-05 NOTE — UTILIZATION REVIEW
NOTIFICATION OF ADMISSION DISCHARGE   This is a Notification of Discharge from [facility]. Please be advised that this patient has been discharge from our facility. Below you will find the admission and discharge date and time including the patient’s disposition.   UTILIZATION REVIEW CONTACT:  Venus Evans  Utilization   Network Utilization Review Department  Phone: [phone] x carefully listen to the prompts. All voicemails are confidential.  Email: [email or web address]     ADMISSION INFORMATION  PRESENTATION DATE: 3/2/2024  3:55 PM  OBERVATION ADMISSION DATE:   INPATIENT ADMISSION DATE: 3/2/24  7:55 PM   DISCHARGE DATE: 3/4/2024  2:15 PM   DISPOSITION:Home/Self Care    Network Utilization Review Department  ATTENTION: Please call with any questions or concerns to [phone] and carefully listen to the prompts so that you are directed to the right person. All voicemails are confidential.   For Discharge needs, contact Care Management DC Support Team at [phone] opt. 2  Send all requests for admission clinical reviews, approved or denied determinations and any other requests to dedicated fax number below belonging to the campus where the patient is receiving treatment. List of dedicated fax numbers for the Facilities:  FACILITY NAME UR FAX NUMBER   ADMISSION DENIALS (Administrative/Medical Necessity) [phone]   DISCHARGE SUPPORT TEAM ([facility]) [phone]   PARENT CHILD HEALTH (Maternity/NICU/Pediatrics) [phone]   [facility] [phone]   [facility] [phone]   [facility] [phone]   [facility] [phone]   [facility] [phone]   [facility] [phone]   [facility] [phone]   [facility] [phone]    [facility] [phone]   [facility] [phone]   [facility] [phone]   [facility] [phone]

## 2024-03-08 LAB
BACTERIA BLD CULT: NORMAL
BACTERIA BLD CULT: NORMAL

## 2024-03-11 ENCOUNTER — OFFICE VISIT (OUTPATIENT)
Dept: FAMILY MEDICINE CLINIC | Facility: CLINIC | Age: 74
End: 2024-03-11
Payer: COMMERCIAL

## 2024-03-11 VITALS
HEART RATE: 60 BPM | BODY MASS INDEX: 27.11 KG/M2 | DIASTOLIC BLOOD PRESSURE: 76 MMHG | HEIGHT: 69 IN | OXYGEN SATURATION: 98 % | SYSTOLIC BLOOD PRESSURE: 116 MMHG | WEIGHT: 183 LBS | TEMPERATURE: 96.8 F

## 2024-03-11 DIAGNOSIS — K56.609 SBO (SMALL BOWEL OBSTRUCTION) (HCC): ICD-10-CM

## 2024-03-11 DIAGNOSIS — E83.51 HYPOCALCEMIA: ICD-10-CM

## 2024-03-11 DIAGNOSIS — Z76.89 ENCOUNTER FOR SUPPORT AND COORDINATION OF TRANSITION OF CARE: Primary | ICD-10-CM

## 2024-03-11 DIAGNOSIS — N18.30 STAGE 3 CHRONIC KIDNEY DISEASE, UNSPECIFIED WHETHER STAGE 3A OR 3B CKD (HCC): ICD-10-CM

## 2024-03-11 DIAGNOSIS — I10 ESSENTIAL HYPERTENSION: ICD-10-CM

## 2024-03-11 PROCEDURE — 99495 TRANSJ CARE MGMT MOD F2F 14D: CPT

## 2024-03-11 NOTE — PROGRESS NOTES
Assessment & Plan     1. Encounter for support and coordination of transition of care    2. SBO (small bowel obstruction) (HCC)    3. Hypocalcemia  -     Comprehensive metabolic panel; Future    4. Essential hypertension  Assessment & Plan:  - patient restarted on lisinopril 10 mg QD on discharge   - BP today WNL at 116/76 mmhg       5. Stage 3 chronic kidney disease, unspecified whether stage 3a or 3b CKD (HCC)  Assessment & Plan:  Lab Results   Component Value Date    EGFR 68 03/04/2024    EGFR 68 03/03/2024    EGFR 59 03/02/2024    CREATININE 1.08 03/04/2024    CREATININE 1.07 03/03/2024    CREATININE 1.21 03/02/2024     - BMP on discharge showed kidney function to be back around patient's baseline   - advised to stay hydrated       Patient presents for TCM after being admitted to hospital from 3/2-3/4/24 for SBO. Patient's symptoms resolved completely with bowel rest and slow dietary advancement. Per discharge note; general surgery more concerned for gastroenteritis than SBO; did not see a need to follow up with GI/general surgery outpatient as symptoms completely resolved on discharge.     Patient initially had elevated WBC and lactic acid, however repeat testing on day of discharge showed WBC and lactic acid back WNL.   Last BMP showed hypocalcemia at 8.0 - therefore ordered repeat CMP to make sure it is trending back within normal range     Otherwise patient doing much better; symptoms remain resolved. Eating normal diet without issue and having normal bowel movements. No interest in following with GI. Will continue to monitor for symptoms. ER return precautions discussed at today's visit. To call with any questions/concerns.      Subjective     Transitional Care Management Review:   Zach Encarnacion is a 73 y.o. male here for TCM follow up.     During the TCM phone call patient stated:  TCM Call       Date and time call was made  3/6/2024 10:18 AM    Hospital care reviewed  Records reviewed    Patient was  "hospitialized at  [facility]    Date of Admission  03/02/24    Date of discharge  03/04/24    Diagnosis  SBO    Disposition  Home    Current Symptoms  None          TCM Call       Post hospital issues  None    Should patient be enrolled in anticoag monitoring?  No    Scheduled for follow up?  Yes    Not clinically warranted  Patient is following up with Urologist and does not need to be seen @ our Practice.    I have advised the patient to call PCP with any new or worsening symptoms  SARIKA KAY          CC: TCM     Patient presents for TCM after being admitted to the hospital from 3/2-3/4/24 for potential SBO. Patient initially presented to the ED on 3/2 for lower abdominal pain. CT abdomen completed which showed findings concerning for SBO; therefore patient was admitted for further evaluation. Per hospital discharge note on 3/4/24, hospital course below:   \"Zach Encarnacion is a 73 y.o. male patient who originally presented to the hospital on 3/2/2024 due to reported lower abdominal pain since this morning nausea chills no vomiting less BM day of admission did move them again on 3/4 CT concerning findings of possible SBO patient was evaluated by surgery had minimal concern of an SBO more concern for gastroenteritis patient's symptoms overall self resolved with a little bit of bowel rest and slow diet advancement overall on day of discharge 3/4 patient was asymptomatic a bowel movement this morning and overall is tolerating his diet.  Patient will follow-up with his PCP outpatient.\"    Patient presents today noting he feels great and back to his normal. No longer having any abdominal pain. Back to normal daily bowel movements. Eating his normal diet and tolerating it well without any issues; no N/V. Patient not interested in seeing GI outpatient at this time but will let me know if he changes his mind. Repeat CBC completed the day of discharge which showed his WBC back WNL.       Review of Systems " "  Constitutional:  Negative for appetite change, chills, diaphoresis and fever.   Respiratory:  Negative for cough, chest tightness, shortness of breath and wheezing.    Cardiovascular:  Negative for chest pain and palpitations.   Gastrointestinal:  Negative for abdominal pain, blood in stool, constipation, diarrhea, nausea and vomiting.   Genitourinary:  Negative for difficulty urinating.   Neurological:  Negative for dizziness, light-headedness and headaches.       Objective     /76   Pulse 60   Temp (!) 96.8 °F (36 °C)   Ht 5' 9\" (1.753 m)   Wt 83 kg (183 lb)   SpO2 98%   BMI 27.02 kg/m²      Physical Exam  Vitals reviewed.   Constitutional:       General: He is not in acute distress.     Appearance: Normal appearance. He is not ill-appearing or diaphoretic.   HENT:      Head: Normocephalic and atraumatic.      Right Ear: External ear normal.      Left Ear: External ear normal.      Nose: Nose normal.      Mouth/Throat:      Mouth: Mucous membranes are moist.   Eyes:      General:         Right eye: No discharge.         Left eye: No discharge.      Conjunctiva/sclera: Conjunctivae normal.   Cardiovascular:      Rate and Rhythm: Normal rate and regular rhythm.      Pulses: Normal pulses.      Heart sounds: Normal heart sounds. No murmur heard.  Pulmonary:      Effort: Pulmonary effort is normal. No respiratory distress.      Breath sounds: Normal breath sounds. No wheezing, rhonchi or rales.   Abdominal:      General: Bowel sounds are normal. There is no distension.      Palpations: Abdomen is soft. There is no mass.      Tenderness: There is no abdominal tenderness. There is no guarding or rebound.      Hernia: No hernia is present.   Musculoskeletal:         General: Normal range of motion.      Cervical back: Normal range of motion.      Right lower leg: No edema.      Left lower leg: No edema.   Skin:     General: Skin is warm.   Neurological:      General: No focal deficit present.      Mental " Status: He is alert.      Gait: Gait normal.   Psychiatric:         Mood and Affect: Mood normal.       Medications have been reviewed by provider in current encounter    Gracia Naik PA-C

## 2024-03-12 ENCOUNTER — APPOINTMENT (OUTPATIENT)
Dept: LAB | Facility: CLINIC | Age: 74
End: 2024-03-12
Payer: COMMERCIAL

## 2024-03-12 DIAGNOSIS — E83.51 HYPOCALCEMIA: ICD-10-CM

## 2024-03-12 LAB
ALBUMIN SERPL BCP-MCNC: 4.2 G/DL (ref 3.5–5)
ALP SERPL-CCNC: 62 U/L (ref 34–104)
ALT SERPL W P-5'-P-CCNC: 22 U/L (ref 7–52)
ANION GAP SERPL CALCULATED.3IONS-SCNC: 7 MMOL/L (ref 4–13)
AST SERPL W P-5'-P-CCNC: 13 U/L (ref 13–39)
BILIRUB SERPL-MCNC: 0.62 MG/DL (ref 0.2–1)
BUN SERPL-MCNC: 22 MG/DL (ref 5–25)
CALCIUM SERPL-MCNC: 9.3 MG/DL (ref 8.4–10.2)
CHLORIDE SERPL-SCNC: 104 MMOL/L (ref 96–108)
CO2 SERPL-SCNC: 30 MMOL/L (ref 21–32)
CREAT SERPL-MCNC: 1.21 MG/DL (ref 0.6–1.3)
GFR SERPL CREATININE-BSD FRML MDRD: 59 ML/MIN/1.73SQ M
GLUCOSE P FAST SERPL-MCNC: 95 MG/DL (ref 65–99)
POTASSIUM SERPL-SCNC: 4.9 MMOL/L (ref 3.5–5.3)
PROT SERPL-MCNC: 7.1 G/DL (ref 6.4–8.4)
SODIUM SERPL-SCNC: 141 MMOL/L (ref 135–147)

## 2024-03-12 PROCEDURE — 36415 COLL VENOUS BLD VENIPUNCTURE: CPT

## 2024-03-12 PROCEDURE — 80053 COMPREHEN METABOLIC PANEL: CPT

## 2024-03-25 DIAGNOSIS — F42.8 OTHER OBSESSIVE-COMPULSIVE DISORDERS: ICD-10-CM

## 2024-03-26 RX ORDER — FLUOXETINE HYDROCHLORIDE 20 MG/1
20 CAPSULE ORAL 2 TIMES DAILY
Qty: 180 CAPSULE | Refills: 0 | Status: SHIPPED | OUTPATIENT
Start: 2024-03-26

## 2024-06-20 DIAGNOSIS — F42.8 OTHER OBSESSIVE-COMPULSIVE DISORDERS: ICD-10-CM

## 2024-06-20 RX ORDER — FLUOXETINE HYDROCHLORIDE 20 MG/1
20 CAPSULE ORAL 2 TIMES DAILY
Qty: 180 CAPSULE | Refills: 0 | Status: SHIPPED | OUTPATIENT
Start: 2024-06-20

## 2024-06-22 DIAGNOSIS — I10 ESSENTIAL HYPERTENSION: ICD-10-CM

## 2024-06-22 RX ORDER — LISINOPRIL 10 MG/1
10 TABLET ORAL DAILY
Qty: 90 TABLET | Refills: 0 | Status: SHIPPED | OUTPATIENT
Start: 2024-06-22

## 2024-08-09 ENCOUNTER — RA CDI HCC (OUTPATIENT)
Dept: OTHER | Facility: HOSPITAL | Age: 74
End: 2024-08-09

## 2024-09-13 DIAGNOSIS — F42.8 OTHER OBSESSIVE-COMPULSIVE DISORDERS: ICD-10-CM

## 2024-09-15 DIAGNOSIS — I10 ESSENTIAL HYPERTENSION: ICD-10-CM

## 2024-09-16 ENCOUNTER — TELEPHONE (OUTPATIENT)
Age: 74
End: 2024-09-16

## 2024-09-16 DIAGNOSIS — N18.30 STAGE 3 CHRONIC KIDNEY DISEASE, UNSPECIFIED WHETHER STAGE 3A OR 3B CKD (HCC): ICD-10-CM

## 2024-09-16 DIAGNOSIS — Z12.5 ENCOUNTER FOR SCREENING FOR MALIGNANT NEOPLASM OF PROSTATE: ICD-10-CM

## 2024-09-16 DIAGNOSIS — E78.2 HYPERLIPIDEMIA, MIXED: ICD-10-CM

## 2024-09-16 DIAGNOSIS — I10 ESSENTIAL HYPERTENSION: Primary | ICD-10-CM

## 2024-09-16 DIAGNOSIS — C61 PROSTATE CANCER (HCC): ICD-10-CM

## 2024-09-16 NOTE — TELEPHONE ENCOUNTER
Patient scheduled a follow up appt with Marciano Sultana on 9/26.  Please add lab orders for him and call him to let him know when they are in so he can get to the lab.  Thank you.

## 2024-09-17 RX ORDER — LISINOPRIL 10 MG/1
10 TABLET ORAL DAILY
Qty: 90 TABLET | Refills: 0 | Status: SHIPPED | OUTPATIENT
Start: 2024-09-17 | End: 2024-09-26 | Stop reason: SDUPTHER

## 2024-09-24 ENCOUNTER — APPOINTMENT (OUTPATIENT)
Dept: LAB | Facility: CLINIC | Age: 74
End: 2024-09-24
Payer: COMMERCIAL

## 2024-09-24 DIAGNOSIS — Z12.5 ENCOUNTER FOR SCREENING FOR MALIGNANT NEOPLASM OF PROSTATE: ICD-10-CM

## 2024-09-24 DIAGNOSIS — I10 ESSENTIAL HYPERTENSION: ICD-10-CM

## 2024-09-24 DIAGNOSIS — N18.30 STAGE 3 CHRONIC KIDNEY DISEASE, UNSPECIFIED WHETHER STAGE 3A OR 3B CKD (HCC): ICD-10-CM

## 2024-09-24 DIAGNOSIS — C61 PROSTATE CANCER (HCC): ICD-10-CM

## 2024-09-24 DIAGNOSIS — E78.2 HYPERLIPIDEMIA, MIXED: ICD-10-CM

## 2024-09-24 LAB
ALBUMIN SERPL BCG-MCNC: 4.5 G/DL (ref 3.5–5)
ALP SERPL-CCNC: 60 U/L (ref 34–104)
ALT SERPL W P-5'-P-CCNC: 16 U/L (ref 7–52)
ANION GAP SERPL CALCULATED.3IONS-SCNC: 10 MMOL/L (ref 4–13)
AST SERPL W P-5'-P-CCNC: 17 U/L (ref 13–39)
BASOPHILS # BLD AUTO: 0.03 THOUSANDS/ΜL (ref 0–0.1)
BASOPHILS NFR BLD AUTO: 1 % (ref 0–1)
BILIRUB SERPL-MCNC: 0.63 MG/DL (ref 0.2–1)
BUN SERPL-MCNC: 28 MG/DL (ref 5–25)
CALCIUM SERPL-MCNC: 9.1 MG/DL (ref 8.4–10.2)
CHLORIDE SERPL-SCNC: 100 MMOL/L (ref 96–108)
CHOLEST SERPL-MCNC: 193 MG/DL
CO2 SERPL-SCNC: 27 MMOL/L (ref 21–32)
CREAT SERPL-MCNC: 1.18 MG/DL (ref 0.6–1.3)
EOSINOPHIL # BLD AUTO: 0.1 THOUSAND/ΜL (ref 0–0.61)
EOSINOPHIL NFR BLD AUTO: 2 % (ref 0–6)
ERYTHROCYTE [DISTWIDTH] IN BLOOD BY AUTOMATED COUNT: 11.9 % (ref 11.6–15.1)
GFR SERPL CREATININE-BSD FRML MDRD: 61 ML/MIN/1.73SQ M
GLUCOSE P FAST SERPL-MCNC: 96 MG/DL (ref 65–99)
HCT VFR BLD AUTO: 45.7 % (ref 36.5–49.3)
HDLC SERPL-MCNC: 37 MG/DL
HGB BLD-MCNC: 15.4 G/DL (ref 12–17)
IMM GRANULOCYTES # BLD AUTO: 0.07 THOUSAND/UL (ref 0–0.2)
IMM GRANULOCYTES NFR BLD AUTO: 1 % (ref 0–2)
LDLC SERPL CALC-MCNC: 130 MG/DL (ref 0–100)
LYMPHOCYTES # BLD AUTO: 1.58 THOUSANDS/ΜL (ref 0.6–4.47)
LYMPHOCYTES NFR BLD AUTO: 26 % (ref 14–44)
MCH RBC QN AUTO: 30.1 PG (ref 26.8–34.3)
MCHC RBC AUTO-ENTMCNC: 33.7 G/DL (ref 31.4–37.4)
MCV RBC AUTO: 89 FL (ref 82–98)
MONOCYTES # BLD AUTO: 0.73 THOUSAND/ΜL (ref 0.17–1.22)
MONOCYTES NFR BLD AUTO: 12 % (ref 4–12)
NEUTROPHILS # BLD AUTO: 3.65 THOUSANDS/ΜL (ref 1.85–7.62)
NEUTS SEG NFR BLD AUTO: 58 % (ref 43–75)
NRBC BLD AUTO-RTO: 0 /100 WBCS
PLATELET # BLD AUTO: 319 THOUSANDS/UL (ref 149–390)
PMV BLD AUTO: 9 FL (ref 8.9–12.7)
POTASSIUM SERPL-SCNC: 4.6 MMOL/L (ref 3.5–5.3)
PROT SERPL-MCNC: 7.4 G/DL (ref 6.4–8.4)
PSA SERPL-MCNC: <0.008 NG/ML (ref 0–4)
RBC # BLD AUTO: 5.12 MILLION/UL (ref 3.88–5.62)
SODIUM SERPL-SCNC: 137 MMOL/L (ref 135–147)
TRIGL SERPL-MCNC: 129 MG/DL
WBC # BLD AUTO: 6.16 THOUSAND/UL (ref 4.31–10.16)

## 2024-09-24 PROCEDURE — 36415 COLL VENOUS BLD VENIPUNCTURE: CPT

## 2024-09-24 PROCEDURE — 80053 COMPREHEN METABOLIC PANEL: CPT

## 2024-09-24 PROCEDURE — 85025 COMPLETE CBC W/AUTO DIFF WBC: CPT

## 2024-09-24 PROCEDURE — G0103 PSA SCREENING: HCPCS

## 2024-09-24 PROCEDURE — 80061 LIPID PANEL: CPT

## 2024-09-26 ENCOUNTER — OFFICE VISIT (OUTPATIENT)
Dept: FAMILY MEDICINE CLINIC | Facility: CLINIC | Age: 74
End: 2024-09-26
Payer: COMMERCIAL

## 2024-09-26 VITALS
TEMPERATURE: 98.7 F | SYSTOLIC BLOOD PRESSURE: 134 MMHG | OXYGEN SATURATION: 97 % | WEIGHT: 187 LBS | HEIGHT: 69 IN | HEART RATE: 70 BPM | DIASTOLIC BLOOD PRESSURE: 80 MMHG | BODY MASS INDEX: 27.7 KG/M2

## 2024-09-26 DIAGNOSIS — N18.30 STAGE 3 CHRONIC KIDNEY DISEASE, UNSPECIFIED WHETHER STAGE 3A OR 3B CKD (HCC): ICD-10-CM

## 2024-09-26 DIAGNOSIS — F42.8 OTHER OBSESSIVE-COMPULSIVE DISORDERS: ICD-10-CM

## 2024-09-26 DIAGNOSIS — I10 ESSENTIAL HYPERTENSION: Primary | ICD-10-CM

## 2024-09-26 DIAGNOSIS — F42.9 OBSESSIVE-COMPULSIVE DISORDER, UNSPECIFIED TYPE: ICD-10-CM

## 2024-09-26 DIAGNOSIS — E78.2 HYPERLIPIDEMIA, MIXED: ICD-10-CM

## 2024-09-26 DIAGNOSIS — K56.609 SBO (SMALL BOWEL OBSTRUCTION) (HCC): ICD-10-CM

## 2024-09-26 PROCEDURE — G2211 COMPLEX E/M VISIT ADD ON: HCPCS | Performed by: PHYSICIAN ASSISTANT

## 2024-09-26 PROCEDURE — 99214 OFFICE O/P EST MOD 30 MIN: CPT | Performed by: PHYSICIAN ASSISTANT

## 2024-09-26 RX ORDER — ROSUVASTATIN CALCIUM 10 MG/1
10 TABLET, COATED ORAL DAILY
Qty: 90 TABLET | Refills: 1 | Status: SHIPPED | OUTPATIENT
Start: 2024-09-26 | End: 2025-03-25

## 2024-09-26 RX ORDER — LISINOPRIL 10 MG/1
10 TABLET ORAL DAILY
Qty: 90 TABLET | Refills: 1 | Status: SHIPPED | OUTPATIENT
Start: 2024-09-26 | End: 2025-03-25

## 2024-09-26 NOTE — PROGRESS NOTES
Ambulatory Visit  Name: Zach Encarnacion      : 1950      MRN: [id number]  Encounter Provider: Marciano Sulatna PA-C  Encounter Date: 2024   Encounter department: [facility]    Assessment & Plan  Essential hypertension  BP remains well controlled, continue lisinopril        SBO (small bowel obstruction) (HCC)  No recurrence of sx       Stage 3 chronic kidney disease, unspecified whether stage 3a or 3b CKD (HCC)  Lab Results   Component Value Date    EGFR 61 2024    EGFR 59 2024    EGFR 68 2024    CREATININE 1.18 2024    CREATININE 1.21 2024    CREATININE 1.08 2024   Last GFR 61, avoid nsaids, stay hydrated         Obsessive-compulsive disorder, unspecified type  Continue prozac       Hyperlipidemia, mixed  LDL increased, recheck lipids in 6 months and continue statin            History of Present Illness     Pt presents for follow up. No acute concerns today. He overall is feeling well.      HTN: well controlled on lisinopril, 134/80, no end organ complaints.   HLD: +statin, lipids increased a bit  OCD: controlled on prozac BID  Hx of prostate cancer, hx of prostatectomy, PSA being followed, remained undetectable last check  In the interim pt did have SBO which resolved spontaneously without recurrence of sx    Recent Results (from the past 672 hour(s))  -Comprehensive metabolic panel:   Collection Time: 24  7:12 AM       Result                      Value             Ref Range           Sodium                      137               135 - 147 mm*       Potassium                   4.6               3.5 - 5.3 mm*       Chloride                    100               96 - 108 mmo*       CO2                         27                21 - 32 mmol*       ANION GAP                   10                4 - 13 mmol/L       BUN                         28 (H)            5 - 25 mg/dL        Creatinine                  1.18              0.60 - 1.30 *        Glucose, Fasting            96                65 - 99 mg/dL       Calcium                     9.1               8.4 - 10.2 m*       AST                         17                13 - 39 U/L         ALT                         16                7 - 52 U/L          Alkaline Phosphatase        60                34 - 104 U/L        Total Protein               7.4               6.4 - 8.4 g/*       Albumin                     4.5               3.5 - 5.0 g/*       Total Bilirubin             0.63              0.20 - 1.00 *       eGFR                        61                ml/min/1.73s*  -Lipid Panel with Direct LDL reflex:   Collection Time: 09/24/24  7:12 AM       Result                      Value             Ref Range           Cholesterol                 193               See Comment *       Triglycerides               129               See Comment *       HDL, Direct                 37 (L)            >=40 mg/dL          LDL Calculated              130 (H)           0 - 100 mg/dL  -PSA, Total Screen:   Collection Time: 09/24/24  7:12 AM       Result                      Value             Ref Range           PSA                         <0.008            0.000 - 4.00*  -CBC and differential:   Collection Time: 09/24/24  7:12 AM       Result                      Value             Ref Range           WBC                         6.16              4.31 - 10.16*       RBC                         5.12              3.88 - 5.62 *       Hemoglobin                  15.4              12.0 - 17.0 *       Hematocrit                  45.7              36.5 - 49.3 %       MCV                         89                82 - 98 fL          MCH                         30.1              26.8 - 34.3 *       MCHC                        33.7              31.4 - 37.4 *       RDW                         11.9              11.6 - 15.1 %       MPV                         9.0               8.9 - 12.7 fL       Platelets                   319                149 - 390 Th*       nRBC                        0                 /100 WBCs           Segmented %                 58                43 - 75 %           Immature Grans %            1                 0 - 2 %             Lymphocytes %               26                14 - 44 %           Monocytes %                 12                4 - 12 %            Eosinophils Relative        2                 0 - 6 %             Basophils Relative          1                 0 - 1 %             Absolute Neutrophils        3.65              1.85 - 7.62 *       Absolute Immature Grans     0.07              0.00 - 0.20 *       Absolute Lymphocytes        1.58              0.60 - 4.47 *       Absolute Monocytes          0.73              0.17 - 1.22 *       Eosinophils Absolute        0.10              0.00 - 0.61 *       Basophils Absolute          0.03              0.00 - 0.10 *        Review of Systems   Constitutional:  Negative for chills, fatigue and fever.   HENT:  Negative for congestion, ear pain, hearing loss, nosebleeds, postnasal drip, rhinorrhea, sinus pressure, sinus pain, sneezing and sore throat.    Eyes:  Negative for pain, discharge, itching and visual disturbance.   Respiratory:  Negative for cough, chest tightness, shortness of breath and wheezing.    Cardiovascular:  Negative for chest pain, palpitations and leg swelling.   Gastrointestinal:  Negative for abdominal pain, blood in stool, constipation, diarrhea, nausea and vomiting.   Genitourinary:  Negative for frequency and urgency.   Musculoskeletal: Negative.    Neurological:  Negative for dizziness, light-headedness and numbness.   Psychiatric/Behavioral: Negative.       Past Medical History:   Diagnosis Date    Arthritis     Cancer (HCC)     prostate    Hyperlipidemia     Hypertension     OCD (obsessive compulsive disorder)      Past Surgical History:   Procedure Laterality Date    FINGER AMPUTATION Left     partial middle and ring    FL RETROGRADE  PYELOGRAM  04/05/2019    FL RETROGRADE PYELOGRAM  05/09/2019    KNEE ARTHROSCOPY Right     acl    KNEE SURGERY Right     menisectomy    HI CYSTO/URETERO W/LITHOTRIPSY &INDWELL STENT INSRT Left 04/05/2019    Procedure: CYSTOSCOPY URETEROSCOPY WITH  INSERTION STENT URETERAL;  Surgeon: Naren Paul MD;  Location: [facility];  Service: Urology    HI CYSTO/URETERO W/LITHOTRIPSY &INDWELL STENT INSRT Left 05/09/2019    Procedure: CYSTOSCOPY URETEROSCOPY WITH LITHOTRIPSY HOLMIUM LASER, RETROGRADE PYELOGRAM AND INSERTION STENT URETERAL;  Surgeon: Naren Paul MD;  Location: [facility];  Service: Urology    HI LAPS SURG [id number] RPBIC RAD W/NRV SPARING ROBOT N/A 03/18/2019    Procedure: ROBOTIC RADICAL PROSTATECTOMY; BILATERAL PELVIC LYMPH NODE DISSECTION;  Surgeon: Naren Paul MD;  Location[facility];  Service: Urology     Family History   Problem Relation Age of Onset    No Known Problems Mother     No Known Problems Father     Cancer Sister     No Known Problems Brother      Social History     Tobacco Use    Smoking status: Former    Smokeless tobacco: Never    Tobacco comments:     30+ years ago   Vaping Use    Vaping status: Never Used   Substance and Sexual Activity    Alcohol use: Never    Drug use: No    Sexual activity: Yes     Partners: Female     Current Outpatient Medications on File Prior to Visit   Medication Sig    co-enzyme Q-10 30 MG capsule Take 200 mg by mouth daily    FLUoxetine (PROzac) 20 mg capsule Take 1 capsule (20 mg total) by mouth 2 (two) times a day    lisinopril (ZESTRIL) 10 mg tablet TAKE ONE TABLET BY MOUTH EVERY DAY    rosuvastatin (CRESTOR) 10 MG tablet Take 1 tablet (10 mg total) by mouth daily    sildenafil (REVATIO) 20 mg tablet Take 2-3 tablets by mouth nightly     No Known Allergies  Immunization History   Administered Date(s) Administered    COVID-19 MODERNA VACC 0.5 ML IM 02/12/2021, 03/12/2021, 10/27/2021    Tdap 07/31/2017     Objective     /80   Pulse 70    "Temp 98.7 °F (37.1 °C)   Ht 5' 9\" (1.753 m)   Wt 84.8 kg (187 lb)   SpO2 97%   BMI 27.62 kg/m²     Physical Exam  Vitals and nursing note reviewed.   Constitutional:       General: He is not in acute distress.     Appearance: He is well-developed.   HENT:      Head: Normocephalic and atraumatic.   Eyes:      Conjunctiva/sclera: Conjunctivae normal.   Cardiovascular:      Rate and Rhythm: Normal rate and regular rhythm.      Heart sounds: No murmur heard.  Pulmonary:      Effort: Pulmonary effort is normal. No respiratory distress.      Breath sounds: Normal breath sounds. No wheezing, rhonchi or rales.   Abdominal:      Palpations: Abdomen is soft.      Tenderness: There is no abdominal tenderness.   Musculoskeletal:         General: No swelling.      Cervical back: Neck supple.   Skin:     General: Skin is warm and dry.      Capillary Refill: Capillary refill takes less than 2 seconds.   Neurological:      Mental Status: He is alert.   Psychiatric:         Mood and Affect: Mood normal.         "

## 2024-09-26 NOTE — ASSESSMENT & PLAN NOTE
Lab Results   Component Value Date    EGFR 61 09/24/2024    EGFR 59 03/12/2024    EGFR 68 03/04/2024    CREATININE 1.18 09/24/2024    CREATININE 1.21 03/12/2024    CREATININE 1.08 03/04/2024   Last GFR 61, avoid nsaids, stay hydrated

## 2024-10-04 ENCOUNTER — TELEPHONE (OUTPATIENT)
Dept: OBGYN CLINIC | Facility: HOSPITAL | Age: 74
End: 2024-10-04

## 2024-10-04 NOTE — TELEPHONE ENCOUNTER
I received a Care Request from patient to schedule for:    Right shoulder      I lvm to [facility] at [phone].

## 2024-10-22 ENCOUNTER — OFFICE VISIT (OUTPATIENT)
Dept: OBGYN CLINIC | Facility: CLINIC | Age: 74
End: 2024-10-22
Payer: COMMERCIAL

## 2024-10-22 ENCOUNTER — APPOINTMENT (OUTPATIENT)
Dept: RADIOLOGY | Facility: CLINIC | Age: 74
End: 2024-10-22
Payer: COMMERCIAL

## 2024-10-22 VITALS
HEART RATE: 65 BPM | HEIGHT: 69 IN | WEIGHT: 187 LBS | BODY MASS INDEX: 27.7 KG/M2 | DIASTOLIC BLOOD PRESSURE: 88 MMHG | SYSTOLIC BLOOD PRESSURE: 170 MMHG | OXYGEN SATURATION: 97 %

## 2024-10-22 DIAGNOSIS — M25.511 ACUTE PAIN OF RIGHT SHOULDER: ICD-10-CM

## 2024-10-22 DIAGNOSIS — M19.011 ARTHRITIS OF RIGHT ACROMIOCLAVICULAR JOINT: Primary | ICD-10-CM

## 2024-10-22 DIAGNOSIS — S16.1XXA CERVICAL STRAIN, INITIAL ENCOUNTER: ICD-10-CM

## 2024-10-22 DIAGNOSIS — M50.30 DEGENERATIVE DISC DISEASE, CERVICAL: ICD-10-CM

## 2024-10-22 DIAGNOSIS — M75.41 SUBACROMIAL IMPINGEMENT OF RIGHT SHOULDER: ICD-10-CM

## 2024-10-22 PROCEDURE — 72040 X-RAY EXAM NECK SPINE 2-3 VW: CPT

## 2024-10-22 PROCEDURE — 20610 DRAIN/INJ JOINT/BURSA W/O US: CPT | Performed by: FAMILY MEDICINE

## 2024-10-22 PROCEDURE — 99204 OFFICE O/P NEW MOD 45 MIN: CPT | Performed by: FAMILY MEDICINE

## 2024-10-22 PROCEDURE — 73030 X-RAY EXAM OF SHOULDER: CPT

## 2024-10-22 RX ORDER — BUPIVACAINE HYDROCHLORIDE 2.5 MG/ML
4 INJECTION, SOLUTION INFILTRATION; PERINEURAL
Status: COMPLETED | OUTPATIENT
Start: 2024-10-22 | End: 2024-10-22

## 2024-10-22 RX ORDER — BUPIVACAINE HYDROCHLORIDE 2.5 MG/ML
1 INJECTION, SOLUTION INFILTRATION; PERINEURAL
Status: COMPLETED | OUTPATIENT
Start: 2024-10-22 | End: 2024-10-22

## 2024-10-22 RX ORDER — TRIAMCINOLONE ACETONIDE 40 MG/ML
40 INJECTION, SUSPENSION INTRA-ARTICULAR; INTRAMUSCULAR
Status: COMPLETED | OUTPATIENT
Start: 2024-10-22 | End: 2024-10-22

## 2024-10-22 RX ADMIN — BUPIVACAINE HYDROCHLORIDE 4 ML: 2.5 INJECTION, SOLUTION INFILTRATION; PERINEURAL at 11:30

## 2024-10-22 RX ADMIN — TRIAMCINOLONE ACETONIDE 40 MG: 40 INJECTION, SUSPENSION INTRA-ARTICULAR; INTRAMUSCULAR at 11:30

## 2024-10-22 RX ADMIN — BUPIVACAINE HYDROCHLORIDE 1 ML: 2.5 INJECTION, SOLUTION INFILTRATION; PERINEURAL at 11:30

## 2024-10-22 NOTE — PATIENT INSTRUCTIONS
Over the counter vitamins:    - Turmeric vitamin at least 1000 mg daily    - Tart cherry vitamin at least 1000 mg daily    - Glucosamine-chondrointin 2-3 times daily      Rx: Diclofenac gel/voltaren  - 3 times daily for 10 days

## 2024-10-22 NOTE — PROGRESS NOTES
Assessment/Plan:  Assessment & Plan   Diagnoses and all orders for this visit:    Arthritis of right acromioclavicular joint  -     XR shoulder 2+ vw right; Future  -     Diclofenac Sodium (VOLTAREN) 1 %; Apply 2 g topically 3 (three) times a day as needed (Pain)    Subacromial impingement of right shoulder  -     Large joint arthrocentesis: R subacromial bursa    Degenerative disc disease, cervical  -     XR spine cervical 2 or 3 vw injury; Future    Cervical strain, initial encounter      74-year-old active right-hand-dominant male with right shoulder pain more than few months duration.  Discussed with patient clinical exam, imaging studies, impression, and plan.  X-rays right shoulder noted for AC joint degenerative changes.  X-rays cervical spine noted for multilevel degenerative changes with disc space narrowing C5-7  Imaging studies, clinical exam, and history suggest that he has symptoms from aggravated arthritis and rotator cuff tendinopathy contributing to impingement.  Symptoms persist despite home exercise program, icing, and NSAIDs.  I offered patient steroid injection.  I advised surgery is not warranted.  I administered mixture 3 cc 0.25% bupivacaine and 1 cc Kenalog to the right shoulder subacromial space without complication.  Upon reassessment demonstrated improved range of motion of the shoulder.  He is to continue home exercise program.  He is to take turmeric, tart cherry, and glucosamine supplements.  He may apply topical diclofenac gel 3 times daily.  He will follow-up as needed.      Subjective:   Patient ID: Zach Encarnacion is a 74 y.o. male.  Chief Complaint   Patient presents with    Right Shoulder - Pain, Clicking        74-year-old right-hand-dominant active male with presents evaluation right shoulder pain more than few months duration.  He denies particular trauma, but reports onset of symptoms after a week of yard work.  He reports having pain described as gradual in onset, generalized  "to the shoulder but worse to the anterior and lateral aspect, rating distally along the lateral aspect of arm, achy and throbbing, worse with moving the arm, associated with limited range of motion, and improved with rest.  He started treating with topical anesthetics, icing, and taking ibuprofen.  He also started doing home exercise program with resistance bands and stretching.  Intense symptoms subsided, but reports still having bothersome pain, particularly at nighttime when laying in certain position.      Shoulder Pain  This is a new problem. The current episode started more than 1 month ago. The problem occurs daily. The problem has been waxing and waning. Associated symptoms include arthralgias. Pertinent negatives include no joint swelling, numbness or weakness. Exacerbated by: Arm use. He has tried rest, position changes, NSAIDs and ice (Home exercise) for the symptoms. The treatment provided moderate relief.           The following portions of the patient's history were reviewed and updated as appropriate: He  has a past medical history of Arthritis, Cancer (HCC), Hyperlipidemia, Hypertension, and OCD (obsessive compulsive disorder).  He has No Known Allergies..    Review of Systems   Musculoskeletal:  Positive for arthralgias. Negative for joint swelling.   Neurological:  Negative for weakness and numbness.       Objective:  Vitals:    10/22/24 1121   BP: 170/88   Pulse: 65   SpO2: 97%   Weight: 84.8 kg (187 lb)   Height: 5' 9\" (1.753 m)      Back Exam     Comments:    Cervical spine  - No tenderness  - Normal range of motion  - Negative axial load  - Negative Spurling's  - Normal strength and sensation both upper extremities      Right Hand Exam     Muscle Strength   Wrist extension: 5/5   Wrist flexion: 5/5   : 5/5     Other   Sensation: normal  Pulse: present      Left Hand Exam     Muscle Strength   Wrist extension: 5/5   Wrist flexion: 5/5   :  5/5     Other   Sensation: normal  Pulse: " present      Right Elbow Exam     Tenderness   The patient is experiencing tenderness in the lateral epicondyle.     Range of Motion   The patient has normal right elbow ROM.    Muscle Strength   The patient has normal right elbow strength (5/5 flexion and extension).    Other   Sensation: normal      Left Elbow Exam     Other   Sensation: normal      Right Shoulder Exam     Tenderness   Right shoulder tenderness location: Anterior, lateral.    Range of Motion   Active abduction:  140   Forward flexion:  150   Internal rotation 0 degrees:  Lumbar     Muscle Strength   Abduction: 5/5   Internal rotation: 5/5   External rotation: 5/5   Supraspinatus: 5/5     Tests   Pringle test: positive    Other   Sensation: normal    Comments:  Pain with empty can test      Left Shoulder Exam     Other   Sensation: normal           Strength/Myotome Testing     Left Wrist/Hand   Wrist extension: 5  Wrist flexion: 5    Right Wrist/Hand   Wrist extension: 5  Wrist flexion: 5      Physical Exam  Vitals and nursing note reviewed.   Constitutional:       Appearance: Normal appearance. He is well-developed. He is not ill-appearing or diaphoretic.   HENT:      Head: Normocephalic and atraumatic.      Right Ear: External ear normal.      Left Ear: External ear normal.   Eyes:      Conjunctiva/sclera: Conjunctivae normal.   Neck:      Trachea: No tracheal deviation.   Cardiovascular:      Rate and Rhythm: Normal rate.   Pulmonary:      Effort: Pulmonary effort is normal. No respiratory distress.   Abdominal:      General: There is no distension.   Musculoskeletal:         General: Tenderness present.   Skin:     General: Skin is warm and dry.      Coloration: Skin is not jaundiced or pale.   Neurological:      Mental Status: He is alert and oriented to person, place, and time.   Psychiatric:         Mood and Affect: Mood normal.         Behavior: Behavior normal.         Thought Content: Thought content normal.         Judgment: Judgment  "normal.         I have personally reviewed pertinent films in PACS and my interpretation is  .  X-rays right shoulder noted for AC joint degenerative changes.  X-rays cervical spine noted for multilevel degenerative changes with disc space narrowing C5-7    Large joint arthrocentesis: R subacromial bursa  [place] Protocol:  procedure performed by consultantConsent: Verbal consent obtained.  Risks and benefits: risks, benefits and alternatives were discussed  Consent given by: patient  Time out: Immediately prior to procedure a \"time out\" was called to verify the correct patient, procedure, equipment, support staff and site/side marked as required.  Patient understanding: patient states understanding of the procedure being performed  Patient consent: the patient's understanding of the procedure matches consent given  Procedure consent: procedure consent matches procedure scheduled  Relevant documents: relevant documents present and verified  Test results: test results available and properly labeled  Site marked: the operative site was marked  Radiology Images displayed and confirmed. If images not available, report reviewed: imaging studies available  Required items: required blood products, implants, devices, and special equipment available  Patient identity confirmed: verbally with patient  Supporting Documentation  Indications: pain   Procedure Details  Location: shoulder - R subacromial bursa  Preparation: Patient was prepped and draped in the usual sterile fashion  Needle gauge: 21G 2\"  Ultrasound guidance: no  Approach: lateral  Medications administered: 4 mL bupivacaine 0.25 %; 1 mL bupivacaine 0.25 %; 40 mg triamcinolone acetonide 40 mg/mL    Patient tolerance: patient tolerated the procedure well with no immediate complications  Dressing:  Sterile dressing applied        More than 45 minutes spent reviewing patient chart, reviewing and interpreting imaging studies, obtaining history from patient, examining " patient, discussing and implementing treatment plan.

## 2024-11-25 ENCOUNTER — OFFICE VISIT (OUTPATIENT)
Dept: FAMILY MEDICINE CLINIC | Facility: CLINIC | Age: 74
End: 2024-11-25
Payer: COMMERCIAL

## 2024-11-25 VITALS
HEIGHT: 69 IN | HEART RATE: 79 BPM | RESPIRATION RATE: 18 BRPM | SYSTOLIC BLOOD PRESSURE: 132 MMHG | OXYGEN SATURATION: 97 % | DIASTOLIC BLOOD PRESSURE: 78 MMHG | TEMPERATURE: 97.9 F | WEIGHT: 197 LBS | BODY MASS INDEX: 29.18 KG/M2

## 2024-11-25 DIAGNOSIS — J01.00 ACUTE NON-RECURRENT MAXILLARY SINUSITIS: Primary | ICD-10-CM

## 2024-11-25 PROCEDURE — 99213 OFFICE O/P EST LOW 20 MIN: CPT

## 2024-11-25 PROCEDURE — G2211 COMPLEX E/M VISIT ADD ON: HCPCS

## 2024-11-25 NOTE — PROGRESS NOTES
Name: Zach Encarnacion      : 1950      MRN: [id number]  Encounter Provider: Patrick Francois DO  Encounter Date: 2024   Encounter department: [facility]  :  Assessment & Plan  Acute non-recurrent maxillary sinusitis  Patient reports right-sided maxillary sinus congestion and pain for the last several days.  No fevers or chills.  Positive sick contact and grandson.  Augmentin as below  Recommend supportive care with saline rinses, etc  Follow-up with PCP if no improvement  Orders:    amoxicillin-clavulanate (AUGMENTIN) 875-125 mg per tablet; Take 1 tablet by mouth every 12 (twelve) hours for 7 days           History of Present Illness     HPI    Reports right sided maxillary sinus congestion and pain for the last several days. Facial pain and congestion with thick mucosal discharge. No fevers or chills.  No change in appetite.  Grandson recently sick.    Patient not establishing care at our office.    Review of Systems   Constitutional:  Negative for chills and fever.   HENT:  Positive for congestion. Negative for rhinorrhea.    Eyes:  Negative for visual disturbance.   Respiratory:  Negative for cough and shortness of breath.    Cardiovascular:  Negative for chest pain and palpitations.   Gastrointestinal:  Negative for abdominal pain, constipation, diarrhea, nausea and vomiting.   Genitourinary:  Negative for dysuria.   Musculoskeletal:  Negative for arthralgias.   Skin:  Negative for rash.   Neurological:  Negative for dizziness, light-headedness and headaches.     Current Outpatient Medications on File Prior to Visit   Medication Sig Dispense Refill    co-enzyme Q-10 30 MG capsule Take 200 mg by mouth daily      Diclofenac Sodium (VOLTAREN) 1 % Apply 2 g topically 3 (three) times a day as needed (Pain) 150 g 1    FLUoxetine (PROzac) 20 mg capsule Take 1 capsule (20 mg total) by mouth 2 (two) times a day 180 capsule 1    lisinopril (ZESTRIL) 10 mg tablet Take 1 tablet (10 mg total)  "by mouth daily 90 tablet 1    rosuvastatin (CRESTOR) 10 MG tablet Take 1 tablet (10 mg total) by mouth daily 90 tablet 1    sildenafil (REVATIO) 20 mg tablet Take 2-3 tablets by mouth nightly (Patient not taking: Reported on 10/22/2024) 60 tablet 6     No current facility-administered medications on file prior to visit.         Objective   /78 (BP Location: Left arm, Patient Position: Sitting, Cuff Size: Large)   Pulse 79   Temp 97.9 °F (36.6 °C)   Resp 18   Ht 5' 9\" (1.753 m)   Wt 89.4 kg (197 lb)   SpO2 97%   BMI 29.09 kg/m²      Physical Exam  Vitals reviewed.   Constitutional:       General: He is not in acute distress.     Appearance: Normal appearance.   HENT:      Head: Normocephalic and atraumatic.      Right Ear: Tympanic membrane, ear canal and external ear normal.      Left Ear: Tympanic membrane, ear canal and external ear normal.      Nose: Congestion present.      Comments: Tenderness to palpation over maxillary sinus on the right side  No frontal sinus tenderness on exam     Mouth/Throat:      Pharynx: Oropharynx is clear. No oropharyngeal exudate or posterior oropharyngeal erythema.   Eyes:      General:         Right eye: No discharge.         Left eye: No discharge.      Extraocular Movements: Extraocular movements intact.      Conjunctiva/sclera: Conjunctivae normal.   Cardiovascular:      Rate and Rhythm: Normal rate.      Pulses: Normal pulses.   Pulmonary:      Effort: Pulmonary effort is normal.   Abdominal:      Palpations: Abdomen is soft.   Skin:     General: Skin is warm.   Neurological:      Mental Status: He is alert.   Psychiatric:         Mood and Affect: Mood normal.         Behavior: Behavior normal.       Administrative Statements   I have spent a total time of 30 minutes in caring for this patient on the day of the visit/encounter including Risks and benefits of tx options, Instructions for management, Patient and family education, Importance of tx compliance, Risk " factor reductions, Counseling / Coordination of care, Documenting in the medical record, Reviewing / ordering tests, medicine, procedures  , and Obtaining or reviewing history  .

## 2024-12-05 ENCOUNTER — OFFICE VISIT (OUTPATIENT)
Dept: FAMILY MEDICINE CLINIC | Facility: CLINIC | Age: 74
End: 2024-12-05
Payer: COMMERCIAL

## 2024-12-05 VITALS
WEIGHT: 191 LBS | BODY MASS INDEX: 28.29 KG/M2 | DIASTOLIC BLOOD PRESSURE: 72 MMHG | SYSTOLIC BLOOD PRESSURE: 126 MMHG | OXYGEN SATURATION: 96 % | HEART RATE: 75 BPM | TEMPERATURE: 97.6 F | HEIGHT: 69 IN

## 2024-12-05 DIAGNOSIS — J01.00 ACUTE NON-RECURRENT MAXILLARY SINUSITIS: Primary | ICD-10-CM

## 2024-12-05 PROCEDURE — 99213 OFFICE O/P EST LOW 20 MIN: CPT | Performed by: PHYSICIAN ASSISTANT

## 2024-12-05 PROCEDURE — G2211 COMPLEX E/M VISIT ADD ON: HCPCS | Performed by: PHYSICIAN ASSISTANT

## 2024-12-05 NOTE — PROGRESS NOTES
Name: Zach Encarnacion      : 1950      MRN: [id number]  Encounter Provider: Marciano Sultana PA-C  Encounter Date: 2024   Encounter department: [facility]    Assessment & Plan  Acute non-recurrent maxillary sinusitis  Extend Augmentin course to 10 days. Trial flonase, mucinex. Follow up prn if sx persist or worsen   Orders:    amoxicillin-clavulanate (AUGMENTIN) 875-125 mg per tablet; Take 1 tablet by mouth every 12 (twelve) hours for 3 days         History of Present Illness     Pt presents for follow up, was treated for sinusitis with 1 week of Augmentin completed yesterday but R sided pressure seemed to increase today. Otherwise sx revolved. No fevers.       Review of Systems   Constitutional:  Negative for chills, fatigue and fever.   HENT:  Positive for sinus pressure. Negative for congestion, ear pain, hearing loss, nosebleeds, postnasal drip, rhinorrhea, sinus pain, sneezing and sore throat.    Eyes:  Negative for pain, discharge, itching and visual disturbance.   Respiratory:  Negative for cough, chest tightness, shortness of breath and wheezing.    Cardiovascular:  Negative for chest pain, palpitations and leg swelling.   Gastrointestinal:  Negative for abdominal pain, blood in stool, constipation, diarrhea, nausea and vomiting.   Genitourinary:  Negative for frequency and urgency.   Neurological:  Negative for dizziness, light-headedness and numbness.     Past Medical History:   Diagnosis Date    Anxiety     Arthritis     Cancer (HCC)     prostate    Hyperlipidemia     Hypertension     Kidney stone 2011    OCD (obsessive compulsive disorder)      Past Surgical History:   Procedure Laterality Date    EYE SURGERY  2000    FINGER AMPUTATION Left     partial middle and ring    FL RETROGRADE PYELOGRAM  2019    FL RETROGRADE PYELOGRAM  2019    KNEE ARTHROSCOPY Right     acl    KNEE SURGERY Right     menisectomy    VA CYSTO/URETERO W/LITHOTRIPSY &INDWELL STENT  INSRT Left 2019    Procedure: CYSTOSCOPY URETEROSCOPY WITH  INSERTION STENT URETERAL;  Surgeon: Naren Paul MD;  Location: [facility];  Service: Urology    AR CYSTO/URETERO W/LITHOTRIPSY &INDWELL STENT INSRT Left 2019    Procedure: CYSTOSCOPY URETEROSCOPY WITH LITHOTRIPSY HOLMIUM LASER, RETROGRADE PYELOGRAM AND INSERTION STENT URETERAL;  Surgeon: Naren Paul MD;  Location: [facility];  Service: Urology    AR LAPS SURG [id number] RPBIC RAD W/NRV SPARING ROBOT N/A 2019    Procedure: ROBOTIC RADICAL PROSTATECTOMY; BILATERAL PELVIC LYMPH NODE DISSECTION;  Surgeon: Naren Paul MD;  Location[facility];  Service: Urology    PROSTATE SURGERY  2019     Family History   Problem Relation Age of Onset    Stroke Mother     COPD Mother     Hypertension Father     COPD Father     Cancer Sister     No Known Problems Brother     Alcohol abuse Brother     Cancer Sister      Social History     Tobacco Use    Smoking status: Former     Current packs/day: 0.00     Average packs/day: 1 pack/day for 5.0 years (5.0 ttl pk-yrs)     Types: Cigarettes     Quit date: 1972     Years since quittin.5    Smokeless tobacco: Never    Tobacco comments:     30+ years ago   Vaping Use    Vaping status: Never Used   Substance and Sexual Activity    Alcohol use: No    Drug use: No    Sexual activity: Yes     Partners: Female     Birth control/protection: Post-menopausal     Current Outpatient Medications on File Prior to Visit   Medication Sig    co-enzyme Q-10 30 MG capsule Take 200 mg by mouth daily    Diclofenac Sodium (VOLTAREN) 1 % Apply 2 g topically 3 (three) times a day as needed (Pain)    FLUoxetine (PROzac) 20 mg capsule Take 1 capsule (20 mg total) by mouth 2 (two) times a day    lisinopril (ZESTRIL) 10 mg tablet Take 1 tablet (10 mg total) by mouth daily    rosuvastatin (CRESTOR) 10 MG tablet Take 1 tablet (10 mg total) by mouth daily    sildenafil (REVATIO) 20 mg tablet Take 2-3 tablets by mouth  "nightly (Patient not taking: Reported on 10/22/2024)     No Known Allergies  Immunization History   Administered Date(s) Administered    COVID-19 MODERNA VACC 0.5 ML IM 02/12/2021, 03/12/2021, 10/27/2021    Tdap 07/31/2017     Objective   /72   Pulse 75   Temp 97.6 °F (36.4 °C)   Ht 5' 9\" (1.753 m)   Wt 86.6 kg (191 lb)   SpO2 96%   BMI 28.21 kg/m²     Physical Exam  Vitals and nursing note reviewed.   Constitutional:       Appearance: Normal appearance.   HENT:      Head: Normocephalic and atraumatic.      Right Ear: Tympanic membrane normal.      Left Ear: Tympanic membrane normal.      Nose:      Right Sinus: Maxillary sinus tenderness present.      Mouth/Throat:      Mouth: Mucous membranes are moist.      Pharynx: Oropharynx is clear.   Cardiovascular:      Rate and Rhythm: Normal rate and regular rhythm.      Heart sounds: Normal heart sounds.   Pulmonary:      Effort: Pulmonary effort is normal.      Breath sounds: Normal breath sounds. No rhonchi or rales.   Chest:      Chest wall: No tenderness.   Musculoskeletal:      Cervical back: Normal range of motion.   Skin:     General: Skin is warm and dry.   Neurological:      Mental Status: He is alert and oriented to person, place, and time.         "

## 2024-12-19 DIAGNOSIS — I10 ESSENTIAL HYPERTENSION: ICD-10-CM

## 2024-12-19 DIAGNOSIS — F42.8 OTHER OBSESSIVE-COMPULSIVE DISORDERS: ICD-10-CM

## 2024-12-20 RX ORDER — LISINOPRIL 10 MG/1
10 TABLET ORAL DAILY
Qty: 90 TABLET | Refills: 1 | Status: SHIPPED | OUTPATIENT
Start: 2024-12-20 | End: 2025-06-18

## 2025-03-13 DIAGNOSIS — I10 ESSENTIAL HYPERTENSION: ICD-10-CM

## 2025-03-14 RX ORDER — LISINOPRIL 10 MG/1
10 TABLET ORAL DAILY
Qty: 90 TABLET | Refills: 1 | Status: SHIPPED | OUTPATIENT
Start: 2025-03-14 | End: 2025-09-10

## 2025-03-17 ENCOUNTER — TELEPHONE (OUTPATIENT)
Age: 75
End: 2025-03-17

## 2025-03-18 DIAGNOSIS — N18.30 STAGE 3 CHRONIC KIDNEY DISEASE, UNSPECIFIED WHETHER STAGE 3A OR 3B CKD (HCC): ICD-10-CM

## 2025-03-18 DIAGNOSIS — I10 ESSENTIAL HYPERTENSION: Primary | ICD-10-CM

## 2025-03-18 DIAGNOSIS — E78.2 HYPERLIPIDEMIA, MIXED: ICD-10-CM

## 2025-03-20 ENCOUNTER — RA CDI HCC (OUTPATIENT)
Dept: OTHER | Facility: HOSPITAL | Age: 75
End: 2025-03-20

## 2025-03-25 ENCOUNTER — APPOINTMENT (OUTPATIENT)
Dept: LAB | Facility: CLINIC | Age: 75
End: 2025-03-25
Payer: COMMERCIAL

## 2025-03-25 DIAGNOSIS — E78.2 HYPERLIPIDEMIA, MIXED: ICD-10-CM

## 2025-03-25 DIAGNOSIS — N18.30 STAGE 3 CHRONIC KIDNEY DISEASE, UNSPECIFIED WHETHER STAGE 3A OR 3B CKD (HCC): ICD-10-CM

## 2025-03-25 DIAGNOSIS — I10 ESSENTIAL HYPERTENSION: ICD-10-CM

## 2025-03-25 LAB
ALBUMIN SERPL BCG-MCNC: 4.3 G/DL (ref 3.5–5)
ALP SERPL-CCNC: 67 U/L (ref 34–104)
ALT SERPL W P-5'-P-CCNC: 17 U/L (ref 7–52)
ANION GAP SERPL CALCULATED.3IONS-SCNC: 8 MMOL/L (ref 4–13)
AST SERPL W P-5'-P-CCNC: 18 U/L (ref 13–39)
BASOPHILS # BLD AUTO: 0.04 THOUSANDS/ÂΜL (ref 0–0.1)
BASOPHILS NFR BLD AUTO: 1 % (ref 0–1)
BILIRUB SERPL-MCNC: 0.51 MG/DL (ref 0.2–1)
BUN SERPL-MCNC: 20 MG/DL (ref 5–25)
CALCIUM SERPL-MCNC: 8.9 MG/DL (ref 8.4–10.2)
CHLORIDE SERPL-SCNC: 104 MMOL/L (ref 96–108)
CHOLEST SERPL-MCNC: 179 MG/DL (ref ?–200)
CO2 SERPL-SCNC: 28 MMOL/L (ref 21–32)
CREAT SERPL-MCNC: 1.08 MG/DL (ref 0.6–1.3)
EOSINOPHIL # BLD AUTO: 0.14 THOUSAND/ÂΜL (ref 0–0.61)
EOSINOPHIL NFR BLD AUTO: 3 % (ref 0–6)
ERYTHROCYTE [DISTWIDTH] IN BLOOD BY AUTOMATED COUNT: 12.6 % (ref 11.6–15.1)
GFR SERPL CREATININE-BSD FRML MDRD: 67 ML/MIN/1.73SQ M
GLUCOSE P FAST SERPL-MCNC: 92 MG/DL (ref 65–99)
HCT VFR BLD AUTO: 43.7 % (ref 36.5–49.3)
HDLC SERPL-MCNC: 43 MG/DL
HGB BLD-MCNC: 14.5 G/DL (ref 12–17)
IMM GRANULOCYTES # BLD AUTO: 0 THOUSAND/UL (ref 0–0.2)
IMM GRANULOCYTES NFR BLD AUTO: 0 % (ref 0–2)
LDLC SERPL CALC-MCNC: 118 MG/DL (ref 0–100)
LYMPHOCYTES # BLD AUTO: 1.37 THOUSANDS/ÂΜL (ref 0.6–4.47)
LYMPHOCYTES NFR BLD AUTO: 27 % (ref 14–44)
MCH RBC QN AUTO: 29.7 PG (ref 26.8–34.3)
MCHC RBC AUTO-ENTMCNC: 33.2 G/DL (ref 31.4–37.4)
MCV RBC AUTO: 89 FL (ref 82–98)
MONOCYTES # BLD AUTO: 0.55 THOUSAND/ÂΜL (ref 0.17–1.22)
MONOCYTES NFR BLD AUTO: 11 % (ref 4–12)
NEUTROPHILS # BLD AUTO: 2.92 THOUSANDS/ÂΜL (ref 1.85–7.62)
NEUTS SEG NFR BLD AUTO: 58 % (ref 43–75)
NRBC BLD AUTO-RTO: 0 /100 WBCS
PLATELET # BLD AUTO: 256 THOUSANDS/UL (ref 149–390)
PMV BLD AUTO: 9.3 FL (ref 8.9–12.7)
POTASSIUM SERPL-SCNC: 3.9 MMOL/L (ref 3.5–5.3)
PROT SERPL-MCNC: 7 G/DL (ref 6.4–8.4)
RBC # BLD AUTO: 4.89 MILLION/UL (ref 3.88–5.62)
SODIUM SERPL-SCNC: 140 MMOL/L (ref 135–147)
TRIGL SERPL-MCNC: 91 MG/DL (ref ?–150)
WBC # BLD AUTO: 5.02 THOUSAND/UL (ref 4.31–10.16)

## 2025-03-25 PROCEDURE — 80053 COMPREHEN METABOLIC PANEL: CPT

## 2025-03-25 PROCEDURE — 80061 LIPID PANEL: CPT

## 2025-03-25 PROCEDURE — 85025 COMPLETE CBC W/AUTO DIFF WBC: CPT

## 2025-03-25 PROCEDURE — 36415 COLL VENOUS BLD VENIPUNCTURE: CPT

## 2025-03-26 ENCOUNTER — RESULTS FOLLOW-UP (OUTPATIENT)
Dept: FAMILY MEDICINE CLINIC | Facility: CLINIC | Age: 75
End: 2025-03-26

## 2025-03-27 ENCOUNTER — OFFICE VISIT (OUTPATIENT)
Dept: FAMILY MEDICINE CLINIC | Facility: CLINIC | Age: 75
End: 2025-03-27
Payer: COMMERCIAL

## 2025-03-27 VITALS
OXYGEN SATURATION: 100 % | BODY MASS INDEX: 27.11 KG/M2 | TEMPERATURE: 98.9 F | SYSTOLIC BLOOD PRESSURE: 122 MMHG | HEIGHT: 69 IN | DIASTOLIC BLOOD PRESSURE: 78 MMHG | WEIGHT: 183 LBS | HEART RATE: 63 BPM

## 2025-03-27 DIAGNOSIS — I10 ESSENTIAL HYPERTENSION: Primary | ICD-10-CM

## 2025-03-27 DIAGNOSIS — Z00.00 MEDICARE ANNUAL WELLNESS VISIT, SUBSEQUENT: ICD-10-CM

## 2025-03-27 DIAGNOSIS — F42.9 OBSESSIVE-COMPULSIVE DISORDER, UNSPECIFIED TYPE: ICD-10-CM

## 2025-03-27 DIAGNOSIS — E78.2 HYPERLIPIDEMIA, MIXED: ICD-10-CM

## 2025-03-27 DIAGNOSIS — N18.31 STAGE 3A CHRONIC KIDNEY DISEASE (HCC): ICD-10-CM

## 2025-03-27 PROBLEM — K56.609 SBO (SMALL BOWEL OBSTRUCTION) (HCC): Status: RESOLVED | Noted: 2024-03-02 | Resolved: 2025-03-27

## 2025-03-27 PROCEDURE — G0439 PPPS, SUBSEQ VISIT: HCPCS | Performed by: PHYSICIAN ASSISTANT

## 2025-03-27 PROCEDURE — 99214 OFFICE O/P EST MOD 30 MIN: CPT | Performed by: PHYSICIAN ASSISTANT

## 2025-03-27 PROCEDURE — G2211 COMPLEX E/M VISIT ADD ON: HCPCS | Performed by: PHYSICIAN ASSISTANT

## 2025-03-27 NOTE — PROGRESS NOTES
Name: Zach Encarnacion      : 1950      MRN: [id number]  Encounter Provider: Marciano Sultana PA-C  Encounter Date: 3/27/2025   Encounter department: [facility]    Assessment & Plan  Essential hypertension  At goal on lisinopril, continue       Stage 3a chronic kidney disease (HCC)  Lab Results   Component Value Date    EGFR 67 2025    EGFR 61 2024    EGFR 59 2024    CREATININE 1.08 2025    CREATININE 1.18 2024    CREATININE 1.21 2024   Borderline CKD2/3a  Avoid nephrotoxins         Hyperlipidemia, mixed  Continue statin, low fat diet       Obsessive-compulsive disorder, unspecified type  Continue prozac  controlled       Medicare annual wellness visit, subsequent            Preventive health issues were discussed with patient, and age appropriate screening tests were ordered as noted in patient's After Visit Summary. Personalized health advice and appropriate referrals for health education or preventive services given if needed, as noted in patient's After Visit Summary.    History of Present Illness     Pt presents for follow up, AWV. No acute concerns today.      HTN: well controlled on lisinopril, 122/78, no end organ complaints.   HLD: +statin, as below  OCD: controlled on prozac BID  Hx of prostate cancer, hx of prostatectomy, PSA being followed, remained undetectable last check      Recent Results (from the past 4 weeks)  -Comprehensive metabolic panel:   Collection Time: 25  8:08 AM       Result                      Value             Ref Range           Sodium                      140               135 - 147 mm*       Potassium                   3.9               3.5 - 5.3 mm*       Chloride                    104               96 - 108 mmo*       CO2                         28                21 - 32 mmol*       ANION GAP                   8                 4 - 13 mmol/L       BUN                         20                5 - 25 mg/dL         Creatinine                  1.08              0.60 - 1.30 *       Glucose, Fasting            92                65 - 99 mg/dL       Calcium                     8.9               8.4 - 10.2 m*       AST                         18                13 - 39 U/L         ALT                         17                7 - 52 U/L          Alkaline Phosphatase        67                34 - 104 U/L        Total Protein               7.0               6.4 - 8.4 g/*       Albumin                     4.3               3.5 - 5.0 g/*       Total Bilirubin             0.51              0.20 - 1.00 *       eGFR                        67                ml/min/1.73s*  -Lipid Panel with Direct LDL reflex:   Collection Time: 03/25/25  8:08 AM       Result                      Value             Ref Range           Cholesterol                 179               See Comment *       Triglycerides               91                See Comment *       HDL, Direct                 43                >=40 mg/dL          LDL Calculated              118 (H)           0 - 100 mg/dL  -CBC and differential:   Collection Time: 03/25/25  8:08 AM       Result                      Value             Ref Range           WBC                         5.02              4.31 - 10.16*       RBC                         4.89              3.88 - 5.62 *       Hemoglobin                  14.5              12.0 - 17.0 *       Hematocrit                  43.7              36.5 - 49.3 %       MCV                         89                82 - 98 fL          MCH                         29.7              26.8 - 34.3 *       MCHC                        33.2              31.4 - 37.4 *       RDW                         12.6              11.6 - 15.1 %       MPV                         9.3               8.9 - 12.7 fL       Platelets                   256               149 - 390 Th*       nRBC                        0                 /100 WBCs           Segmented %                 58                 43 - 75 %           Immature Grans %            0                 0 - 2 %             Lymphocytes %               27                14 - 44 %           Monocytes %                 11                4 - 12 %            Eosinophils Relative        3                 0 - 6 %             Basophils Relative          1                 0 - 1 %             Absolute Neutrophils        2.92              1.85 - 7.62 *       Absolute Immature Grans     0.00              0.00 - 0.20 *       Absolute Lymphocytes        1.37              0.60 - 4.47 *       Absolute Monocytes          0.55              0.17 - 1.22 *       Eosinophils Absolute        0.14              0.00 - 0.61 *       Basophils Absolute          0.04              0.00 - 0.10 *           Patient Care Team:  Marciano Sultana PA-C as PCP - General (Family Medicine)  Naren Paul MD (Urology)  Benita Quispe MD as Consulting Physician (Nephrology)  Rosalie Nuñez PA-C as Physician Assistant (Physician Assistant)    Review of Systems   Constitutional:  Negative for chills, fatigue and fever.   HENT:  Negative for congestion, ear pain, hearing loss, nosebleeds, postnasal drip, rhinorrhea, sinus pressure, sinus pain, sneezing and sore throat.    Eyes:  Negative for pain, discharge, itching and visual disturbance.   Respiratory:  Negative for cough, chest tightness, shortness of breath and wheezing.    Cardiovascular:  Negative for chest pain, palpitations and leg swelling.   Gastrointestinal:  Negative for abdominal pain, blood in stool, constipation, diarrhea, nausea and vomiting.   Genitourinary:  Negative for frequency and urgency.   Musculoskeletal: Negative.    Neurological:  Negative for dizziness, light-headedness and numbness.   Psychiatric/Behavioral: Negative.       Medical History Reviewed by provider this encounter:  Tobacco  Allergies  Meds  Problems  Med Hx  Surg Hx  Fam Hx       Annual Wellness Visit Questionnaire   Zach cunningham  here for his Subsequent Wellness visit.     Health Risk Assessment:   Patient rates overall health as very good. Patient feels that their physical health rating is slightly better. Patient is satisfied with their life. Eyesight was rated as slightly worse. Hearing was rated as same. Patient feels that their emotional and mental health rating is same. Patients states they are never, rarely angry. Patient states they are never, rarely unusually tired/fatigued. Pain experienced in the last 7 days has been none. Patient states that he has experienced no weight loss or gain in last 6 months.     Depression Screening:   PHQ-2 Score: 0      Fall Risk Screening:   In the past year, patient has experienced: no history of falling in past year      Home Safety:  Patient does not have trouble with stairs inside or outside of their home. Patient has working smoke alarms and has working carbon monoxide detector. Home safety hazards include: none.     Nutrition:   Current diet is Regular.     Medications:   Patient is not currently taking any over-the-counter supplements. Patient is able to manage medications.     Activities of Daily Living (ADLs)/Instrumental Activities of Daily Living (IADLs):   Walk and transfer into and out of bed and chair?: Yes  Dress and groom yourself?: Yes    Bathe or shower yourself?: Yes    Feed yourself? Yes  Do your laundry/housekeeping?: Yes  Manage your money, pay your bills and track your expenses?: Yes  Make your own meals?: Yes    Do your own shopping?: Yes    Durable Medical Equipment Suppliers  None    Previous Hospitalizations:   Any hospitalizations or ED visits within the last 12 months?: No      Advance Care Planning:   Living will: No    Durable POA for healthcare: No    Advanced directive: No    Advanced directive counseling given: Yes    ACP document given: Yes      PREVENTIVE SCREENINGS      Cardiovascular Screening:    General: Screening Not Indicated and History Lipid Disorder       Diabetes Screening:     General: Screening Current      Colorectal Cancer Screening:     General: Screening Current      Prostate Cancer Screening:    General: History Prostate Cancer      Abdominal Aortic Aneurysm (AAA) Screening:    Risk factors include: age between 65-74 yo and tobacco use        Lung Cancer Screening:     General: Screening Not Indicated      Hepatitis C Screening:    General: Screening Current    Screening, Brief Intervention, and Referral to Treatment (SBIRT)     Screening  Typical number of drinks in a day: 0  Typical number of drinks in a week: 0  Interpretation: Low risk drinking behavior.    AUDIT-C Screenin) How often did you have a drink containing alcohol in the past year? never  2) How many drinks did you have on a typical day when you were drinking in the past year? 0  3) How often did you have 6 or more drinks on one occasion in the past year? never    AUDIT-C Score: 0  Interpretation: Score 0-3 (male): Negative screen for alcohol misuse    Single Item Drug Screening:  How often have you used an illegal drug (including marijuana) or a prescription medication for non-medical reasons in the past year? never    Single Item Drug Screen Score: 0  Interpretation: Negative screen for possible drug use disorder    Social Drivers of Health     Financial Resource Strain: Low Risk  (2024)    Overall Financial Resource Strain (CARDIA)    • Difficulty of Paying Living Expenses: Not hard at all   Food Insecurity: No Food Insecurity (3/24/2025)    Hunger Vital Sign    • Worried About Running Out of Food in the Last Year: Never true    • Ran Out of Food in the Last Year: Never true   Transportation Needs: No Transportation Needs (3/24/2025)    PRAPARE - Transportation    • Lack of Transportation (Medical): No    • Lack of Transportation (Non-Medical): No   Housing Stability: Low Risk  (3/24/2025)    Housing Stability Vital Sign    • Unable to Pay for Housing in the Last Year: No    •  "Number of Times Moved in the Last Year: 0    • Homeless in the Last Year: No   Utilities: Not At Risk (3/24/2025)    [facility] Utilities    • Threatened with loss of utilities: No     No results found.    Objective   /78   Pulse 63   Temp 98.9 °F (37.2 °C)   Ht 5' 9\" (1.753 m)   Wt 83 kg (183 lb)   SpO2 100%   BMI 27.02 kg/m²     Physical Exam  Vitals and nursing note reviewed.   Constitutional:       General: He is not in acute distress.     Appearance: He is well-developed.   HENT:      Head: Normocephalic and atraumatic.   Eyes:      Conjunctiva/sclera: Conjunctivae normal.   Cardiovascular:      Rate and Rhythm: Normal rate and regular rhythm.      Heart sounds: No murmur heard.  Pulmonary:      Effort: Pulmonary effort is normal. No respiratory distress.      Breath sounds: Normal breath sounds. No wheezing, rhonchi or rales.   Abdominal:      Palpations: Abdomen is soft.      Tenderness: There is no abdominal tenderness.   Musculoskeletal:         General: No swelling.      Cervical back: Neck supple.   Skin:     General: Skin is warm and dry.      Capillary Refill: Capillary refill takes less than 2 seconds.   Neurological:      Mental Status: He is alert.   Psychiatric:         Mood and Affect: Mood normal.         "

## 2025-03-27 NOTE — ASSESSMENT & PLAN NOTE
Lab Results   Component Value Date    EGFR 67 03/25/2025    EGFR 61 09/24/2024    EGFR 59 03/12/2024    CREATININE 1.08 03/25/2025    CREATININE 1.18 09/24/2024    CREATININE 1.21 03/12/2024   Borderline CKD2/3a  Avoid nephrotoxins

## 2025-05-06 DIAGNOSIS — E78.2 HYPERLIPIDEMIA, MIXED: ICD-10-CM

## 2025-05-07 RX ORDER — ROSUVASTATIN CALCIUM 10 MG/1
10 TABLET, COATED ORAL DAILY
Qty: 90 TABLET | Refills: 1 | Status: SHIPPED | OUTPATIENT
Start: 2025-05-07 | End: 2025-11-03

## 2025-06-11 DIAGNOSIS — I10 ESSENTIAL HYPERTENSION: ICD-10-CM

## 2025-06-12 RX ORDER — LISINOPRIL 10 MG/1
10 TABLET ORAL DAILY
Qty: 90 TABLET | Refills: 1 | Status: SHIPPED | OUTPATIENT
Start: 2025-06-12 | End: 2025-12-09

## 2025-07-22 ENCOUNTER — TELEPHONE (OUTPATIENT)
Dept: GASTROENTEROLOGY | Facility: CLINIC | Age: 75
End: 2025-07-22

## 2025-07-23 ENCOUNTER — TELEPHONE (OUTPATIENT)
Dept: GASTROENTEROLOGY | Facility: CLINIC | Age: 75
End: 2025-07-23

## 2025-07-29 ENCOUNTER — APPOINTMENT (OUTPATIENT)
Dept: RADIOLOGY | Facility: CLINIC | Age: 75
End: 2025-07-29
Attending: FAMILY MEDICINE
Payer: COMMERCIAL

## 2025-07-29 VITALS — BODY MASS INDEX: 28.44 KG/M2 | HEIGHT: 69 IN | WEIGHT: 192 LBS

## 2025-07-29 DIAGNOSIS — S16.1XXA CERVICAL STRAIN, INITIAL ENCOUNTER: ICD-10-CM

## 2025-07-29 DIAGNOSIS — M62.838 CERVICAL PARASPINAL MUSCLE SPASM: ICD-10-CM

## 2025-07-29 DIAGNOSIS — M25.512 ACUTE PAIN OF LEFT SHOULDER: ICD-10-CM

## 2025-07-29 DIAGNOSIS — M54.12 CERVICAL RADICULOPATHY: Primary | ICD-10-CM

## 2025-07-29 DIAGNOSIS — M50.30 DEGENERATIVE DISC DISEASE, CERVICAL: ICD-10-CM

## 2025-07-29 DIAGNOSIS — M47.812 FACET ARTHROPATHY, CERVICAL: ICD-10-CM

## 2025-07-29 PROCEDURE — 73030 X-RAY EXAM OF SHOULDER: CPT

## 2025-07-29 PROCEDURE — 99214 OFFICE O/P EST MOD 30 MIN: CPT | Performed by: FAMILY MEDICINE

## 2025-07-29 RX ORDER — PREDNISONE 20 MG/1
20 TABLET ORAL 2 TIMES DAILY WITH MEALS
Qty: 10 TABLET | Refills: 0 | Status: SHIPPED | OUTPATIENT
Start: 2025-07-29 | End: 2025-08-03

## 2025-08-04 ENCOUNTER — HOSPITAL ENCOUNTER (OUTPATIENT)
Dept: RADIOLOGY | Facility: MEDICAL CENTER | Age: 75
Discharge: HOME | End: 2025-08-04
Payer: COMMERCIAL

## 2025-08-04 DIAGNOSIS — J32.0 CHRONIC MAXILLARY SINUSITIS: ICD-10-CM

## 2025-08-04 PROCEDURE — 70486 CT MAXILLOFACIAL W/O DYE: CPT

## 2025-08-12 ENCOUNTER — APPOINTMENT (OUTPATIENT)
Dept: LAB | Facility: MEDICAL CENTER | Age: 75
End: 2025-08-12
Payer: COMMERCIAL

## 2025-08-14 ENCOUNTER — OFFICE VISIT (OUTPATIENT)
Dept: LAB | Facility: HOSPITAL | Age: 75
End: 2025-08-14
Payer: COMMERCIAL

## 2025-08-18 ENCOUNTER — ANESTHESIA EVENT (OUTPATIENT)
Dept: PERIOP | Facility: HOSPITAL | Age: 75
End: 2025-08-18
Payer: COMMERCIAL

## 2025-08-20 ENCOUNTER — ANESTHESIA (OUTPATIENT)
Dept: PERIOP | Facility: HOSPITAL | Age: 75
End: 2025-08-20
Payer: COMMERCIAL

## 2025-08-20 PROBLEM — J32.0 CHRONIC MAXILLARY SINUSITIS: Status: ACTIVE | Noted: 2025-08-20

## 2025-08-20 RX ORDER — HYDROMORPHONE HCL/PF 1 MG/ML
SYRINGE (ML) INJECTION AS NEEDED
Status: DISCONTINUED | OUTPATIENT
Start: 2025-08-20 | End: 2025-08-20

## 2025-08-20 RX ORDER — PHENYLEPHRINE HCL IN 0.9% NACL 1 MG/10 ML
SYRINGE (ML) INTRAVENOUS AS NEEDED
Status: DISCONTINUED | OUTPATIENT
Start: 2025-08-20 | End: 2025-08-20

## 2025-08-20 RX ORDER — SODIUM CHLORIDE, SODIUM LACTATE, POTASSIUM CHLORIDE, CALCIUM CHLORIDE 600; 310; 30; 20 MG/100ML; MG/100ML; MG/100ML; MG/100ML
INJECTION, SOLUTION INTRAVENOUS CONTINUOUS PRN
Status: DISCONTINUED | OUTPATIENT
Start: 2025-08-20 | End: 2025-08-20

## 2025-08-20 RX ORDER — DEXAMETHASONE SODIUM PHOSPHATE 10 MG/ML
INJECTION, SOLUTION INTRAMUSCULAR; INTRAVENOUS AS NEEDED
Status: DISCONTINUED | OUTPATIENT
Start: 2025-08-20 | End: 2025-08-20

## 2025-08-20 RX ORDER — FENTANYL CITRATE 50 UG/ML
INJECTION, SOLUTION INTRAMUSCULAR; INTRAVENOUS AS NEEDED
Status: DISCONTINUED | OUTPATIENT
Start: 2025-08-20 | End: 2025-08-20

## 2025-08-20 RX ORDER — PROPOFOL 10 MG/ML
INJECTION, EMULSION INTRAVENOUS AS NEEDED
Status: DISCONTINUED | OUTPATIENT
Start: 2025-08-20 | End: 2025-08-20

## 2025-08-20 RX ORDER — ONDANSETRON 2 MG/ML
INJECTION INTRAMUSCULAR; INTRAVENOUS AS NEEDED
Status: DISCONTINUED | OUTPATIENT
Start: 2025-08-20 | End: 2025-08-20

## 2025-08-20 RX ORDER — ROCURONIUM BROMIDE 10 MG/ML
INJECTION, SOLUTION INTRAVENOUS AS NEEDED
Status: DISCONTINUED | OUTPATIENT
Start: 2025-08-20 | End: 2025-08-20

## 2025-08-20 RX ORDER — LIDOCAINE HYDROCHLORIDE 10 MG/ML
INJECTION, SOLUTION EPIDURAL; INFILTRATION; INTRACAUDAL; PERINEURAL AS NEEDED
Status: DISCONTINUED | OUTPATIENT
Start: 2025-08-20 | End: 2025-08-20

## 2025-08-20 RX ADMIN — PROPOFOL 20 MG: 10 INJECTION, EMULSION INTRAVENOUS at 13:37

## 2025-08-20 RX ADMIN — Medication 100 MCG: at 14:19

## 2025-08-20 RX ADMIN — PROPOFOL 30 MG: 10 INJECTION, EMULSION INTRAVENOUS at 13:15

## 2025-08-20 RX ADMIN — PROPOFOL 50 MG: 10 INJECTION, EMULSION INTRAVENOUS at 13:06

## 2025-08-20 RX ADMIN — FENTANYL CITRATE 50 MCG: 50 INJECTION, SOLUTION INTRAMUSCULAR; INTRAVENOUS at 13:03

## 2025-08-20 RX ADMIN — ONDANSETRON 4 MG: 2 INJECTION INTRAMUSCULAR; INTRAVENOUS at 14:35

## 2025-08-20 RX ADMIN — Medication 100 MCG: at 13:24

## 2025-08-20 RX ADMIN — SUGAMMADEX 200 MG: 100 INJECTION, SOLUTION INTRAVENOUS at 14:54

## 2025-08-20 RX ADMIN — PROPOFOL 150 MG: 10 INJECTION, EMULSION INTRAVENOUS at 13:04

## 2025-08-20 RX ADMIN — PROPOFOL 50 MG: 10 INJECTION, EMULSION INTRAVENOUS at 14:03

## 2025-08-20 RX ADMIN — Medication 100 MCG: at 13:17

## 2025-08-20 RX ADMIN — PROPOFOL 30 MCG/KG/MIN: 10 INJECTION, EMULSION INTRAVENOUS at 13:09

## 2025-08-20 RX ADMIN — SODIUM CHLORIDE, SODIUM LACTATE, POTASSIUM CHLORIDE, AND CALCIUM CHLORIDE: .6; .31; .03; .02 INJECTION, SOLUTION INTRAVENOUS at 12:50

## 2025-08-20 RX ADMIN — HYDROMORPHONE HYDROCHLORIDE 0.25 MG: 1 INJECTION, SOLUTION INTRAMUSCULAR; INTRAVENOUS; SUBCUTANEOUS at 14:03

## 2025-08-20 RX ADMIN — DEXAMETHASONE SODIUM PHOSPHATE 10 MG: 10 INJECTION INTRAMUSCULAR; INTRAVENOUS at 13:04

## 2025-08-20 RX ADMIN — FENTANYL CITRATE 25 MCG: 50 INJECTION, SOLUTION INTRAMUSCULAR; INTRAVENOUS at 13:37

## 2025-08-20 RX ADMIN — ROCURONIUM 10 MG: 50 INJECTION, SOLUTION INTRAVENOUS at 14:06

## 2025-08-20 RX ADMIN — FENTANYL CITRATE 25 MCG: 50 INJECTION, SOLUTION INTRAMUSCULAR; INTRAVENOUS at 13:15

## 2025-08-20 RX ADMIN — HYDROMORPHONE HYDROCHLORIDE 0.25 MG: 1 INJECTION, SOLUTION INTRAMUSCULAR; INTRAVENOUS; SUBCUTANEOUS at 13:49

## 2025-08-20 RX ADMIN — LIDOCAINE HYDROCHLORIDE 50 MG: 10 INJECTION, SOLUTION EPIDURAL; INFILTRATION; INTRACAUDAL at 13:03

## 2025-08-20 RX ADMIN — ROCURONIUM 50 MG: 50 INJECTION, SOLUTION INTRAVENOUS at 13:05

## 2025-08-20 RX ADMIN — ACETAMINOPHEN 1000 MG: 10 INJECTION, SOLUTION INTRAVENOUS at 14:08

## 2025-08-20 RX ADMIN — PROPOFOL 40 MG: 10 INJECTION, EMULSION INTRAVENOUS at 14:49

## (undated) DEVICE — LARGE NEEDLE DRIVER: Brand: ENDOWRIST;DAVINCI SI

## (undated) DEVICE — URIMETER 2500ML

## (undated) DEVICE — PACK TUR

## (undated) DEVICE — LASER FIBER HOLMIUM 272MICRON

## (undated) DEVICE — ROBOT ACCESSORY KIT 4 ARM

## (undated) DEVICE — SUT VICRYL 0 UR-6 27 IN J603H

## (undated) DEVICE — FLOSEAL HEMOSTATIC MATRIX, 10 ML: Brand: FLOSEAL

## (undated) DEVICE — GLOVE INDICATOR PI UNDERGLOVE SZ 8 BLUE

## (undated) DEVICE — BIPOLAR CORD DISP

## (undated) DEVICE — MONOPOLAR CURVED SCISSORS: Brand: ENDOWRIST;DAVINCI SI

## (undated) DEVICE — INVIEW CLEAR LEGGINGS: Brand: CONVERTORS

## (undated) DEVICE — JACKSON-PRATT 100CC BULB RESERVOIR: Brand: CARDINAL HEALTH

## (undated) DEVICE — GUIDEWIRE STRGHT TIP 0.035 IN  SOLO PLUS

## (undated) DEVICE — SCD SEQUENTIAL COMPRESSION COMFORT SLEEVE MEDIUM KNEE LENGTH: Brand: KENDALL SCD

## (undated) DEVICE — TROCAR: Brand: KII FIOS FIRST ENTRY

## (undated) DEVICE — LUBRICANT SURGILUBE TUBE 4 OZ  FLIP TOP

## (undated) DEVICE — SUT VICRYL 2-0 CT-1 27 IN J259H

## (undated) DEVICE — LIGHT HANDLE COVER SLEEVE DISP BLUE STELLAR

## (undated) DEVICE — ASTOUND STANDARD SURGICAL GOWN, XL: Brand: CONVERTORS

## (undated) DEVICE — BASKET SPECIMEN RETRIVAL 1.9FR 120CM

## (undated) DEVICE — CHLORHEXIDINE 4PCT 4 OZ

## (undated) DEVICE — MARYLAND BIPOLAR FORCEPS: Brand: ENDOWRIST;DAVINCI SI

## (undated) DEVICE — SYRINGE 10ML LL

## (undated) DEVICE — HEM-O-LOK CLIP CARTRIDGE LARGE DA VINCI SI/XI

## (undated) DEVICE — VIAL DECANTER

## (undated) DEVICE — CHLORAPREP HI-LITE 26ML ORANGE

## (undated) DEVICE — BONEE® NEEDLE FOR BLADDER INJECTION CH FR 05, 22G, 35 CM, BOX OF 1: Brand: PORGES COLOPLAST

## (undated) DEVICE — SHEATH URETERAL ACCESS 12/14FR 35CM PROXIS

## (undated) DEVICE — VESSEL SEALER: Brand: ENDOWRIST;DAVINCI SI

## (undated) DEVICE — INSUFFLATION NEEDLE TO ESTABLISH PNEUMOPERITONEUM.: Brand: INSUFFLATION NEEDLE

## (undated) DEVICE — ANGIOGRAPHIC CATHETER: Brand: IMAGER™ II

## (undated) DEVICE — SPONGE 4 X 4 XRAY 16 PLY STRL LF RFD

## (undated) DEVICE — TISSUE RETRIEVAL SYSTEM: Brand: INZII RETRIEVAL SYSTEM

## (undated) DEVICE — 3000CC GUARDIAN II: Brand: GUARDIAN

## (undated) DEVICE — CATH URETERAL 5FR X 70 CM FLEX TIP POLYUR BARD

## (undated) DEVICE — PROGRASP FORCEPS: Brand: ENDOWRIST;DAVINCI SI

## (undated) DEVICE — BASIC SINGLE BASIN 2-LF: Brand: MEDLINE INDUSTRIES, INC.

## (undated) DEVICE — DRAIN JACKSON PRATT 10FR 1/8END: Brand: CARDINAL HEALTH

## (undated) DEVICE — NEEDLE 25G X 1 1/2

## (undated) DEVICE — SPONGE DRAIN 4 X 4

## (undated) DEVICE — PREMIUM DRY TRAY LF: Brand: MEDLINE INDUSTRIES, INC.

## (undated) DEVICE — PAD GROUNDING ADULT

## (undated) DEVICE — VISUALIZATION SYSTEM: Brand: CLEARIFY

## (undated) DEVICE — UROCATCH BAG

## (undated) DEVICE — 40595 XL TRENDELENBURG POSITIONING KIT: Brand: 40595 XL TRENDELENBURG POSITIONING KIT

## (undated) DEVICE — KIT, BETHLEHEM ROBOTIC PROST: Brand: CARDINAL HEALTH

## (undated) DEVICE — GLOVE SRG BIOGEL ECLIPSE 7.5

## (undated) DEVICE — INTENDED FOR TISSUE SEPARATION, AND OTHER PROCEDURES THAT REQUIRE A SHARP SURGICAL BLADE TO PUNCTURE OR CUT.: Brand: BARD-PARKER SAFETY BLADES SIZE 11, STERILE

## (undated) DEVICE — SUT ETHILON 3-0 FS-1 18 IN 663G

## (undated) DEVICE — SURGICEL 4 X 8

## (undated) DEVICE — CATH FOLEY COUNCIL 20FR 5ML 2 WAY LUBRICATH

## (undated) DEVICE — SHEATH URETERAL ACCESS 12/14FR 45CM PROXIS

## (undated) DEVICE — DRAPE SHEET THREE QUARTER

## (undated) DEVICE — TIP COVER ACCESSORY

## (undated) DEVICE — SUT VLOC 90 3-0 90 CV-23 L9 IN VLOCM1944

## (undated) DEVICE — SUT MONOCRYL 4-0 PS-2 27 IN Y426H

## (undated) DEVICE — SUT VLOC 90 3-0 CV-23 9 IN VLOCM0844

## (undated) DEVICE — ADHESIVE SKN CLSR HISTOACRYL FLEX 0.5ML LF